# Patient Record
Sex: MALE | Race: BLACK OR AFRICAN AMERICAN | Employment: FULL TIME | URBAN - METROPOLITAN AREA
[De-identification: names, ages, dates, MRNs, and addresses within clinical notes are randomized per-mention and may not be internally consistent; named-entity substitution may affect disease eponyms.]

---

## 2018-07-25 ENCOUNTER — APPOINTMENT (OUTPATIENT)
Dept: ULTRASOUND IMAGING | Facility: CLINIC | Age: 43
End: 2018-07-25
Attending: EMERGENCY MEDICINE
Payer: COMMERCIAL

## 2018-07-25 ENCOUNTER — HOSPITAL ENCOUNTER (EMERGENCY)
Facility: CLINIC | Age: 43
Discharge: HOME | End: 2018-07-25
Attending: EMERGENCY MEDICINE | Admitting: EMERGENCY MEDICINE
Payer: COMMERCIAL

## 2018-07-25 VITALS
TEMPERATURE: 98.1 F | SYSTOLIC BLOOD PRESSURE: 107 MMHG | RESPIRATION RATE: 16 BRPM | DIASTOLIC BLOOD PRESSURE: 88 MMHG | OXYGEN SATURATION: 100 % | HEART RATE: 83 BPM

## 2018-07-25 DIAGNOSIS — E87.6 HYPOKALEMIA: ICD-10-CM

## 2018-07-25 DIAGNOSIS — K85.90 ACUTE PANCREATITIS, UNSPECIFIED COMPLICATION STATUS, UNSPECIFIED PANCREATITIS TYPE: ICD-10-CM

## 2018-07-25 DIAGNOSIS — R11.2 NON-INTRACTABLE VOMITING WITH NAUSEA, UNSPECIFIED VOMITING TYPE: ICD-10-CM

## 2018-07-25 LAB
ALBUMIN SERPL-MCNC: 4.6 G/DL (ref 3.4–5)
ALP SERPL-CCNC: 89 U/L (ref 40–150)
ALT SERPL W P-5'-P-CCNC: 69 U/L (ref 0–70)
ANION GAP SERPL CALCULATED.3IONS-SCNC: 14 MMOL/L (ref 3–14)
ANION GAP SERPL CALCULATED.3IONS-SCNC: 20 MMOL/L (ref 3–14)
AST SERPL W P-5'-P-CCNC: 102 U/L (ref 0–45)
BASOPHILS # BLD AUTO: 0 10E9/L (ref 0–0.2)
BASOPHILS NFR BLD AUTO: 0.1 %
BILIRUB SERPL-MCNC: 2.5 MG/DL (ref 0.2–1.3)
BUN SERPL-MCNC: 11 MG/DL (ref 7–30)
BUN SERPL-MCNC: 13 MG/DL (ref 7–30)
CALCIUM SERPL-MCNC: 8.3 MG/DL (ref 8.5–10.1)
CALCIUM SERPL-MCNC: 9.3 MG/DL (ref 8.5–10.1)
CHLORIDE SERPL-SCNC: 92 MMOL/L (ref 94–109)
CHLORIDE SERPL-SCNC: 98 MMOL/L (ref 94–109)
CO2 SERPL-SCNC: 19 MMOL/L (ref 20–32)
CO2 SERPL-SCNC: 22 MMOL/L (ref 20–32)
CREAT SERPL-MCNC: 0.52 MG/DL (ref 0.66–1.25)
CREAT SERPL-MCNC: 0.7 MG/DL (ref 0.66–1.25)
DIFFERENTIAL METHOD BLD: ABNORMAL
EOSINOPHIL # BLD AUTO: 0 10E9/L (ref 0–0.7)
EOSINOPHIL NFR BLD AUTO: 0 %
ERYTHROCYTE [DISTWIDTH] IN BLOOD BY AUTOMATED COUNT: 13 % (ref 10–15)
GFR SERPL CREATININE-BSD FRML MDRD: >90 ML/MIN/1.7M2
GFR SERPL CREATININE-BSD FRML MDRD: >90 ML/MIN/1.7M2
GLUCOSE SERPL-MCNC: 118 MG/DL (ref 70–99)
GLUCOSE SERPL-MCNC: 143 MG/DL (ref 70–99)
HCT VFR BLD AUTO: 44.8 % (ref 40–53)
HEMOCCULT STL QL: NEGATIVE
HGB BLD-MCNC: 15.9 G/DL (ref 13.3–17.7)
IMM GRANULOCYTES # BLD: 0 10E9/L (ref 0–0.4)
IMM GRANULOCYTES NFR BLD: 0.6 %
INTERPRETATION ECG - MUSE: NORMAL
LACTATE BLD-SCNC: 1.5 MMOL/L (ref 0.7–2)
LIPASE SERPL-CCNC: 977 U/L (ref 73–393)
LYMPHOCYTES # BLD AUTO: 0.8 10E9/L (ref 0.8–5.3)
LYMPHOCYTES NFR BLD AUTO: 10.8 %
MAGNESIUM SERPL-MCNC: 1.9 MG/DL (ref 1.6–2.3)
MCH RBC QN AUTO: 36.3 PG (ref 26.5–33)
MCHC RBC AUTO-ENTMCNC: 35.5 G/DL (ref 31.5–36.5)
MCV RBC AUTO: 102 FL (ref 78–100)
MONOCYTES # BLD AUTO: 0.7 10E9/L (ref 0–1.3)
MONOCYTES NFR BLD AUTO: 9.6 %
NEUTROPHILS # BLD AUTO: 5.7 10E9/L (ref 1.6–8.3)
NEUTROPHILS NFR BLD AUTO: 78.9 %
NRBC # BLD AUTO: 0 10*3/UL
NRBC BLD AUTO-RTO: 0 /100
PLATELET # BLD AUTO: 218 10E9/L (ref 150–450)
POTASSIUM SERPL-SCNC: 2.9 MMOL/L (ref 3.4–5.3)
POTASSIUM SERPL-SCNC: 3.2 MMOL/L (ref 3.4–5.3)
PROT SERPL-MCNC: 9.6 G/DL (ref 6.8–8.8)
RBC # BLD AUTO: 4.38 10E12/L (ref 4.4–5.9)
SODIUM SERPL-SCNC: 131 MMOL/L (ref 133–144)
SODIUM SERPL-SCNC: 134 MMOL/L (ref 133–144)
WBC # BLD AUTO: 7.2 10E9/L (ref 4–11)

## 2018-07-25 PROCEDURE — 25000132 ZZH RX MED GY IP 250 OP 250 PS 637: Performed by: EMERGENCY MEDICINE

## 2018-07-25 PROCEDURE — 83605 ASSAY OF LACTIC ACID: CPT | Performed by: EMERGENCY MEDICINE

## 2018-07-25 PROCEDURE — 96365 THER/PROPH/DIAG IV INF INIT: CPT

## 2018-07-25 PROCEDURE — 82272 OCCULT BLD FECES 1-3 TESTS: CPT | Performed by: EMERGENCY MEDICINE

## 2018-07-25 PROCEDURE — 76705 ECHO EXAM OF ABDOMEN: CPT

## 2018-07-25 PROCEDURE — 25000125 ZZHC RX 250: Performed by: EMERGENCY MEDICINE

## 2018-07-25 PROCEDURE — 99285 EMERGENCY DEPT VISIT HI MDM: CPT | Mod: 25

## 2018-07-25 PROCEDURE — 80048 BASIC METABOLIC PNL TOTAL CA: CPT | Performed by: EMERGENCY MEDICINE

## 2018-07-25 PROCEDURE — 80053 COMPREHEN METABOLIC PANEL: CPT | Performed by: EMERGENCY MEDICINE

## 2018-07-25 PROCEDURE — 25000128 H RX IP 250 OP 636: Performed by: EMERGENCY MEDICINE

## 2018-07-25 PROCEDURE — 83735 ASSAY OF MAGNESIUM: CPT | Performed by: EMERGENCY MEDICINE

## 2018-07-25 PROCEDURE — 83690 ASSAY OF LIPASE: CPT | Performed by: EMERGENCY MEDICINE

## 2018-07-25 PROCEDURE — 96361 HYDRATE IV INFUSION ADD-ON: CPT

## 2018-07-25 PROCEDURE — 85025 COMPLETE CBC W/AUTO DIFF WBC: CPT | Performed by: EMERGENCY MEDICINE

## 2018-07-25 PROCEDURE — 36415 COLL VENOUS BLD VENIPUNCTURE: CPT | Performed by: EMERGENCY MEDICINE

## 2018-07-25 PROCEDURE — 93005 ELECTROCARDIOGRAM TRACING: CPT

## 2018-07-25 PROCEDURE — 96366 THER/PROPH/DIAG IV INF ADDON: CPT

## 2018-07-25 PROCEDURE — 96375 TX/PRO/DX INJ NEW DRUG ADDON: CPT

## 2018-07-25 RX ORDER — ONDANSETRON 4 MG/1
4 TABLET, ORALLY DISINTEGRATING ORAL EVERY 8 HOURS PRN
Qty: 10 TABLET | Refills: 0 | Status: SHIPPED | OUTPATIENT
Start: 2018-07-25 | End: 2019-07-21

## 2018-07-25 RX ORDER — MORPHINE SULFATE 4 MG/ML
4 INJECTION, SOLUTION INTRAMUSCULAR; INTRAVENOUS ONCE
Status: COMPLETED | OUTPATIENT
Start: 2018-07-25 | End: 2018-07-25

## 2018-07-25 RX ORDER — POTASSIUM CL/LIDO/0.9 % NACL 10MEQ/0.1L
10 INTRAVENOUS SOLUTION, PIGGYBACK (ML) INTRAVENOUS ONCE
Status: COMPLETED | OUTPATIENT
Start: 2018-07-25 | End: 2018-07-25

## 2018-07-25 RX ORDER — POTASSIUM CHLORIDE 1500 MG/1
40 TABLET, EXTENDED RELEASE ORAL ONCE
Status: COMPLETED | OUTPATIENT
Start: 2018-07-25 | End: 2018-07-25

## 2018-07-25 RX ORDER — ONDANSETRON 2 MG/ML
4 INJECTION INTRAMUSCULAR; INTRAVENOUS ONCE
Status: COMPLETED | OUTPATIENT
Start: 2018-07-25 | End: 2018-07-25

## 2018-07-25 RX ADMIN — POTASSIUM CHLORIDE 40 MEQ: 1500 TABLET, EXTENDED RELEASE ORAL at 15:36

## 2018-07-25 RX ADMIN — MORPHINE SULFATE 4 MG: 4 INJECTION INTRAVENOUS at 15:36

## 2018-07-25 RX ADMIN — LIDOCAINE HYDROCHLORIDE 10 MEQ: 10 INJECTION, SOLUTION EPIDURAL; INFILTRATION; INTRACAUDAL; PERINEURAL at 16:38

## 2018-07-25 RX ADMIN — ONDANSETRON 4 MG: 2 INJECTION INTRAMUSCULAR; INTRAVENOUS at 15:36

## 2018-07-25 RX ADMIN — SODIUM CHLORIDE 1000 ML: 9 INJECTION, SOLUTION INTRAVENOUS at 15:36

## 2018-07-25 ASSESSMENT — ENCOUNTER SYMPTOMS
DIZZINESS: 0
DYSURIA: 0
BLOOD IN STOOL: 0
FEVER: 0
ABDOMINAL PAIN: 1
NAUSEA: 1
DIARRHEA: 0
VOMITING: 1

## 2018-07-25 NOTE — ED AVS SNAPSHOT
[facility]    [address]    [facility] [address]    Phone:  [phone]    Fax:  [phone]                                       Chalo Freire   MRN: [id number]    Department:  [facility]   Date of Visit:  7/25/2018           After Visit Summary Signature Page     I have received my discharge instructions, and my questions have been answered. I have discussed any challenges I see with this plan with the nurse or doctor.    ..........................................................................................................................................  Patient/Patient Representative Signature      ..........................................................................................................................................  Patient Representative Print Name and Relationship to Patient    ..................................................               ................................................  Date                                            Time    ..........................................................................................................................................  Reviewed by Signature/Title    ...................................................              ..............................................  Date                                                            Time

## 2018-07-25 NOTE — LETTER
July 25, 2018      To Whom It May Concern:      Chalo Freire was seen in our Emergency Department today, 07/25/18.  I expect his condition to improve over the next 2 days.  He may return to work when improved.    Sincerely,        Ciarra Dawkins RN

## 2018-07-25 NOTE — ED PROVIDER NOTES
History     Chief Complaint:  Abdominal Pain, Nausea, and Vomiting    The history is provided by the patient.      Chalo Freire is a 43 year old male who presents with abdominal pain, nausea, and vomiting. Patient reports that he has been experiencing nausea and vomiting over the past 2 days, vomiting multiple times every day (he cannot quantify). Patient has had decreased oral intake secondary to these symptoms, and had a small bowel movement yesterday consisting of black stool. Today patient developed non-radiating epigastric and RUQ abdominal pain which he believes is secondary to the amount of vomiting he has had. Denies africa hemoptysis. He has attempted to take Tylenol for his symptoms but has not been able to keep this down and notes no alleviating factors. He reports history of alcohol abuse (last drink 8 months ago) and gastric ulcers for which he takes Carafate. Denies fever, cough, urinary symptoms, or any other symptoms.    Allergies:  Hydrocodone     Medications:    Valium  Lisinopril  Oxycodone  Carafate     Past Medical History:    Hypertension  Alcoholic fatty liver    Past Surgical History:    No pertinent past surgical history.    Family History:    No pertinent family history.    Social History:  Smoking status: Never smoker  Alcohol use: No (quit in December of 2017)   Marital Status:        Review of Systems   Constitutional: Negative for fever.   Gastrointestinal: Positive for abdominal pain, nausea and vomiting. Negative for blood in stool and diarrhea.   Genitourinary: Negative for dysuria.   Neurological: Negative for dizziness.   All other systems reviewed and are negative.    Physical Exam     Patient Vitals for the past 24 hrs:   BP Temp Temp src Pulse Heart Rate Resp SpO2   07/25/18 1820 (!) 132/102 - - - 82 - 98 %   07/25/18 1815 - - - - 84 - 97 %   07/25/18 1800 - - - - 73 - 97 %   07/25/18 1745 - - - - 74 - 99 %   07/25/18 1630 - - - - 70 - 99 %   07/25/18 1615 - - - - 69 -  99 %   07/25/18 1600 (!) 128/102 - - - 90 - -   07/25/18 1545 - - - - 96 - -   07/25/18 1530 - - - - - - 99 %   07/25/18 1515 - - - - - - 100 %   07/25/18 1510 (!) 140/102 98.1  F (36.7  C) Oral 83 83 16 99 %   07/25/18 1509 - - - - - - 99 %   07/25/18 1150 (!) 142/111 97.7  F (36.5  C) Temporal 112 - 20 99 %       Physical Exam  General: Well-developed and well-nourished. Well appearing middle aged  man. Cooperative.  Head:  Atraumatic.  Eyes:  Conjunctivae, lids, and sclerae are normal.  ENT:    Normal nose. Moist mucous membranes.  Neck:  Supple. Normal range of motion.  CV:  Regular rate and rhythm. Normal heart sounds with no murmurs, rubs, or gallops detected.  Resp:  No respiratory distress. Clear to auscultation bilaterally without decreased breath sounds, wheezing, rales, or rhonchi.  GI:  Soft. Non-distended. Mild-moderate tenderness in the epigastrium with moderate tenderness in the RUQ.     Rectal: No trung-anal masses or fluctuance. Return of scant light brown stool on BOYD.  MS:  Normal ROM.   Skin:  Warm. Non-diaphoretic. No pallor.  Neuro:  Awake. A&Ox3. Normal strength.  Psych: Normal mood and affect. Normal speech.  Vitals reviewed.    Emergency Department Course   EKG  Indication: abdominal pain  Time: 1538  Rate 82 bpm. CO interval 146. QRS duration 82. QT/QTc 380/443.   Normal sinus rhythm minimal voltage criteria for LVH, may be normal variant.  No acute ST changes.  T wave less prominent as compared to prior, dated 11/28/16.    Imaging:  US Abdomen Limited  IMPRESSION:  Gallbladder sludge but no evidence of gallstones or bile duct dilatation. Fatty infiltration of the liver.  Report per radiology.     Laboratory:  CBC:  WBC 7.2, HGB 15.9, ,  CMP:  (L), Potassium 2.9 (L), Chloride 92 (L), Carbon Dioxide 19 (L), ANION Gap 20 (H), Glucose 143 (H), Bilirubin 2.5 (H), Protein 9.6 (H),  (H), otherwise WNL (Creatinine 0.70)  Lipase: 977 (H)  (1254) Magnesium: 1.9  (1552)  Lactic Acid: 1.5    Occult blood stool: negative     Interventions:  (1536) Zofran, 4 mg, IV injection  (1536) Morphine, 4 mg, IV injection  (1536) Normal Saline, 1 liter, IV bolus  (1536) Potassium Chloride SA CR tablet, 20 mEq, PO  (1902) potassium chloride, 10mEq, IV infusion    Emergency Department Course:  Nursing notes and vitals reviewed.  (1510) I performed an exam of the patient as documented above.    Blood was drawn from the patient. This was sent for laboratory testing, findings above.   The patient was sent for a ultrasound while in the emergency department, findings above.   EKG was done, interpretation as above.    (1746) I revisited with the patient in his room to discuss results. Patient is overall feeling improved. Offered admission for observation, but patient stated he would prefer to be discharged. Will repeat BMP and PO challenge.    (1915) I revisited with the patient in his room to discuss results. Patient tolerated crackers at this time and feels comfortable with discharge.    Findings and plan explained to the patient. Patient discharged home with instructions regarding supportive care, medications, and reasons to return. The importance of close follow-up was reviewed. The patient was prescribed Zofran.      Impression & Plan    Medical Decision Making:  Chalo Freire is a 43 year old male who presents with 2 days of epigastric and right upper quadrant abdominal pain as well as nausea and vomiting.  He has a history of ulcers and has used Carafate and also has a history of alcohol abuse with fatty liver though he states he has not been drinking for the last 8 months.  Denies fever or hematochezia though he states his most recent bowel movement was black.  On exam, patient has mild to moderate tenderness in the epigastrium and right upper quadrant without other findings.  Digital rectal exam reveals light brown stool that is guaiac negative.  Patient was given Zofran, morphine, and IV  "fluids during his workup.  Lipase is elevated at 977 revealing a mild degree of pancreatitis.  However, right upper quadrant ultrasound reveals gallbladder sludge without gallstones or biliary duct dilation or other evidence of acute pathology with no evidence of cholecystitis without gallbladder wall thickening or pericholecystic fluid.  Fatty infiltration noted as expected given history of alcoholic fatty liver. Initial BMP reveals mild hyponatremia as well as hypokalemia to 2.9, hypochloremia to 92, and a bicarb of 19 resulting in anion gap of 20.  He has a very minimal elevation in AST to 102 though the remainder of his LFTs are overall reassuring.  It is noted that be total bilirubin is 2.5 though Care Everywhere indicates patient has chronically elevated total bilirubin (2.2 in December 2017) for which he has had MRCP and is followed by GI. Due to his hypokalemia to 2.9, I did obtain an EKG.  There is some T-wave flattening throughout without QT prolongation or other acute findings.  His magnesium is normal.  His potassium was repleted with 40 mEq by mouth as well as 10 mEq IV.  Fortunately his CBC reveals no leukocytosis or thrombocytopenia.  There is no anemia.  After the completion of his aforementioned workup, patient was reevaluated and states he is overall feeling better.  He states his abdominal pain is improved and he has not had vomiting since he has been in the emergency department.  I discussed the results of laboratory and imaging studies with him as well as a diagnosis of mild pancreatitis and offered observation admission.  Patient considered this though he states he preferred to go home if there is \"nothing serious.\"  I then it repeated patient's BMP which reveals normalization of his sodium, chloride, bicarb, and anion gap with a very minimal hypokalemia to 3.2.  This will likely continue to improve given his potassium was in part oral and he was able to tolerate PO in the emergency department " so he can replete further by dietary supplementation.  After he tolerated PO, I completed a final evaluation and he continues to state he is feeling well.  Exact etiology of his epigastric pain, nausea, and vomiting is unclear at this time though I favor it is related to his history of gastritis and alcohol abuse (though he reports he has been abstaining).  I recommended he follow-up with his gastroenterologist to discuss his visit today as well as if he requires endoscopy.  There is a mild degree of pancreatitis so I recommended he eat liquid diet for 1-2 days and then advance to soft, bland diet and eventually his usual diet depending on his symptoms.  He should also eat foods that are rich in potassium.  I provided prescription for Zofran as needed for nausea and vomiting and recommended he use Tylenol - and avoid NSAIDs - as needed for pain.  I provided strict return precautions and answered all the patient's questions.  He verbalizes understanding is amenable to discharge.    Diagnosis:    ICD-10-CM   1. Non-intractable vomiting with nausea, unspecified vomiting type R11.2   2. Acute pancreatitis, unspecified complication status, unspecified pancreatitis type K85.90   3. Hypokalemia E87.6       Disposition:  Patient was discharged home.      Discharge Medications:  New Prescriptions    ONDANSETRON (ZOFRAN ODT) 4 MG ODT TAB    Take 1 tablet (4 mg) by mouth every 8 hours as needed for nausea       I, Jayce Ghotra, am serving as a scribe on 7/25/2018 at 3:10 PM to personally document services performed by Dr. Lemon based on my observations and the provider's statements to me.         Jayce Ghotra  7/25/2018   [facility] EMERGENCY DEPARTMENT       Margie Lemon MD  07/26/18 5582

## 2018-07-25 NOTE — ED AVS SNAPSHOT
[facility]    [address] [facility] [address]    Phone:  [phone]    Fax:  [phone]                                       Chalo Freire   MRN: [id number]    Department:  [facility]   Date of Visit:  7/25/2018           Patient Information     Date Of Birth          1975        Your diagnoses for this visit were:     Non-intractable vomiting with nausea, unspecified vomiting type     Acute pancreatitis, unspecified complication status, unspecified pancreatitis type     Hypokalemia        You were seen by Margie Lemon MD.      Follow-up Information     Follow up with Marciano Salazar In 2 days.        Follow up with [facility].    Specialty:  EMERGENCY MEDICINE    Why:  If symptoms worsen    Contact information:    [address] [facility] [id number] [phone]        Follow up with Rochelle Ayoub NP In 3 days.    Why:  Gastroenterologist     Contact information:    [facility] [address] [place] [id number] [phone]          Discharge Instructions       Use Zofran as needed for nausea and vomiting.  Tylenol as needed for pain. Avoid NSAIDs like ibuprofen or naproxen.  No alcohol.  Eat liquid diet for 1-2 days and then advance to soft, bland diet and eventually your usual diet depending on symptoms. Eat foods rich in potassium.  Follow up with your primary care provider and your GI.  Return with uncontrolled vomiting, uncontrolled pain, fever >101F, or any other concerns.    Discharge References/Attachments     VOMITING (6Y-ADULT) (ENGLISH)    PANCREATITIS (ENGLISH)    HYPOKALEMIA, DISCHARGE INSTRUCTIONS (ENGLISH)      24 Hour Appointment Hotline       To make an appointment at any [facility], call [phone] ([phone]). If you don't have a family doctor or clinic, we will help you find one. [facility] are  conveniently located to serve the needs of you and your family.             Review of your medicines      CONTINUE these medicines which may have CHANGED, or have new prescriptions. If we are uncertain of the size of tablets/capsules you have at home, strength may be listed as something that might have changed.        Dose / Directions Last dose taken    ondansetron 4 MG ODT tab   Commonly known as:  ZOFRAN ODT   Dose:  4 mg   What changed:    - when to take this  - reasons to take this   Quantity:  10 tablet        Take 1 tablet (4 mg) by mouth every 8 hours as needed for nausea   Refills:  0          Our records show that you are taking the medicines listed below. If these are incorrect, please call your family doctor or clinic.        Dose / Directions Last dose taken    diazepam 5 MG tablet   Commonly known as:  VALIUM   Dose:  5-10 mg   Quantity:  20 tablet        Take 1-2 tablets (5-10 mg) by mouth every 8 hours as needed for anxiety or agitation (TREMOR)   Refills:  0        lisinopril 20 MG tablet   Commonly known as:  PRINIVIL/ZESTRIL   Dose:  20 mg   Quantity:  30 tablet        Take 1 tablet (20 mg) by mouth daily   Refills:  0        oxyCODONE IR 5 MG tablet   Commonly known as:  ROXICODONE   Dose:  5 mg   Quantity:  20 tablet        Take 1 tablet (5 mg) by mouth every 6 hours as needed for pain   Refills:  0                Prescriptions were sent or printed at these locations (1 Prescription)                   Other Prescriptions                Printed at Department/Unit printer (1 of 1)         ondansetron (ZOFRAN ODT) 4 MG ODT tab                Procedures and tests performed during your visit     Basic metabolic panel    CBC with platelets differential    Comprehensive metabolic panel    EKG 12-lead, tracing only    Lactic acid whole blood    Lipase    Magnesium    Occult blood stool    US Abdomen Limited      Orders Needing Specimen Collection     None      Pending Results     No orders found from  7/23/2018 to 7/26/2018.            Pending Culture Results     No orders found from 7/23/2018 to 7/26/2018.            Pending Results Instructions     If you had any lab results that were not finalized at the time of your Discharge, you can call the ED Lab Result RN at [phone]. You will be contacted by this team for any positive Lab results or changes in treatment. The nurses are available 7 days a week from 10A to 6:30P.  You can leave a message 24 hours per day and they will return your call.        Test Results From Your Hospital Stay        7/25/2018  1:07 PM      Component Results     Component Value Ref Range & Units Status    WBC 7.2 4.0 - 11.0 10e9/L Final    RBC Count 4.38 (L) 4.4 - 5.9 10e12/L Final    Hemoglobin 15.9 13.3 - 17.7 g/dL Final    Hematocrit 44.8 40.0 - 53.0 % Final     (H) 78 - 100 fl Final    MCH 36.3 (H) 26.5 - 33.0 pg Final    MCHC 35.5 31.5 - 36.5 g/dL Final    RDW 13.0 10.0 - 15.0 % Final    Platelet Count 218 150 - 450 10e9/L Final    Diff Method Automated Method  Final    % Neutrophils 78.9 % Final    % Lymphocytes 10.8 % Final    % Monocytes 9.6 % Final    % Eosinophils 0.0 % Final    % Basophils 0.1 % Final    % Immature Granulocytes 0.6 % Final    Nucleated RBCs 0 0 /100 Final    Absolute Neutrophil 5.7 1.6 - 8.3 10e9/L Final    Absolute Lymphocytes 0.8 0.8 - 5.3 10e9/L Final    Absolute Monocytes 0.7 0.0 - 1.3 10e9/L Final    Absolute Eosinophils 0.0 0.0 - 0.7 10e9/L Final    Absolute Basophils 0.0 0.0 - 0.2 10e9/L Final    Abs Immature Granulocytes 0.0 0 - 0.4 10e9/L Final    Absolute Nucleated RBC 0.0  Final         7/25/2018  1:25 PM      Component Results     Component Value Ref Range & Units Status    Sodium 131 (L) 133 - 144 mmol/L Final    Potassium 2.9 (L) 3.4 - 5.3 mmol/L Final    Chloride 92 (L) 94 - 109 mmol/L Final    Carbon Dioxide 19 (L) 20 - 32 mmol/L Final    Anion Gap 20 (H) 3 - 14 mmol/L Final    Glucose 143 (H) 70 - 99 mg/dL Final    Urea Nitrogen 13  7 - 30 mg/dL Final    Creatinine 0.70 0.66 - 1.25 mg/dL Final    GFR Estimate >90 >60 mL/min/1.7m2 Final    Non  GFR Calc    GFR Estimate If Black >90 >60 mL/min/1.7m2 Final    African American GFR Calc    Calcium 9.3 8.5 - 10.1 mg/dL Final    Bilirubin Total 2.5 (H) 0.2 - 1.3 mg/dL Final    Albumin 4.6 3.4 - 5.0 g/dL Final    Protein Total 9.6 (H) 6.8 - 8.8 g/dL Final    Alkaline Phosphatase 89 40 - 150 U/L Final    ALT 69 0 - 70 U/L Final     (H) 0 - 45 U/L Final         7/25/2018  1:25 PM      Component Results     Component Value Ref Range & Units Status    Lipase 977 (H) 73 - 393 U/L Final         7/25/2018  5:37 PM      Narrative     ULTRASOUND ABDOMEN LIMITED 7/25/2018 5:16 PM     HISTORY:  Right upper quadrant pain.      COMPARISON:  CT abdomen and pelvis 5/23/2016.    FINDINGS:  Liver is increased in echogenicity without focal lesions.  The gallbladder contains sludge but no shadowing gallstones. There is  no gallbladder wall thickening or pericholecystic fluid. Common bile  duct is normal in diameter. Pancreas is normal where visualized.  Examination of the right kidney is unremarkable.        Impression     IMPRESSION:  Gallbladder sludge but no evidence of gallstones or bile  duct dilatation. Fatty infiltration of the liver.    GUERLINE ARMSTRONG MD         7/25/2018  5:04 PM      Component Results     Component Value Ref Range & Units Status    Occult Blood Negative NEG^Negative Final         7/25/2018  4:06 PM      Component Results     Component Value Ref Range & Units Status    Lactic Acid 1.5 0.7 - 2.0 mmol/L Final         7/25/2018  3:36 PM      Component Results     Component Value Ref Range & Units Status    Magnesium 1.9 1.6 - 2.3 mg/dL Final         7/25/2018  6:43 PM      Component Results     Component Value Ref Range & Units Status    Sodium 134 133 - 144 mmol/L Final    Potassium 3.2 (L) 3.4 - 5.3 mmol/L Final    Chloride 98 94 - 109 mmol/L Final    Carbon Dioxide 22 20 - 32  mmol/L Final    Anion Gap 14 3 - 14 mmol/L Final    Glucose 118 (H) 70 - 99 mg/dL Final    Urea Nitrogen 11 7 - 30 mg/dL Final    Creatinine 0.52 (L) 0.66 - 1.25 mg/dL Final    GFR Estimate >90 >60 mL/min/1.7m2 Final    Non  GFR Calc    GFR Estimate If Black >90 >60 mL/min/1.7m2 Final    African American GFR Calc    Calcium 8.3 (L) 8.5 - 10.1 mg/dL Final                Clinical Quality Measure: Blood Pressure Screening     Your blood pressure was checked while you were in the emergency department today. The last reading we obtained was  BP: (!) 132/102 (Simultaneous filing. User may not have seen previous data.) . Please read the guidelines below about what these numbers mean and what you should do about them.  If your systolic blood pressure (the top number) is less than 120 and your diastolic blood pressure (the bottom number) is less than 80, then your blood pressure is normal. There is nothing more that you need to do about it.  If your systolic blood pressure (the top number) is 120-139 or your diastolic blood pressure (the bottom number) is 80-89, your blood pressure may be higher than it should be. You should have your blood pressure rechecked within a year by a primary care provider.  If your systolic blood pressure (the top number) is 140 or greater or your diastolic blood pressure (the bottom number) is 90 or greater, you may have high blood pressure. High blood pressure is treatable, but if left untreated over time it can put you at risk for heart attack, stroke, or kidney failure. You should have your blood pressure rechecked by a primary care provider within the next 4 weeks.  If your provider in the emergency department today gave you specific instructions to follow-up with your doctor or provider even sooner than that, you should follow that instruction and not wait for up to 4 weeks for your follow-up visit.        Thank you for choosing [facility]       Thank you for choosing [facility]  "for your care. Our goal is always to provide you with excellent care. Hearing back from our patients is one way we can continue to improve our services. Please take a few minutes to complete the written survey that you may receive in the mail after you visit with us. Thank you!        [facility] Information     [facility] lets you send messages to your doctor, view your test results, renew your prescriptions, schedule appointments and more. To sign up, go to [email or web address] . Click on \"Log in\" on the left side of the screen, which will take you to the Welcome page. Then click on \"Sign up Now\" on the right side of the page.     You will be asked to enter the access code listed below, as well as some personal information. Please follow the directions to create your username and password.     Your access code is: [email or web address]  Expires: 10/23/2018  7:23 PM     Your access code will  in 90 days. If you need help or a new code, please call your [facility] clinic or [phone].        Care EveryWhere ID     This is your Care EveryWhere ID. This could be used by other organizations to access your [facility] medical records  [id number]        Equal Access to Services     MULU DONOVAN AH: Sada Cooper, sergio mancilla, kemi cade, sakina hitchcock. So [facility] [phone].    ATENCIÓN: Si habla español, tiene a taylor disposición servicios gratuitos de asistencia lingüística. Llame al [phone].    We comply with applicable federal civil rights laws and [place] laws. We do not discriminate on the basis of race, color, national origin, age, disability, sex, sexual orientation, or gender identity.            After Visit Summary       This is your record. Keep this with you and show to your community pharmacist(s) and doctor(s) at your next visit.                  "

## 2018-07-26 NOTE — DISCHARGE INSTRUCTIONS
Use Zofran as needed for nausea and vomiting.  Tylenol as needed for pain. Avoid NSAIDs like ibuprofen or naproxen.  No alcohol.  Eat liquid diet for 1-2 days and then advance to soft, bland diet and eventually your usual diet depending on symptoms. Eat foods rich in potassium.  Follow up with your primary care provider and your GI.  Return with uncontrolled vomiting, uncontrolled pain, fever >101F, or any other concerns.

## 2019-07-21 ENCOUNTER — APPOINTMENT (OUTPATIENT)
Dept: CT IMAGING | Facility: CLINIC | Age: 44
DRG: 433 | End: 2019-07-21
Attending: EMERGENCY MEDICINE
Payer: COMMERCIAL

## 2019-07-21 ENCOUNTER — HOSPITAL ENCOUNTER (INPATIENT)
Facility: CLINIC | Age: 44
LOS: 5 days | Discharge: HOME | DRG: 433 | End: 2019-07-26
Attending: EMERGENCY MEDICINE | Admitting: INTERNAL MEDICINE
Payer: COMMERCIAL

## 2019-07-21 DIAGNOSIS — K21.00 GASTROESOPHAGEAL REFLUX DISEASE WITH ESOPHAGITIS: ICD-10-CM

## 2019-07-21 DIAGNOSIS — F10.939 ALCOHOL WITHDRAWAL SYNDROME WITH COMPLICATION (H): ICD-10-CM

## 2019-07-21 DIAGNOSIS — E83.42 HYPOMAGNESEMIA: ICD-10-CM

## 2019-07-21 DIAGNOSIS — R11.11 VOMITING WITHOUT NAUSEA, INTRACTABILITY OF VOMITING NOT SPECIFIED, UNSPECIFIED VOMITING TYPE: ICD-10-CM

## 2019-07-21 DIAGNOSIS — F10.930 ALCOHOL WITHDRAWAL SYNDROME WITHOUT COMPLICATION (H): ICD-10-CM

## 2019-07-21 DIAGNOSIS — I10 BENIGN ESSENTIAL HYPERTENSION: Primary | ICD-10-CM

## 2019-07-21 DIAGNOSIS — F32.0 CURRENT MILD EPISODE OF MAJOR DEPRESSIVE DISORDER, UNSPECIFIED WHETHER RECURRENT (H): ICD-10-CM

## 2019-07-21 LAB
ALBUMIN SERPL-MCNC: 4 G/DL (ref 3.4–5)
ALBUMIN UR-MCNC: 30 MG/DL
ALP SERPL-CCNC: 129 U/L (ref 40–150)
ALT SERPL W P-5'-P-CCNC: 63 U/L (ref 0–70)
ANION GAP SERPL CALCULATED.3IONS-SCNC: 20 MMOL/L (ref 3–14)
APPEARANCE UR: CLEAR
AST SERPL W P-5'-P-CCNC: 183 U/L (ref 0–45)
BASOPHILS # BLD AUTO: 0 10E9/L (ref 0–0.2)
BASOPHILS NFR BLD AUTO: 0.3 %
BILIRUB DIRECT SERPL-MCNC: 0.7 MG/DL (ref 0–0.2)
BILIRUB SERPL-MCNC: 2.5 MG/DL (ref 0.2–1.3)
BILIRUB UR QL STRIP: NEGATIVE
BUN SERPL-MCNC: 8 MG/DL (ref 7–30)
CALCIUM SERPL-MCNC: 8.8 MG/DL (ref 8.5–10.1)
CHLORIDE SERPL-SCNC: 97 MMOL/L (ref 94–109)
CO2 SERPL-SCNC: 21 MMOL/L (ref 20–32)
COLOR UR AUTO: ABNORMAL
CREAT SERPL-MCNC: 0.75 MG/DL (ref 0.66–1.25)
D DIMER PPP FEU-MCNC: 2.7 UG/ML FEU (ref 0–0.5)
DIFFERENTIAL METHOD BLD: ABNORMAL
EOSINOPHIL # BLD AUTO: 0 10E9/L (ref 0–0.7)
EOSINOPHIL NFR BLD AUTO: 0 %
ERYTHROCYTE [DISTWIDTH] IN BLOOD BY AUTOMATED COUNT: 13 % (ref 10–15)
ETHANOL SERPL-MCNC: <0.01 G/DL
GFR SERPL CREATININE-BSD FRML MDRD: >90 ML/MIN/{1.73_M2}
GLUCOSE BLDC GLUCOMTR-MCNC: 124 MG/DL (ref 70–99)
GLUCOSE SERPL-MCNC: 218 MG/DL (ref 70–99)
GLUCOSE UR STRIP-MCNC: 200 MG/DL
HCT VFR BLD AUTO: 36.7 % (ref 40–53)
HGB BLD-MCNC: 13 G/DL (ref 13.3–17.7)
HGB UR QL STRIP: NEGATIVE
HYALINE CASTS #/AREA URNS LPF: 1 /LPF (ref 0–2)
IMM GRANULOCYTES # BLD: 0 10E9/L (ref 0–0.4)
IMM GRANULOCYTES NFR BLD: 0.3 %
KETONES UR STRIP-MCNC: 60 MG/DL
LACTATE BLD-SCNC: 3.1 MMOL/L (ref 0.7–2)
LACTATE SERPL-SCNC: 3.7 MMOL/L (ref 0.4–2)
LEUKOCYTE ESTERASE UR QL STRIP: NEGATIVE
LIPASE SERPL-CCNC: 255 U/L (ref 73–393)
LYMPHOCYTES # BLD AUTO: 0.6 10E9/L (ref 0.8–5.3)
LYMPHOCYTES NFR BLD AUTO: 5.7 %
MAGNESIUM SERPL-MCNC: 1 MG/DL (ref 1.6–2.3)
MAGNESIUM SERPL-MCNC: 1.3 MG/DL (ref 1.6–2.3)
MCH RBC QN AUTO: 35.7 PG (ref 26.5–33)
MCHC RBC AUTO-ENTMCNC: 35.4 G/DL (ref 31.5–36.5)
MCV RBC AUTO: 101 FL (ref 78–100)
MONOCYTES # BLD AUTO: 0.6 10E9/L (ref 0–1.3)
MONOCYTES NFR BLD AUTO: 6.4 %
MUCOUS THREADS #/AREA URNS LPF: PRESENT /LPF
NEUTROPHILS # BLD AUTO: 8.6 10E9/L (ref 1.6–8.3)
NEUTROPHILS NFR BLD AUTO: 87.3 %
NITRATE UR QL: NEGATIVE
NRBC # BLD AUTO: 0 10*3/UL
NRBC BLD AUTO-RTO: 0 /100
PH UR STRIP: 7 PH (ref 5–7)
PHOSPHATE SERPL-MCNC: 2.1 MG/DL (ref 2.5–4.5)
PLATELET # BLD AUTO: 260 10E9/L (ref 150–450)
POTASSIUM SERPL-SCNC: 3.3 MMOL/L (ref 3.4–5.3)
PROT SERPL-MCNC: 9 G/DL (ref 6.8–8.8)
RBC # BLD AUTO: 3.64 10E12/L (ref 4.4–5.9)
RBC #/AREA URNS AUTO: 1 /HPF (ref 0–2)
SODIUM SERPL-SCNC: 138 MMOL/L (ref 133–144)
SOURCE: ABNORMAL
SP GR UR STRIP: 1 (ref 1–1.03)
TROPONIN I SERPL-MCNC: 0.03 UG/L (ref 0–0.04)
TROPONIN I SERPL-MCNC: <0.015 UG/L (ref 0–0.04)
UROBILINOGEN UR STRIP-MCNC: NORMAL MG/DL (ref 0–2)
WBC # BLD AUTO: 9.9 10E9/L (ref 4–11)
WBC #/AREA URNS AUTO: <1 /HPF (ref 0–5)

## 2019-07-21 PROCEDURE — 81001 URINALYSIS AUTO W/SCOPE: CPT | Performed by: EMERGENCY MEDICINE

## 2019-07-21 PROCEDURE — 84100 ASSAY OF PHOSPHORUS: CPT | Performed by: EMERGENCY MEDICINE

## 2019-07-21 PROCEDURE — 99223 1ST HOSP IP/OBS HIGH 75: CPT | Mod: AI | Performed by: INTERNAL MEDICINE

## 2019-07-21 PROCEDURE — 20000003 ZZH R&B ICU

## 2019-07-21 PROCEDURE — 25000128 H RX IP 250 OP 636: Performed by: EMERGENCY MEDICINE

## 2019-07-21 PROCEDURE — HZ2ZZZZ DETOXIFICATION SERVICES FOR SUBSTANCE ABUSE TREATMENT: ICD-10-PCS | Performed by: INTERNAL MEDICINE

## 2019-07-21 PROCEDURE — 25000128 H RX IP 250 OP 636

## 2019-07-21 PROCEDURE — 25000128 H RX IP 250 OP 636: Performed by: INTERNAL MEDICINE

## 2019-07-21 PROCEDURE — 87641 MR-STAPH DNA AMP PROBE: CPT | Performed by: INTERNAL MEDICINE

## 2019-07-21 PROCEDURE — 83735 ASSAY OF MAGNESIUM: CPT | Performed by: INTERNAL MEDICINE

## 2019-07-21 PROCEDURE — 83690 ASSAY OF LIPASE: CPT | Performed by: EMERGENCY MEDICINE

## 2019-07-21 PROCEDURE — 25000125 ZZHC RX 250: Performed by: INTERNAL MEDICINE

## 2019-07-21 PROCEDURE — 85025 COMPLETE CBC W/AUTO DIFF WBC: CPT | Performed by: EMERGENCY MEDICINE

## 2019-07-21 PROCEDURE — 84484 ASSAY OF TROPONIN QUANT: CPT | Performed by: EMERGENCY MEDICINE

## 2019-07-21 PROCEDURE — 96361 HYDRATE IV INFUSION ADD-ON: CPT

## 2019-07-21 PROCEDURE — 80076 HEPATIC FUNCTION PANEL: CPT | Performed by: EMERGENCY MEDICINE

## 2019-07-21 PROCEDURE — 83735 ASSAY OF MAGNESIUM: CPT | Performed by: EMERGENCY MEDICINE

## 2019-07-21 PROCEDURE — 96365 THER/PROPH/DIAG IV INF INIT: CPT | Mod: 59

## 2019-07-21 PROCEDURE — 93005 ELECTROCARDIOGRAM TRACING: CPT

## 2019-07-21 PROCEDURE — 71260 CT THORAX DX C+: CPT

## 2019-07-21 PROCEDURE — 83605 ASSAY OF LACTIC ACID: CPT | Performed by: INTERNAL MEDICINE

## 2019-07-21 PROCEDURE — 36415 COLL VENOUS BLD VENIPUNCTURE: CPT | Performed by: EMERGENCY MEDICINE

## 2019-07-21 PROCEDURE — 83605 ASSAY OF LACTIC ACID: CPT | Performed by: EMERGENCY MEDICINE

## 2019-07-21 PROCEDURE — 99285 EMERGENCY DEPT VISIT HI MDM: CPT | Mod: 25

## 2019-07-21 PROCEDURE — 84484 ASSAY OF TROPONIN QUANT: CPT | Performed by: INTERNAL MEDICINE

## 2019-07-21 PROCEDURE — 96375 TX/PRO/DX INJ NEW DRUG ADDON: CPT

## 2019-07-21 PROCEDURE — 36415 COLL VENOUS BLD VENIPUNCTURE: CPT | Performed by: INTERNAL MEDICINE

## 2019-07-21 PROCEDURE — 80320 DRUG SCREEN QUANTALCOHOLS: CPT | Performed by: EMERGENCY MEDICINE

## 2019-07-21 PROCEDURE — 87640 STAPH A DNA AMP PROBE: CPT | Performed by: INTERNAL MEDICINE

## 2019-07-21 PROCEDURE — 25800030 ZZH RX IP 258 OP 636: Performed by: EMERGENCY MEDICINE

## 2019-07-21 PROCEDURE — 80048 BASIC METABOLIC PNL TOTAL CA: CPT | Performed by: EMERGENCY MEDICINE

## 2019-07-21 PROCEDURE — 87040 BLOOD CULTURE FOR BACTERIA: CPT | Performed by: EMERGENCY MEDICINE

## 2019-07-21 PROCEDURE — 85379 FIBRIN DEGRADATION QUANT: CPT | Performed by: EMERGENCY MEDICINE

## 2019-07-21 PROCEDURE — 25000125 ZZHC RX 250: Performed by: EMERGENCY MEDICINE

## 2019-07-21 PROCEDURE — 00000146 ZZHCL STATISTIC GLUCOSE BY METER IP

## 2019-07-21 RX ORDER — ACETAMINOPHEN 650 MG/1
650 SUPPOSITORY RECTAL EVERY 4 HOURS PRN
Status: DISCONTINUED | OUTPATIENT
Start: 2019-07-21 | End: 2019-07-26 | Stop reason: HOSPADM

## 2019-07-21 RX ORDER — DIAZEPAM 10 MG/2ML
2.5 INJECTION, SOLUTION INTRAMUSCULAR; INTRAVENOUS ONCE
Status: COMPLETED | OUTPATIENT
Start: 2019-07-21 | End: 2019-07-21

## 2019-07-21 RX ORDER — PROCHLORPERAZINE 25 MG
25 SUPPOSITORY, RECTAL RECTAL EVERY 12 HOURS PRN
Status: DISCONTINUED | OUTPATIENT
Start: 2019-07-21 | End: 2019-07-26 | Stop reason: HOSPADM

## 2019-07-21 RX ORDER — POTASSIUM CHLORIDE 29.8 MG/ML
20 INJECTION INTRAVENOUS
Status: DISCONTINUED | OUTPATIENT
Start: 2019-07-21 | End: 2019-07-21

## 2019-07-21 RX ORDER — NALOXONE HYDROCHLORIDE 0.4 MG/ML
.1-.4 INJECTION, SOLUTION INTRAMUSCULAR; INTRAVENOUS; SUBCUTANEOUS
Status: DISCONTINUED | OUTPATIENT
Start: 2019-07-21 | End: 2019-07-26 | Stop reason: HOSPADM

## 2019-07-21 RX ORDER — HYDROMORPHONE HYDROCHLORIDE 1 MG/ML
0.2 INJECTION, SOLUTION INTRAMUSCULAR; INTRAVENOUS; SUBCUTANEOUS
Status: DISCONTINUED | OUTPATIENT
Start: 2019-07-21 | End: 2019-07-26 | Stop reason: HOSPADM

## 2019-07-21 RX ORDER — PROCHLORPERAZINE MALEATE 5 MG
10 TABLET ORAL EVERY 6 HOURS PRN
Status: DISCONTINUED | OUTPATIENT
Start: 2019-07-21 | End: 2019-07-26 | Stop reason: HOSPADM

## 2019-07-21 RX ORDER — LORAZEPAM 2 MG/ML
1 INJECTION INTRAMUSCULAR EVERY 6 HOURS
Status: DISCONTINUED | OUTPATIENT
Start: 2019-07-21 | End: 2019-07-25

## 2019-07-21 RX ORDER — POTASSIUM CHLORIDE 1.5 G/1.58G
20-40 POWDER, FOR SOLUTION ORAL
Status: DISCONTINUED | OUTPATIENT
Start: 2019-07-21 | End: 2019-07-26 | Stop reason: HOSPADM

## 2019-07-21 RX ORDER — ASPIRIN 81 MG/1
324 TABLET, CHEWABLE ORAL ONCE
Status: DISCONTINUED | OUTPATIENT
Start: 2019-07-21 | End: 2019-07-21

## 2019-07-21 RX ORDER — NITROGLYCERIN 0.4 MG/1
0.4 TABLET SUBLINGUAL EVERY 5 MIN PRN
Status: DISCONTINUED | OUTPATIENT
Start: 2019-07-21 | End: 2019-07-21

## 2019-07-21 RX ORDER — DIPHENHYDRAMINE HYDROCHLORIDE 50 MG/ML
25 INJECTION INTRAMUSCULAR; INTRAVENOUS ONCE
Status: COMPLETED | OUTPATIENT
Start: 2019-07-21 | End: 2019-07-21

## 2019-07-21 RX ORDER — MAGNESIUM SULFATE HEPTAHYDRATE 40 MG/ML
4 INJECTION, SOLUTION INTRAVENOUS EVERY 4 HOURS PRN
Status: DISCONTINUED | OUTPATIENT
Start: 2019-07-21 | End: 2019-07-26 | Stop reason: HOSPADM

## 2019-07-21 RX ORDER — LORAZEPAM 2 MG/ML
1-4 INJECTION INTRAMUSCULAR
Status: DISCONTINUED | OUTPATIENT
Start: 2019-07-21 | End: 2019-07-26 | Stop reason: HOSPADM

## 2019-07-21 RX ORDER — POTASSIUM CL/LIDO/0.9 % NACL 10MEQ/0.1L
10 INTRAVENOUS SOLUTION, PIGGYBACK (ML) INTRAVENOUS
Status: DISCONTINUED | OUTPATIENT
Start: 2019-07-21 | End: 2019-07-26 | Stop reason: HOSPADM

## 2019-07-21 RX ORDER — POTASSIUM CHLORIDE 1500 MG/1
20-40 TABLET, EXTENDED RELEASE ORAL
Status: DISCONTINUED | OUTPATIENT
Start: 2019-07-21 | End: 2019-07-26 | Stop reason: HOSPADM

## 2019-07-21 RX ORDER — POTASSIUM CHLORIDE 1.5 G/1.58G
20 POWDER, FOR SOLUTION ORAL 2 TIMES DAILY
COMMUNITY
End: 2020-03-23

## 2019-07-21 RX ORDER — IOPAMIDOL 755 MG/ML
500 INJECTION, SOLUTION INTRAVASCULAR ONCE
Status: COMPLETED | OUTPATIENT
Start: 2019-07-21 | End: 2019-07-21

## 2019-07-21 RX ORDER — SODIUM CHLORIDE 9 MG/ML
1000 INJECTION, SOLUTION INTRAVENOUS CONTINUOUS
Status: DISCONTINUED | OUTPATIENT
Start: 2019-07-21 | End: 2019-07-21

## 2019-07-21 RX ORDER — HYDRALAZINE HYDROCHLORIDE 20 MG/ML
10 INJECTION INTRAMUSCULAR; INTRAVENOUS EVERY 4 HOURS PRN
Status: DISCONTINUED | OUTPATIENT
Start: 2019-07-21 | End: 2019-07-26 | Stop reason: HOSPADM

## 2019-07-21 RX ORDER — SODIUM CHLORIDE 9 MG/ML
INJECTION, SOLUTION INTRAVENOUS CONTINUOUS
Status: DISCONTINUED | OUTPATIENT
Start: 2019-07-21 | End: 2019-07-24

## 2019-07-21 RX ORDER — POTASSIUM CHLORIDE 7.45 MG/ML
10 INJECTION INTRAVENOUS
Status: DISCONTINUED | OUTPATIENT
Start: 2019-07-21 | End: 2019-07-26 | Stop reason: HOSPADM

## 2019-07-21 RX ORDER — ONDANSETRON 2 MG/ML
INJECTION INTRAMUSCULAR; INTRAVENOUS
Status: COMPLETED
Start: 2019-07-21 | End: 2019-07-21

## 2019-07-21 RX ORDER — METOCLOPRAMIDE HYDROCHLORIDE 5 MG/ML
10 INJECTION INTRAMUSCULAR; INTRAVENOUS ONCE
Status: COMPLETED | OUTPATIENT
Start: 2019-07-21 | End: 2019-07-21

## 2019-07-21 RX ORDER — METOPROLOL SUCCINATE 25 MG/1
25 TABLET, EXTENDED RELEASE ORAL DAILY
Status: ON HOLD | COMMUNITY
End: 2019-07-26

## 2019-07-21 RX ORDER — METOPROLOL TARTRATE 1 MG/ML
5 INJECTION, SOLUTION INTRAVENOUS EVERY 6 HOURS
Status: DISCONTINUED | OUTPATIENT
Start: 2019-07-21 | End: 2019-07-22

## 2019-07-21 RX ADMIN — SODIUM CHLORIDE 1000 ML: 9 INJECTION, SOLUTION INTRAVENOUS at 16:58

## 2019-07-21 RX ADMIN — LORAZEPAM 1 MG: 2 INJECTION INTRAMUSCULAR; INTRAVENOUS at 23:15

## 2019-07-21 RX ADMIN — IOPAMIDOL 66 ML: 755 INJECTION, SOLUTION INTRAVENOUS at 17:59

## 2019-07-21 RX ADMIN — Medication 10 MEQ: at 22:59

## 2019-07-21 RX ADMIN — LORAZEPAM 2 MG: 2 INJECTION INTRAMUSCULAR; INTRAVENOUS at 20:32

## 2019-07-21 RX ADMIN — ONDANSETRON HYDROCHLORIDE 4 MG: 2 INJECTION, SOLUTION INTRAMUSCULAR; INTRAVENOUS at 16:03

## 2019-07-21 RX ADMIN — DIPHENHYDRAMINE HYDROCHLORIDE 25 MG: 50 INJECTION, SOLUTION INTRAMUSCULAR; INTRAVENOUS at 18:40

## 2019-07-21 RX ADMIN — METOCLOPRAMIDE 10 MG: 5 INJECTION, SOLUTION INTRAMUSCULAR; INTRAVENOUS at 18:41

## 2019-07-21 RX ADMIN — FOLIC ACID: 5 INJECTION, SOLUTION INTRAMUSCULAR; INTRAVENOUS; SUBCUTANEOUS at 18:42

## 2019-07-21 RX ADMIN — METOPROLOL TARTRATE 5 MG: 1 INJECTION, SOLUTION INTRAVENOUS at 20:32

## 2019-07-21 RX ADMIN — DEXMEDETOMIDINE 0.2 MCG/KG/HR: 100 INJECTION, SOLUTION, CONCENTRATE INTRAVENOUS at 19:14

## 2019-07-21 RX ADMIN — SODIUM CHLORIDE 87 ML: 9 INJECTION, SOLUTION INTRAVENOUS at 17:59

## 2019-07-21 RX ADMIN — Medication 2.5 MG: at 17:23

## 2019-07-21 RX ADMIN — MAGNESIUM SULFATE HEPTAHYDRATE 4 G: 40 INJECTION, SOLUTION INTRAVENOUS at 20:48

## 2019-07-21 RX ADMIN — SODIUM CHLORIDE 1000 ML: 9 INJECTION, SOLUTION INTRAVENOUS at 16:00

## 2019-07-21 ASSESSMENT — ENCOUNTER SYMPTOMS
PALPITATIONS: 1
FREQUENCY: 1
FEVER: 0
VOMITING: 1
ABDOMINAL PAIN: 1
COUGH: 1
NAUSEA: 1

## 2019-07-21 ASSESSMENT — MIFFLIN-ST. JEOR: SCORE: 1433.75

## 2019-07-21 NOTE — ED TRIAGE NOTES
Pt comes in with nausea, vomiting, dizziness, decreased appetite, head feels off, pounding heart and chills since Friday. No one is ill around pt. Pt dizzy with any activity and blurry vision and red eyes.

## 2019-07-21 NOTE — PHARMACY-ADMISSION MEDICATION HISTORY
Admission medication history interview status for this patient is complete. See [facility] admission navigator for allergy information, prior to admission medications and immunization status.     Medication history interview source(s):Patient & Wife  Medication history resources (including written lists, pill bottles, clinic record): Prescription Bottles  Primary pharmacy:[facility]    Changes made to PTA medication list:  Added: None  Deleted: Diazepam, Zofran, Oxycodone  Changed: None    Actions taken by pharmacist (provider contacted, etc):None     Additional medication history information:None    Medication reconciliation/reorder completed by provider prior to medication history? No    Prior to Admission medications    Medication Sig Last Dose Taking? Auth Provider   lisinopril (PRINIVIL,ZESTRIL) 20 MG tablet Take 1 tablet (20 mg) by mouth daily 7/20/2019 at PM Yes Mckinley Golden MD   metoprolol succinate ER (TOPROL-XL) 25 MG 24 hr tablet Take 25 mg by mouth daily 7/20/2019 at PM Yes Reported, Patient   potassium chloride (KLOR-CON) 20 MEQ packet Take 20 mEq by mouth 2 times daily 7/20/2019 at PM Yes Reported, Patient

## 2019-07-21 NOTE — LETTER
[place] [address] [facility]  [address]  [facility] [address]  Phone: [phone]  Fax: [phone]    July 26, 2019        Chalo Lynchjosiehoward  [address]  [place] [address]          To whom it may concern:    RE: Chalo Franciscomary jobrandon    Patient was seen and treated  at [facility] from 7/21/19 to 7/26/19.    Please contact me for questions or concerns.      Sincerely,        Annalise Mancia MD

## 2019-07-21 NOTE — ED PROVIDER NOTES
"  History     Chief Complaint:  Chest Pain and Nausea & Vomiting    HPI   Chalo Freire is a 44 year old male who presents to the emergency department with chest pain, shakiness, and vomiting. Patient presents with his wife and states the chest discomfort began two days ago and has persisted constantly.  Location is the middle of hist chest, and he describes it as a \"burning.\"  The patient reports palpitations, nausea, vomiting, abdominal pain, a cough with thick phlegm, and decreased urinary frequency. He states he took Mucinex to mitigate his symptoms. He denies a fever, a history of kidney problems, blood in his vomit, or being in close proximity of someone sick recently. Additionally, the patient has been on Metoprolol ER succinate, 25 mg, and Potassium CL, 20 mEq, since 7/8/2019.  Most recent discharge summary as per below.  Wife adds that the patient is shaky and had his last drink yesterday.  Several episodes of non bloody vomiting.  Patient drinks daily and has a history of alcohol abuse.  No seizures.    Physician Discharge Summary      Name: Chalo Freire    MRN: [id number]     YOB: 1975    Age: 41 year old                                                   Primary care provider: Marciano Salazar        Admit date:  5/23/2016        Discharge date and time: 5/27/2016              Discharge Physician:  Mckinley Golden        Discharge Diagnosis         #1.  Alcohol withdrawal syndrome  #2.  Alcohol abuse and dependence  #3.  Hypertension  #4.  Indeterminate liver lesion and fatty liver         Allergies:  Hydrocodone     Medications:    Lisinopril  Metoprolol succinate ER  Valium  Zofran  Oxycodone    Past Medical History:    Low potassium  hypertension   Alcohol withdrawal  Fatty liver    Past Surgical History:    The patient does not have any pertinent past surgical history.    Family History:   No past pertinent family history.    Social History:  Negative for tobacco use.  Positive for " alcohol use.  Marital Status:   [2]     Review of Systems   Constitutional: Negative for fever.   Respiratory: Positive for cough.    Cardiovascular: Positive for chest pain and palpitations.   Gastrointestinal: Positive for abdominal pain, nausea and vomiting.   Genitourinary: Positive for frequency (decreased).   All other systems reviewed and are negative.  Pt comes in with nausea, vomiting, dizziness, decreased appetite, head feels off, pounding heart and chills since Friday. No one is ill around pt. Pt dizzy with any activity and blurry vision and red eyes.    Physical Exam     Patient Vitals for the past 24 hrs:   BP Temp Temp src Pulse Heart Rate Resp SpO2 Weight   07/21/19 1845 (!) 130/103 -- -- 99 88 22 -- --   07/21/19 1824 -- -- -- -- -- -- -- 61.4 kg (135 lb 5.8 oz)   07/21/19 1815 (!) 135/98 -- -- 102 -- -- 99 % --   07/21/19 1745 106/90 -- -- 93 89 21 99 % --   07/21/19 1730 102/85 -- -- 101 97 18 97 % --   07/21/19 1715 (!) 120/94 -- -- 98 87 19 99 % --   07/21/19 1700 (!) 140/103 -- -- 102 105 19 98 % --   07/21/19 1645 (!) 136/104 -- -- 103 98 28 100 % --   07/21/19 1630 (!) 144/102 -- -- 99 111 (!) 39 97 % --   07/21/19 1615 (!) 142/104 -- -- 96 94 25 100 % --   07/21/19 1600 (!) 141/102 -- -- 111 111 (!) 33 100 % --   07/21/19 1550 (!) 146/110 98.6  F (37  C) Oral 119 119 30 99 % --   07/21/19 1545 (!) 146/110 -- -- -- -- -- -- --         Physical Exam  GEN: patient appears tired, wife at the bedside.  Tremulous at times.  HEAD: atraumatic, normocephalic  EYES: pupils reactive (3plus to 2plus), extraocular muscles intact, conjunctivae injected bilaterally  ENT: TMs flat and white bilaterally, oropharynx normal with no erythema or exudate, mucus membranes dry  NECK: no cervical LAD, no meningeal signs  RESPIRATORY: no tachypnea, breath sounds clear to auscultation (no rales, wheezes, rhonchi), chest wall nontender, normal phonation  CVS: normal S1/S2, II/VI systolic ej murmurs/rubs/gallops,  tachycardia  ABDOMEN: soft, nontender, no masses or organomegaly, no rebound, decreased bowel sounds  BACK: no costovertebral angle tenderness  EXTREMITIES: intact pulses x 4, full range of motion at joints, no edema  MUSCULOSKELETAL: no deformities  SKIN: warm and dry, no acute rashes   NEURO: GCS 15, cranial nerves intact.  Motor- moves all 4 extremities with 4/5 strength  Sensation- intact. Coordination- too weak to ambulate.  Overall symmetrical exam.  Patient is shaky.  HEME: no bruising or petechiae/contusions  LYMPH: no lymphadenopathy      Emergency Department Course   ECG:  Indication: Chest pain, nausea, and vomiting  Time: 1547  Vent. Rate 130 bpm. DE interval 142. QRS duration 78. QT/QTc 290/426. P-R-T axis 46 49 18.    Sinus tachycardia  Otherwise normal ECG  No significant change compared to EKG dated 7/25/2018.   Read time: 1548    Imaging:  Radiographic findings were communicated with the patient who voiced understanding of the findings.    CT chest pulmonary embolism w/ IV contrast:   Hepatomegaly with diffuse fatty infiltration of liver. No  evidence of pulmonary embolism. Thoracic aorta is unremarkable, as per radiology.    Laboratory:  Blood culture: In process x2  Lactic acid: 3.7 (H)  Hepatic panel: Bilirubin direct 0.7 (H), Bilirubin total 2.5 (H), protein total 9.0 (H),  (H)    D Dimer quantitative: 2.7 (H)    Magnesium: 1.0 (L)  Alcohol Ethyl: <0.01  Phosphorus: 2.1 (L)  CBC: WBC: 9.9, HGB: 13.0 (H), PLT: 260  (1558) Troponin: <0.015  BMP: Glucose 218 (H), Potassium 3.3. (L), Anion gap 20 (H), o/w WNL (Creatinine: 0.75)  UA with Microscopic: Urine  (A), Urineketon 60 (A) , Protein albumin urine 30 (A), Mucous urine, o/w WNL    Interventions:  1550 Aspirin 324 mg PO  1600 NS 1L IV   1603 Zofran 4 mg injection  1658 NS 1L IV   1723 Valium 2.5 mg IV  1840 Benadryl 25 mg IV  1841 Reglan 10 mg IV  1914 precedex 4 mcg/mL in  mL IV gtt (0.2mg/kg/hr)  1918 NS 1L with Ifuvite  "adult 10 mL, thiamine 100 mg, folic acid 1 mg IV (banana bag)  Heplock  Cardiac/Sp02 monitoring  Oxygen by nasal cannula at 2L/min    Emergency Department Course:  Nursing notes and vitals reviewed. (1611) I performed an exam of the patient as documented above.      Medicine administered as documented above.    IV inserted. Blood drawn. This was sent to the lab for further testing, results above.     The patient was sent for a chest while in the emergency department, findings above.     EKG obtained in the ED, see results above.      (1716) I rechecked the patient and discussed the results of his workup thus far.     (1810) Spoke with wife, recheck patient     (1808) I consulted with Dr. Betancourt of the hospitalist services. They are in agreement to accept the patient for admission.    Findings and plan explained to the Patient who consents to admission. Discussed the patient with Dr. Betancourt, who will admit the patient to an ICU bed for further monitoring, evaluation, and treatment.       I personally reviewed the laboratory and imaging results with the Patient and answered all related questions prior toadmission.        /84   Pulse 79   Temp 99.9  F (37.7  C) (Oral)   Resp 20   Ht 1.676 m (5' 6\")   Wt 60.1 kg (132 lb 7.9 oz)   SpO2 99%   BMI 21.39 kg/m    Discussed results with wiife    Impression & Plan    Medical Decision Making:  Chalo Freire is a 44 year old male who admits that his last drink was yesterday, and he has been feeling increasingly shaky. The patient had to be admitted for alcohol withdrawal seizures (in reviewing his history). When he first presented he was hypertensive and shaking and the ER nurse suspected he was going into withdrawal. An IV was established, and he was given IV fluids in addition to a banana bag with magnesium, thiamine, and foate. The patient was complaining of nausea. He was given Zofran in addition to valium and later another dose of Reglan and benadryl, he " was started on precedex drip for agitation. He was able to urinate here which does show ketones.     His lactic acid is elevated, but I do not suspect that he is septic.  Likely alcoholic ketoacidosis with dehydration. His white blood cell count was normal. No other source of infection. Glucose is a little high at 218. He does have a slight anion gap, but his CO2 is not decreased. Urine did not show much glucose urea. His potassium was a little bit low and  that is in order. Troponin did not show any abnormalities. He was complaining of chest pain and palpitations. His EKG did show sinus tachycardia with inverted T-waves laterally. However troponin did not show any ischemia.  D dimer was elevated, and we could not rule out pulmonary embolism (tachycardia with history of chest pain).      Phosphorus was low, and he was given magnesium repletion lipase. His lipase showed no pancreatitis. CT scan of the chest shows hepatomegaly with diffuse fatty infiltration but no pulmonary embolism. Case was discussed with the hospitalist, and the plan is to be admitted for alcohol withdrawal.    Patient improved with precedex gtt and was less agitated.      Diagnosis:    ICD-10-CM    1. Alcohol withdrawal syndrome without complication (H) F10.230 UA with Microscopic     Alcohol ethyl     Phosphorus     Phosphorus     Lactic acid whole blood     CANCELED: Phosphorus   2. Vomiting without nausea, intractability of vomiting not specified, unspecified vomiting type R11.11    3. Dehydration  4. Lactic acidosis secondary to #3    Disposition:  Admitted to hospital/ICU    Scribe Disclosure:  I, Mahdi Liriano, am serving as a scribe on 7/21/2019 at 4:11 PM to personally document services performed by Lou Pickett MD based on my observations and the provider's statements to me.     7/21/2019   [facility] EMERGENCY DEPARTMENT       Lou Pickett MD  07/21/19 1856

## 2019-07-22 LAB
ALBUMIN SERPL-MCNC: 2.7 G/DL (ref 3.4–5)
ALP SERPL-CCNC: 93 U/L (ref 40–150)
ALT SERPL W P-5'-P-CCNC: 47 U/L (ref 0–70)
ANION GAP SERPL CALCULATED.3IONS-SCNC: 5 MMOL/L (ref 3–14)
AST SERPL W P-5'-P-CCNC: 144 U/L (ref 0–45)
BILIRUB SERPL-MCNC: 1.7 MG/DL (ref 0.2–1.3)
BUN SERPL-MCNC: 5 MG/DL (ref 7–30)
CALCIUM SERPL-MCNC: 7.3 MG/DL (ref 8.5–10.1)
CHLORIDE SERPL-SCNC: 112 MMOL/L (ref 94–109)
CO2 SERPL-SCNC: 26 MMOL/L (ref 20–32)
CREAT SERPL-MCNC: 0.52 MG/DL (ref 0.66–1.25)
ERYTHROCYTE [DISTWIDTH] IN BLOOD BY AUTOMATED COUNT: 13.2 % (ref 10–15)
GFR SERPL CREATININE-BSD FRML MDRD: >90 ML/MIN/{1.73_M2}
GLUCOSE BLDC GLUCOMTR-MCNC: 106 MG/DL (ref 70–99)
GLUCOSE BLDC GLUCOMTR-MCNC: 118 MG/DL (ref 70–99)
GLUCOSE BLDC GLUCOMTR-MCNC: 132 MG/DL (ref 70–99)
GLUCOSE BLDC GLUCOMTR-MCNC: 82 MG/DL (ref 70–99)
GLUCOSE BLDC GLUCOMTR-MCNC: 91 MG/DL (ref 70–99)
GLUCOSE BLDC GLUCOMTR-MCNC: 91 MG/DL (ref 70–99)
GLUCOSE SERPL-MCNC: 93 MG/DL (ref 70–99)
HCT VFR BLD AUTO: 30.7 % (ref 40–53)
HGB BLD-MCNC: 10.3 G/DL (ref 13.3–17.7)
INTERPRETATION ECG - MUSE: NORMAL
LACTATE BLD-SCNC: 0.7 MMOL/L (ref 0.7–2)
MAGNESIUM SERPL-MCNC: 2.6 MG/DL (ref 1.6–2.3)
MCH RBC QN AUTO: 35.6 PG (ref 26.5–33)
MCHC RBC AUTO-ENTMCNC: 33.6 G/DL (ref 31.5–36.5)
MCV RBC AUTO: 106 FL (ref 78–100)
MRSA DNA SPEC QL NAA+PROBE: NEGATIVE
PLATELET # BLD AUTO: 157 10E9/L (ref 150–450)
POTASSIUM SERPL-SCNC: 3.8 MMOL/L (ref 3.4–5.3)
PROT SERPL-MCNC: 6.4 G/DL (ref 6.8–8.8)
RBC # BLD AUTO: 2.89 10E12/L (ref 4.4–5.9)
SODIUM SERPL-SCNC: 143 MMOL/L (ref 133–144)
SPECIMEN SOURCE: NORMAL
TROPONIN I SERPL-MCNC: 0.04 UG/L (ref 0–0.04)
WBC # BLD AUTO: 4.5 10E9/L (ref 4–11)

## 2019-07-22 PROCEDURE — 25800030 ZZH RX IP 258 OP 636: Performed by: INTERNAL MEDICINE

## 2019-07-22 PROCEDURE — 25000132 ZZH RX MED GY IP 250 OP 250 PS 637: Performed by: INTERNAL MEDICINE

## 2019-07-22 PROCEDURE — C9113 INJ PANTOPRAZOLE SODIUM, VIA: HCPCS | Performed by: INTERNAL MEDICINE

## 2019-07-22 PROCEDURE — 12000000 ZZH R&B MED SURG/OB

## 2019-07-22 PROCEDURE — 80053 COMPREHEN METABOLIC PANEL: CPT | Performed by: INTERNAL MEDICINE

## 2019-07-22 PROCEDURE — 99232 SBSQ HOSP IP/OBS MODERATE 35: CPT | Performed by: INTERNAL MEDICINE

## 2019-07-22 PROCEDURE — 83735 ASSAY OF MAGNESIUM: CPT | Performed by: INTERNAL MEDICINE

## 2019-07-22 PROCEDURE — 25000125 ZZHC RX 250: Performed by: INTERNAL MEDICINE

## 2019-07-22 PROCEDURE — 85027 COMPLETE CBC AUTOMATED: CPT | Performed by: INTERNAL MEDICINE

## 2019-07-22 PROCEDURE — 36415 COLL VENOUS BLD VENIPUNCTURE: CPT | Performed by: INTERNAL MEDICINE

## 2019-07-22 PROCEDURE — 25000128 H RX IP 250 OP 636: Performed by: INTERNAL MEDICINE

## 2019-07-22 PROCEDURE — 00000146 ZZHCL STATISTIC GLUCOSE BY METER IP

## 2019-07-22 RX ORDER — METOPROLOL SUCCINATE 25 MG/1
25 TABLET, EXTENDED RELEASE ORAL EVERY EVENING
Status: DISCONTINUED | OUTPATIENT
Start: 2019-07-22 | End: 2019-07-26

## 2019-07-22 RX ORDER — PANTOPRAZOLE SODIUM 40 MG/1
40 TABLET, DELAYED RELEASE ORAL
Status: DISCONTINUED | OUTPATIENT
Start: 2019-07-23 | End: 2019-07-26 | Stop reason: HOSPADM

## 2019-07-22 RX ORDER — LISINOPRIL 20 MG/1
20 TABLET ORAL DAILY
Status: DISCONTINUED | OUTPATIENT
Start: 2019-07-22 | End: 2019-07-26 | Stop reason: HOSPADM

## 2019-07-22 RX ORDER — LISINOPRIL 20 MG/1
20 TABLET ORAL EVERY EVENING
Status: DISCONTINUED | OUTPATIENT
Start: 2019-07-22 | End: 2019-07-22

## 2019-07-22 RX ORDER — POTASSIUM CHLORIDE 1.5 G/1.58G
20 POWDER, FOR SOLUTION ORAL 2 TIMES DAILY
Status: DISCONTINUED | OUTPATIENT
Start: 2019-07-22 | End: 2019-07-26 | Stop reason: HOSPADM

## 2019-07-22 RX ADMIN — FOLIC ACID: 5 INJECTION, SOLUTION INTRAMUSCULAR; INTRAVENOUS; SUBCUTANEOUS at 20:53

## 2019-07-22 RX ADMIN — Medication 10 MEQ: at 00:06

## 2019-07-22 RX ADMIN — LORAZEPAM 1 MG: 2 INJECTION INTRAMUSCULAR; INTRAVENOUS at 02:14

## 2019-07-22 RX ADMIN — PANTOPRAZOLE SODIUM 40 MG: 40 INJECTION, POWDER, FOR SOLUTION INTRAVENOUS at 08:22

## 2019-07-22 RX ADMIN — DEXMEDETOMIDINE 0.2 MCG/KG/HR: 100 INJECTION, SOLUTION, CONCENTRATE INTRAVENOUS at 12:09

## 2019-07-22 RX ADMIN — LORAZEPAM 1 MG: 2 INJECTION INTRAMUSCULAR; INTRAVENOUS at 15:07

## 2019-07-22 RX ADMIN — SODIUM CHLORIDE: 9 INJECTION, SOLUTION INTRAVENOUS at 03:00

## 2019-07-22 RX ADMIN — LORAZEPAM 1 MG: 2 INJECTION INTRAMUSCULAR; INTRAVENOUS at 20:38

## 2019-07-22 RX ADMIN — LORAZEPAM 1 MG: 2 INJECTION INTRAMUSCULAR; INTRAVENOUS at 08:23

## 2019-07-22 RX ADMIN — METOPROLOL TARTRATE 5 MG: 1 INJECTION, SOLUTION INTRAVENOUS at 15:13

## 2019-07-22 RX ADMIN — METOPROLOL TARTRATE 5 MG: 1 INJECTION, SOLUTION INTRAVENOUS at 08:22

## 2019-07-22 RX ADMIN — Medication 10 MEQ: at 01:09

## 2019-07-22 RX ADMIN — METOPROLOL TARTRATE 5 MG: 1 INJECTION, SOLUTION INTRAVENOUS at 02:14

## 2019-07-22 RX ADMIN — POTASSIUM CHLORIDE 20 MEQ: 1.5 POWDER, FOR SOLUTION ORAL at 20:36

## 2019-07-22 RX ADMIN — Medication 10 MEQ: at 02:14

## 2019-07-22 RX ADMIN — METOPROLOL SUCCINATE 25 MG: 25 TABLET, EXTENDED RELEASE ORAL at 20:37

## 2019-07-22 RX ADMIN — LISINOPRIL 20 MG: 20 TABLET ORAL at 17:27

## 2019-07-22 ASSESSMENT — ACTIVITIES OF DAILY LIVING (ADL)
ADLS_ACUITY_SCORE: 13

## 2019-07-22 NOTE — PLAN OF CARE
ICU End of Shift Summary.  For vital signs and complete assessments, please see documentation flowsheets.     Pertinent assessments: Pt alert, oriented, pleasant and cooperative.  VSS.  Low grade temp initially.  C/o abd pain-improved as night progressed.  CIWA scores 11-1, precedex infusing, PRN ativan administered.  Nausea improved during shift. Urine low output but dark leticia in color. Lactic acid improved.  Potassium and magnesium levels are low so were replaced.  Major Shift Events: admission  Plan (Upcoming Events): continue to manage withdrawal symptoms  Discharge/Transfer Needs: TBD    Bedside Shift Report Completed : y  Bedside Safety Check Completed:y

## 2019-07-22 NOTE — H&P
[facility]    History and Physical - Hospitalist Service       Date of Admission:  7/21/2019    Assessment & Plan   Chalo Freire is a 44 year old male admitted on 7/21/2019. He has a past medical history significant for hypertension, hypokalemia, and alcohol dependence with abuse.  He presented to emergency room with nausea and vomiting.  He is in acute alcohol withdrawals.    1.  Acute alcohol withdrawals.  Start Ativan withdrawal protocol.  He was started on Precedex infusion in the emergency room.  Continue Precedex at this time.  Also start scheduled Ativan 1 mg every 6 hours.  Start IV vitamins.    2.  Nausea and vomiting.  Suspect that this is related to his acute alcohol withdrawals.  Also check a lipase level.  Start continuous IV fluids.  N.p.o. for now.  Antiemetics as needed.  Check abdominal x-ray.    3.  Hypertension.  Restart metoprolol and IV form while n.p.o.  5 mg every 6 hours.  Have IV hydralazine available if needed.    4.  Hypokalemia.  Start potassium replacement protocol.    5.  Lactic acidosis.  Suspect that this is due to dehydration and frequent episodes of vomiting.  Start continuous IV fluids.  Recheck lactic acid later tonight.    6.  Epigastric pain.  Check lipase level.  Check serial troponins.  Monitor on telemetry.  Suspect that this is likely related to GI issues.  Hold off on aspirin unless abnormalities noted on serial troponins.    7.  Hyperglycemia.  Denies history of diabetes.  Start NovoLog sliding scale.     Diet: NPO for Medical/Clinical Reasons Except for: Meds    DVT Prophylaxis: Pneumatic Compression Devices  Palacios Catheter: not present  Code Status: Full code        Nico Betancourt, DO  [facility]    ______________________________________________________________________    Chief Complaint   Nausea and vomiting.    History is obtained from the patient    History of Present Illness   Chalo Freire is a 44 year old male who has a past  medical history significant for hypertension, hypokalemia, and alcohol dependence with abuse.  He states he has been having nausea for the past few days.  Has had multiple episodes of vomiting each day for the past 3 days.  Is also been having pain located in his epigastric area that started 3 days ago.  His pain is been constant since that time.  Is located just below his sternum.  Does state that this feels like a pressure sensation.  Pain medications have not helped with this pain.  Nothing seems to make the pain any worse.  He has not noted any fevers or chills.  Has had an occasional cough.  Does feel somewhat short of breath at times because of pain.  Has felt like his heart has been racing at times.  He does note that he stopped drinking alcohol 2 days ago.  He is very evasive and will not tell me exactly how much alcohol he normally drinks.  He does feel weak.  He also feels tremulous.  Has not been able to keep anything down from food or fluid standpoint for the past few days because of frequent vomiting.  No other complaints.    Review of Systems    The 10 point Review of Systems is negative other than noted in the HPI     Past Medical History    I have reviewed this patient's medical history and updated it with pertinent information if needed.   Past Medical History:   Diagnosis Date     Falls      History of low potassium      Hypertension        Past Surgical History   I have reviewed this patient's surgical history and updated it with pertinent information if needed.  No past surgical history on file.    Social History   I have reviewed this patient's social history and updated it with pertinent information if needed.  Social History     Tobacco Use     Smoking status: Never Smoker     Smokeless tobacco: Never Used   Substance Use Topics     Alcohol use: Yes     Drug use: No       Family History   I have reviewed this patient's family history and updated it with pertinent information if needed.        Prior to Admission Medications   Prior to Admission Medications   Prescriptions Last Dose Informant Patient Reported? Taking?   lisinopril (PRINIVIL,ZESTRIL) 20 MG tablet 7/20/2019 at PM  No Yes   Sig: Take 1 tablet (20 mg) by mouth daily   metoprolol succinate ER (TOPROL-XL) 25 MG 24 hr tablet 7/20/2019 at PM  Yes Yes   Sig: Take 25 mg by mouth daily   potassium chloride (KLOR-CON) 20 MEQ packet 7/20/2019 at PM  Yes Yes   Sig: Take 20 mEq by mouth 2 times daily      Facility-Administered Medications: None     Allergies   Allergies   Allergen Reactions     Hydrocodone        Physical Exam   Vital Signs: Temp: 98.6  F (37  C) Temp src: Oral BP: (!) 130/103 Pulse: 99 Heart Rate: 88 Resp: 22 SpO2: 99 %      Weight: 135 lbs 5.8 oz    Gen:  NAD, A&Ox3.  Eyes:  PERRL, sclera anicteric.  OP:  MMM, no lesions.  Neck:  Supple.  CV:  Regular, mild tachycardia, no murmurs.  Lung:  CTA b/l, normal effort.  Ab:  +BS, soft.  Skin:  Warm, dry to touch.  No rash.  Ext:  No pitting edema LE b/l.      Data   Data reviewed today: I reviewed all medications, new labs and imaging results over the last 24 hours. I personally reviewed the EKG tracing showing Sinus tachycardia, no obvious acute ischemia.    Recent Labs   Lab 07/21/19  1558   WBC 9.9   HGB 13.0*   *         POTASSIUM 3.3*   CHLORIDE 97   CO2 21   BUN 8   CR 0.75   ANIONGAP 20*   BELIA 8.8   *   ALBUMIN 4.0   PROTTOTAL 9.0*   BILITOTAL 2.5*   ALKPHOS 129   ALT 63   *   TROPI <0.015

## 2019-07-22 NOTE — PLAN OF CARE
ICU End of Shift Summary.  For vital signs and complete assessments, please see documentation flowsheets.     Pertinent assessments: AOX4, CIWA minimal scores on scheduled Ativan, hypertensive, PTA meds re-started, LS clear on RA, CV SB/SR, GI/ no n/v/d, 1 BM, tolerating reg. Diet, voids ok.  Major Shift Events: Precedex titrated off  Plan (Upcoming Events): Continue CIWA, chem dep outpt, IVF   Discharge/Transfer Needs: transfer to floor, discharge home tomorrow    Bedside Shift Report Completed : Y  Bedside Safety Check Completed: Y

## 2019-07-22 NOTE — PROGRESS NOTES
"[facility]  Hospitalist Progress Note  Juni Griffith MD  07/22/2019    Assessment & Plan   Chalo Freire is a 44 year old male admitted on 7/21/2019. He has a past medical history significant for hypertension, hypokalemia, and alcohol dependence with abuse.  He presented to emergency room with nausea and vomiting.  He is in acute alcohol withdrawals.     1.  Acute alcohol withdrawals.   -- off precedex  -- scoring very mildly on protocol, continue with Ativan withdrawal protocol.   -- continue Ativan 1 mg every 6 hours.   -- continue IV vitamins.  -- resume diet  -- family already has him going to inpatient CD treatment after discharge at \"[facility]\"     2.  Alcohol related hepatitis with nausea and vomiting.     --  lipase level is 255.   -- continue IV fluids.    -- tolerating diet  -- Antiemetics as needed.    -- CT shows  fatty liver, no PE or pneumonia     3.  Hypertension.    -- resume metoprolol, lisinopril.     4.  Hypokalemia.   -- replaced     5.  Lactic acidosis.  Suspect that this is due to dehydration and frequent episodes of vomiting.  -- Recheck of lactic acid back to normal     6.  Epigastric pain.   -- normal lipase level.    --Check serial troponins all below reference range.  --no rhythm problems  7.  Hyperglycemia.  Denies history of diabetes.  Start NovoLog sliding scale.     Diet: tolerating diet  DVT Prophylaxis: Pneumatic Compression Devices  Palacios Catheter: not present  Code Status: Full code    Disposition  -- anticipate home tomorrow  -- plan for inpatient treatment at \"[facility]\"          Interval History   -- chart reviewed  -- off precedex tt  -- tolerating diet  -- family at bedside    -Data reviewed today: I reviewed all new labs and imaging over the last 24 hours. I personally reviewed no images or EKG's today.    Physical Exam   Heart Rate: 51, Blood pressure (!) 122/92, pulse 52, temperature 98.4  F (36.9  C), temperature source Oral, resp. rate 16, " "height 1.676 m (5' 6\"), weight 60.1 kg (132 lb 7.9 oz), SpO2 100 %.  Vitals:    07/21/19 1824 07/21/19 2007   Weight: 61.4 kg (135 lb 5.8 oz) 60.1 kg (132 lb 7.9 oz)     Vital Signs with Ranges  Temp:  [98  F (36.7  C)-99.9  F (37.7  C)] 98.4  F (36.9  C)  Pulse:  [] 52  Heart Rate:  [] 51  Resp:  [9-39] 16  BP: (102-146)/() 122/92  SpO2:  [97 %-100 %] 100 %  I/O's Last 24 hours  I/O last 3 completed shifts:  In: 2715.57 [I.V.:2715.57]  Out: 150 [Urine:150]    Constitutional: Awake, alert, cooperative, no apparent distress  Respiratory: Clear to auscultation bilaterally, no crackles or wheezing  Cardiovascular: Regular rate and rhythm, normal S1 and S2, and no murmur noted  GI: Normal bowel sounds, soft, non-distended, non-tender  Skin/Integumen: No rashes, no cyanosis, no edema  Other:      Medications   All medications were reviewed.    dexmedetomidine Stopped (07/22/19 1434)     sodium chloride 125 mL/hr at 07/22/19 1400       LORazepam  1 mg Intravenous Q6H     metoprolol  5 mg Intravenous Q6H     pantoprazole (PROTONIX) IV  40 mg Intravenous Daily with breakfast     IV infusion builder WITH LARGE additive list   Intravenous Q24H        Data   Recent Labs   Lab 07/22/19  0535 07/21/19  2357 07/21/19  2043 07/21/19  1558   WBC 4.5  --   --  9.9   HGB 10.3*  --   --  13.0*   *  --   --  101*     --   --  260     --   --  138   POTASSIUM 3.8  --   --  3.3*   CHLORIDE 112*  --   --  97   CO2 26  --   --  21   BUN 5*  --   --  8   CR 0.52*  --   --  0.75   ANIONGAP 5  --   --  20*   BELIA 7.3*  --   --  8.8   GLC 93  --   --  218*   ALBUMIN 2.7*  --   --  4.0   PROTTOTAL 6.4*  --   --  9.0*   BILITOTAL 1.7*  --   --  2.5*   ALKPHOS 93  --   --  129   ALT 47  --   --  63   *  --   --  183*   LIPASE  --   --   --  255   TROPI  --  0.037 0.033 <0.015       Recent Results (from the past 24 hour(s))   CT Chest Pulmonary Embolism w Contrast    Narrative    CT CHEST PULMONARY " EMBOLISM WITH CONTRAST 7/21/2019 6:10 PM     HISTORY: Chest pain.     TECHNIQUE: Thin section axial images are performed from the thoracic  inlet to the lung bases utilizing 66 mL of Isovue 370 IV contrast  without adverse event. Coronal reformatted images are also generated.  Radiation dose for this scan was reduced using automated exposure  control, adjustment of the mA and/or kV according to patient size, or  iterative reconstruction technique.    FINDINGS:   Chest: The lungs are clear. No infiltrate or consolidation. No pleural  or pericardial fluid. Heart is normal in size. Esophagus is  unremarkable. Thyroid gland appears normal in size where imaged. No  enlarged lymph nodes in the chest or axillas. No evidence of pulmonary  embolism. Thoracic aorta is unremarkable. Limited images upper abdomen  demonstrate hepatomegaly with diffuse fatty infiltration of liver. The  spleen is normal in size. Bone window examination is within normal  limits.      Impression    IMPRESSION: Hepatomegaly with diffuse fatty infiltration of liver. No  evidence of pulmonary embolism. Thoracic aorta is unremarkable.    MD Juni MORAN MD  Text Page  (7am to 6pm)

## 2019-07-23 LAB
ALBUMIN SERPL-MCNC: 2.8 G/DL (ref 3.4–5)
ALBUMIN UR-MCNC: NEGATIVE MG/DL
ALP SERPL-CCNC: 103 U/L (ref 40–150)
ALT SERPL W P-5'-P-CCNC: 63 U/L (ref 0–70)
ANION GAP SERPL CALCULATED.3IONS-SCNC: 6 MMOL/L (ref 3–14)
APPEARANCE UR: CLEAR
AST SERPL W P-5'-P-CCNC: 174 U/L (ref 0–45)
BILIRUB SERPL-MCNC: 2.4 MG/DL (ref 0.2–1.3)
BILIRUB UR QL STRIP: NEGATIVE
BUN SERPL-MCNC: 2 MG/DL (ref 7–30)
CALCIUM SERPL-MCNC: 7.7 MG/DL (ref 8.5–10.1)
CHLORIDE SERPL-SCNC: 107 MMOL/L (ref 94–109)
CO2 SERPL-SCNC: 25 MMOL/L (ref 20–32)
COLOR UR AUTO: NORMAL
CREAT SERPL-MCNC: 0.5 MG/DL (ref 0.66–1.25)
ERYTHROCYTE [DISTWIDTH] IN BLOOD BY AUTOMATED COUNT: 12.9 % (ref 10–15)
FOLATE SERPL-MCNC: 8.9 NG/ML
GFR SERPL CREATININE-BSD FRML MDRD: >90 ML/MIN/{1.73_M2}
GLUCOSE SERPL-MCNC: 87 MG/DL (ref 70–99)
GLUCOSE UR STRIP-MCNC: NEGATIVE MG/DL
HCT VFR BLD AUTO: 31.7 % (ref 40–53)
HGB BLD-MCNC: 10.8 G/DL (ref 13.3–17.7)
HGB UR QL STRIP: NEGATIVE
KETONES UR STRIP-MCNC: NEGATIVE MG/DL
LEUKOCYTE ESTERASE UR QL STRIP: NEGATIVE
MCH RBC QN AUTO: 35.3 PG (ref 26.5–33)
MCHC RBC AUTO-ENTMCNC: 34.1 G/DL (ref 31.5–36.5)
MCV RBC AUTO: 104 FL (ref 78–100)
NITRATE UR QL: NEGATIVE
PH UR STRIP: 7 PH (ref 5–7)
PLATELET # BLD AUTO: 156 10E9/L (ref 150–450)
POTASSIUM SERPL-SCNC: 3.6 MMOL/L (ref 3.4–5.3)
PROT SERPL-MCNC: 6.7 G/DL (ref 6.8–8.8)
RBC # BLD AUTO: 3.06 10E12/L (ref 4.4–5.9)
RBC #/AREA URNS AUTO: <1 /HPF (ref 0–2)
SODIUM SERPL-SCNC: 138 MMOL/L (ref 133–144)
SOURCE: NORMAL
SP GR UR STRIP: 1 (ref 1–1.03)
UROBILINOGEN UR STRIP-MCNC: NORMAL MG/DL (ref 0–2)
VIT B12 SERPL-MCNC: 796 PG/ML (ref 193–986)
WBC # BLD AUTO: 7.3 10E9/L (ref 4–11)
WBC #/AREA URNS AUTO: <1 /HPF (ref 0–5)

## 2019-07-23 PROCEDURE — 25000132 ZZH RX MED GY IP 250 OP 250 PS 637: Performed by: INTERNAL MEDICINE

## 2019-07-23 PROCEDURE — 81001 URINALYSIS AUTO W/SCOPE: CPT | Performed by: INTERNAL MEDICINE

## 2019-07-23 PROCEDURE — 82746 ASSAY OF FOLIC ACID SERUM: CPT | Performed by: INTERNAL MEDICINE

## 2019-07-23 PROCEDURE — 25000128 H RX IP 250 OP 636: Performed by: INTERNAL MEDICINE

## 2019-07-23 PROCEDURE — 36415 COLL VENOUS BLD VENIPUNCTURE: CPT | Performed by: INTERNAL MEDICINE

## 2019-07-23 PROCEDURE — 99207 ZZC CDG-MDM COMPONENT: MEETS MODERATE - UP CODED: CPT | Performed by: INTERNAL MEDICINE

## 2019-07-23 PROCEDURE — 82607 VITAMIN B-12: CPT | Performed by: INTERNAL MEDICINE

## 2019-07-23 PROCEDURE — 80053 COMPREHEN METABOLIC PANEL: CPT | Performed by: INTERNAL MEDICINE

## 2019-07-23 PROCEDURE — 99232 SBSQ HOSP IP/OBS MODERATE 35: CPT | Performed by: INTERNAL MEDICINE

## 2019-07-23 PROCEDURE — 12000000 ZZH R&B MED SURG/OB

## 2019-07-23 PROCEDURE — 25800030 ZZH RX IP 258 OP 636: Performed by: INTERNAL MEDICINE

## 2019-07-23 PROCEDURE — 25000125 ZZHC RX 250: Performed by: INTERNAL MEDICINE

## 2019-07-23 PROCEDURE — 85027 COMPLETE CBC AUTOMATED: CPT | Performed by: INTERNAL MEDICINE

## 2019-07-23 RX ORDER — TEMAZEPAM 15 MG/1
15 CAPSULE ORAL
Status: DISCONTINUED | OUTPATIENT
Start: 2019-07-23 | End: 2019-07-26 | Stop reason: HOSPADM

## 2019-07-23 RX ADMIN — METOPROLOL SUCCINATE 25 MG: 25 TABLET, EXTENDED RELEASE ORAL at 21:34

## 2019-07-23 RX ADMIN — LORAZEPAM 2 MG: 2 INJECTION INTRAMUSCULAR; INTRAVENOUS at 20:55

## 2019-07-23 RX ADMIN — POTASSIUM CHLORIDE 20 MEQ: 1.5 POWDER, FOR SOLUTION ORAL at 21:34

## 2019-07-23 RX ADMIN — LORAZEPAM 1 MG: 2 INJECTION INTRAMUSCULAR; INTRAVENOUS at 02:32

## 2019-07-23 RX ADMIN — FOLIC ACID: 5 INJECTION, SOLUTION INTRAMUSCULAR; INTRAVENOUS; SUBCUTANEOUS at 21:34

## 2019-07-23 RX ADMIN — POTASSIUM CHLORIDE 20 MEQ: 1.5 POWDER, FOR SOLUTION ORAL at 08:42

## 2019-07-23 RX ADMIN — LISINOPRIL 20 MG: 20 TABLET ORAL at 17:24

## 2019-07-23 RX ADMIN — SODIUM CHLORIDE: 9 INJECTION, SOLUTION INTRAVENOUS at 13:20

## 2019-07-23 RX ADMIN — PANTOPRAZOLE SODIUM 40 MG: 40 TABLET, DELAYED RELEASE ORAL at 07:04

## 2019-07-23 RX ADMIN — SODIUM CHLORIDE: 9 INJECTION, SOLUTION INTRAVENOUS at 02:26

## 2019-07-23 ASSESSMENT — ACTIVITIES OF DAILY LIVING (ADL)
ADLS_ACUITY_SCORE: 13

## 2019-07-23 ASSESSMENT — MIFFLIN-ST. JEOR: SCORE: 1437.85

## 2019-07-23 NOTE — PLAN OF CARE
ICU End of Shift Summary.  For vital signs and complete assessments, please see documentation flowsheets.     Pertinent assessments: Cared for patient from 9795-2711. Patient pleasant, alert and oriented x 4. Afebrile. Spouse by bedside during change of shift. Left and went home for the night since. Scoring 3 on Ciwa scoring d/t hand tremors. No other signs or symptoms observed or reported. Scheduled ativan given as ordered. Denies pain. O2 sats mid to upper 90's on RA. Tele SA 60's- 70's.   Major Shift Events: Transferred to 5th floor medical bed. Banana vitamin bag infusing, IVF placed on hold during this time.   Plan (Upcoming Events): Resume IVF after vitamin bag completed. CIWA scoring. Patient already has set up outpatient Chem dep prior to admission.   Discharge/Transfer Needs:     Bedside Shift Report Completed : Phone report given  Bedside Safety Check Completed:

## 2019-07-23 NOTE — PROGRESS NOTES
"[facility]  Hospitalist Progress Note for 7/23/2019:          Assessment and Plan:   Chalo Freire is a 44 year old male admitted on 7/21/2019. He has a past medical history significant for hypertension, hypokalemia, and alcohol dependence with abuse.  He presented to emergency room with nausea and vomiting.  He is in acute alcohol withdrawals.  Pt initially required precedex on admission & admited to ICU. He was transferred to a medical floor on 7/22/2019.      Acute alcohol withdrawals:   -- off precedex  -- scoring very mildly on protocol, continue with Ativan withdrawal protocol.   -- continue Ativan 1 mg every 6 hours.   -- continue IV vitamins.  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   -- continue IV fluids- decrease rate to 75 ml/hr   -- tolerating diet  -- Antiemetics as needed.    -- CT shows  fatty liver, no PE or pneumonia      Hypertension:    -- resume metoprolol, lisinopril.      Hypokalemia:  -- replaced      Lactic acidosis:  Suspect that this is due to dehydration and frequent episodes of vomiting.  -- Recheck of lactic acid back to normal      Epigastric pain:  -- normal lipase level.    --Check serial troponins all below reference range.  --no rhythm problems , discharge tele     Hyperglycemia:  Denies history of diabetes.  Start NovoLog sliding scale.     Diet: tolerating diet  DVT Prophylaxis: Pneumatic Compression Devices  Palacios Catheter: not present  Code Status: Full code  Disposition: Discussed with pt's wife - pt continues to consume ETOH (even last week). Pt states he has not had ETOH x > 1 month which is disputed by his wife. His wife is v concerned & would like to have him sent for inpt treatment but we are still waiting CD consult /eval here. Plan to keep him here tonight & if CD has not yet seen him by tomorrow, pt " "is stable will plan for discharge & have his family take him for the walkin CD assessment.     Ly Campo MD.  Hospitalist [phone] (7am -6 pm)                 Interval History:   No further N/V. Per RN has tremors. Per pt's wife pt continues to consume ETOH (even last week). Pt states he has not had ETOH x > 1 month which is disputed by his wife. His wife is v concerned & would like to have him sent for inpt treatment, we are still waiting CD consult /eval here.              Medications:       lisinopril  20 mg Oral Daily     LORazepam  1 mg Intravenous Q6H     metoprolol succinate ER  25 mg Oral QPM     pantoprazole  40 mg Oral QAM AC     potassium chloride  20 mEq Oral BID     IV infusion builder WITH LARGE additive list   Intravenous Q24H     acetaminophen, hydrALAZINE, HYDROmorphone, LORazepam, magnesium sulfate, naloxone, potassium chloride, potassium chloride with lidocaine, potassium chloride, potassium chloride, prochlorperazine **OR** prochlorperazine **OR** prochlorperazine               Physical Exam:   Blood pressure (!) 131/97, pulse 63, temperature 99.6  F (37.6  C), temperature source Oral, resp. rate 16, height 1.676 m (5' 6\"), weight 60.5 kg (133 lb 6.4 oz), SpO2 98 %.  Wt Readings from Last 4 Encounters:   19 60.5 kg (133 lb 6.4 oz)   16 70.8 kg (156 lb)   16 63.5 kg (140 lb)         Vital Sign Ranges  Temperature Temp  Av.8  F (37.1  C)  Min: 98  F (36.7  C)  Max: 99.6  F (37.6  C)   Blood pressure Systolic (24hrs), Av , Min:122 , Max:153        Diastolic (24hrs), Av, Min:88, Max:114      Pulse Pulse  Av.4  Min: 52  Max: 96   Respirations Resp  Av.6  Min: 16  Max: 75   Pulse oximetry SpO2  Av.4 %  Min: 98 %  Max: 100 %         Intake/Output Summary (Last 24 hours) at 2019 0802  Last data filed at 2019 0705  Gross per 24 hour   Intake 2213.36 ml   Output 475 ml   Net 1738.36 ml       Constitutional: Anxious, awake, alert, " cooperative, no apparent distress   Lungs: Clear to auscultation bilaterally, no crackles or wheezing   Cardiovascular: Regular rate and rhythm, normal S1 and S2, and no murmur noted   Abdomen: Normal bowel sounds, soft, non-distended, non-tender   Skin: No rashes, no cyanosis, no edema               Data:   All laboratory data reviewed

## 2019-07-23 NOTE — PLAN OF CARE
Patient hospitalized for alcohol withdraw, transfer from ICU, A&Ox4, SBA, Tele SR, VS: elevated diastolic BP, denies pain, abdominal discomfort,  Sat 100% RA, CIWA score 3. Skin intact. Reg diet, +BS, LBM 7/22/19, voids adequately without difficulty. Banana bag infusing, NS on hold. Chem dep consulted. Disposition TBD

## 2019-07-23 NOTE — PLAN OF CARE
Ambulatory Status:  Pt up standby.  Orientation: a/o  VS:  Tmax 99.4  CIWA: 3 and 3 (for baseline tremor), Scheduled IV ativan held, not actively withdrawing.  Pain:  denies  Resp: LS clear.   GI:  denies nausea.  Dagoberto. Regular diet. +BS.  Passing flatus.  Last BM 7/23.  :  voiding without difficulty   Labs:  HGB 10.8, Bili total 2.4,   Consults:  Chem dep and SW   Disposition:  tbd

## 2019-07-23 NOTE — PLAN OF CARE
Ambulatory Status:  Pt up SBA  VS: Tmax 99.5, HR 60's at rest. When patient is up to bathroom HR goes in the 120's per tele tech.  CIWA's 3,3  scheduled IV Ativan 1 mg given  Pain: denies  Resp: LS clear  GI: denies nausea.  Reg diet.  BS active.  Passing flatus.  Last BM 7/23,2 loose BM's this shift  : voiding adequately  Skin: WDL  Tx: NS@125 ml/hr  Labs: K+ 3.8  Consults: Chem Dep  Disposition: TBD

## 2019-07-23 NOTE — CONSULTS
Care Transition Initial Assessment -      Met with: Patient and Family  Called wife on the phone with pt   Active Problems:    Alcohol withdrawal (H)       DATA  Lives With: spouse   Identified issues/concerns regarding health management: PT stated he was told through his [facility]  that he could come to [facility] for CD eval and placement.    @      Other Resources: Chemical Dependency Services     ASSESSMENT  Cognitive Status:  awake, alert and oriented  Concerns to be addressed: Met with pt and discussed options for CD treatment as an out pt program.  Explained that he could go to [facility] as an out pt Mon-Fri   Also provided [facility] CD treatment Resources as well       PLAN  No other needs at this time.

## 2019-07-24 LAB
ALBUMIN SERPL-MCNC: 2.6 G/DL (ref 3.4–5)
ALP SERPL-CCNC: 91 U/L (ref 40–150)
ALT SERPL W P-5'-P-CCNC: 56 U/L (ref 0–70)
ANION GAP SERPL CALCULATED.3IONS-SCNC: 6 MMOL/L (ref 3–14)
AST SERPL W P-5'-P-CCNC: 127 U/L (ref 0–45)
BILIRUB SERPL-MCNC: 1.5 MG/DL (ref 0.2–1.3)
BUN SERPL-MCNC: 3 MG/DL (ref 7–30)
CALCIUM SERPL-MCNC: 7.7 MG/DL (ref 8.5–10.1)
CHLORIDE SERPL-SCNC: 108 MMOL/L (ref 94–109)
CO2 SERPL-SCNC: 25 MMOL/L (ref 20–32)
CREAT SERPL-MCNC: 0.47 MG/DL (ref 0.66–1.25)
ERYTHROCYTE [DISTWIDTH] IN BLOOD BY AUTOMATED COUNT: 12.8 % (ref 10–15)
GFR SERPL CREATININE-BSD FRML MDRD: >90 ML/MIN/{1.73_M2}
GLUCOSE SERPL-MCNC: 84 MG/DL (ref 70–99)
HCT VFR BLD AUTO: 30.3 % (ref 40–53)
HGB BLD-MCNC: 10.6 G/DL (ref 13.3–17.7)
MCH RBC QN AUTO: 36.2 PG (ref 26.5–33)
MCHC RBC AUTO-ENTMCNC: 35 G/DL (ref 31.5–36.5)
MCV RBC AUTO: 103 FL (ref 78–100)
PLATELET # BLD AUTO: 142 10E9/L (ref 150–450)
POTASSIUM SERPL-SCNC: 3.4 MMOL/L (ref 3.4–5.3)
PROT SERPL-MCNC: 6.2 G/DL (ref 6.8–8.8)
RBC # BLD AUTO: 2.93 10E12/L (ref 4.4–5.9)
SODIUM SERPL-SCNC: 139 MMOL/L (ref 133–144)
WBC # BLD AUTO: 5.9 10E9/L (ref 4–11)

## 2019-07-24 PROCEDURE — 25000125 ZZHC RX 250: Performed by: INTERNAL MEDICINE

## 2019-07-24 PROCEDURE — 25000128 H RX IP 250 OP 636: Performed by: INTERNAL MEDICINE

## 2019-07-24 PROCEDURE — 25800030 ZZH RX IP 258 OP 636: Performed by: INTERNAL MEDICINE

## 2019-07-24 PROCEDURE — 12000000 ZZH R&B MED SURG/OB

## 2019-07-24 PROCEDURE — 36415 COLL VENOUS BLD VENIPUNCTURE: CPT | Performed by: INTERNAL MEDICINE

## 2019-07-24 PROCEDURE — 80053 COMPREHEN METABOLIC PANEL: CPT | Performed by: INTERNAL MEDICINE

## 2019-07-24 PROCEDURE — 25000132 ZZH RX MED GY IP 250 OP 250 PS 637: Performed by: INTERNAL MEDICINE

## 2019-07-24 PROCEDURE — 85027 COMPLETE CBC AUTOMATED: CPT | Performed by: INTERNAL MEDICINE

## 2019-07-24 PROCEDURE — 90791 PSYCH DIAGNOSTIC EVALUATION: CPT | Performed by: PSYCHOLOGIST

## 2019-07-24 PROCEDURE — 99232 SBSQ HOSP IP/OBS MODERATE 35: CPT | Performed by: INTERNAL MEDICINE

## 2019-07-24 RX ORDER — TRAZODONE HYDROCHLORIDE 50 MG/1
50 TABLET, FILM COATED ORAL AT BEDTIME
Status: DISCONTINUED | OUTPATIENT
Start: 2019-07-24 | End: 2019-07-26 | Stop reason: HOSPADM

## 2019-07-24 RX ORDER — SODIUM CHLORIDE AND POTASSIUM CHLORIDE 150; 900 MG/100ML; MG/100ML
INJECTION, SOLUTION INTRAVENOUS CONTINUOUS
Status: DISCONTINUED | OUTPATIENT
Start: 2019-07-24 | End: 2019-07-25

## 2019-07-24 RX ADMIN — LORAZEPAM 1 MG: 2 INJECTION INTRAMUSCULAR; INTRAVENOUS at 20:35

## 2019-07-24 RX ADMIN — POTASSIUM CHLORIDE 20 MEQ: 1.5 POWDER, FOR SOLUTION ORAL at 08:48

## 2019-07-24 RX ADMIN — LORAZEPAM 2 MG: 2 INJECTION INTRAMUSCULAR; INTRAVENOUS at 00:57

## 2019-07-24 RX ADMIN — POTASSIUM CHLORIDE AND SODIUM CHLORIDE: 900; 150 INJECTION, SOLUTION INTRAVENOUS at 09:01

## 2019-07-24 RX ADMIN — LORAZEPAM 1 MG: 2 INJECTION INTRAMUSCULAR; INTRAVENOUS at 14:32

## 2019-07-24 RX ADMIN — POTASSIUM CHLORIDE 20 MEQ: 1.5 POWDER, FOR SOLUTION ORAL at 20:40

## 2019-07-24 RX ADMIN — LORAZEPAM 1 MG: 2 INJECTION INTRAMUSCULAR; INTRAVENOUS at 08:48

## 2019-07-24 RX ADMIN — LORAZEPAM 1 MG: 2 INJECTION INTRAMUSCULAR; INTRAVENOUS at 02:49

## 2019-07-24 RX ADMIN — PANTOPRAZOLE SODIUM 40 MG: 40 TABLET, DELAYED RELEASE ORAL at 08:49

## 2019-07-24 RX ADMIN — TRAZODONE HYDROCHLORIDE 50 MG: 50 TABLET ORAL at 22:07

## 2019-07-24 RX ADMIN — FOLIC ACID: 5 INJECTION, SOLUTION INTRAMUSCULAR; INTRAVENOUS; SUBCUTANEOUS at 21:13

## 2019-07-24 RX ADMIN — METOPROLOL SUCCINATE 25 MG: 25 TABLET, EXTENDED RELEASE ORAL at 20:39

## 2019-07-24 RX ADMIN — LISINOPRIL 20 MG: 20 TABLET ORAL at 17:41

## 2019-07-24 RX ADMIN — LORAZEPAM 2 MG: 2 INJECTION INTRAMUSCULAR; INTRAVENOUS at 06:46

## 2019-07-24 RX ADMIN — SODIUM CHLORIDE: 9 INJECTION, SOLUTION INTRAVENOUS at 02:45

## 2019-07-24 ASSESSMENT — ACTIVITIES OF DAILY LIVING (ADL)
ADLS_ACUITY_SCORE: 13
ADLS_ACUITY_SCORE: 10
ADLS_ACUITY_SCORE: 14
ADLS_ACUITY_SCORE: 12
ADLS_ACUITY_SCORE: 13
ADLS_ACUITY_SCORE: 10

## 2019-07-24 ASSESSMENT — MIFFLIN-ST. JEOR: SCORE: 1462.8

## 2019-07-24 NOTE — CONSULTS
This document was created with voice recognition software.  As result, there may be errors in grammar and syntax.  Please consider this when reading this document.    Psychiatric consult, under supervision of Dr. Nieves, ordered by Dr. Campo.  This was an initial consult, which took a total of 55 minutes, with more than half the time coordinating care.    Summary of consult  Please see the H&P by Dr. Betancourt for detailed information about why El Freire was admitted.  For the purposes of this report, it should be noted that he is a 44-year-old male, of  origin, with PMH significant for hypertension, hypokalemia, and alcohol dependence, with multiple withdrawal episodes..  He stopped drinking about 2 days prior to admission.  He presented to the [facility] ED due to nausea and vomiting, and was assessed as being an acute alcohol withdrawal.    He was admitted as a voluntary care patient for treatment for alcohol withdrawal.  No cognitive or psychiatric/behavioral concerns were documented.    Last night he became agitated, was documented to have pulled out an IV line, and required monitoring by an in-room .  The attendant has been discontinued, and he has been documented to be calm and cooperative earlier today.    I was asked to see this patient to help with the hospitalist team with psychiatric/behavioral differential diagnosis, assess encephalopathy and risk factors, facilitate a medication consultation with Dr. Nieves, and to provide input into discharge planning.     Consultation with other team members  I conferred remotely with Dr. Nieves.  She assessed his recent behavior as likely related to withdrawal.  She recommended:   1.  Offering trazodone, 50 mg at bedtime, for help with sleep.  2.  If he develops cravings or anxiety, he could be offered gabapentin, 100 mg 3 times daily, as needed  3.  She supported the plan for a prompt CD assessment and prompt admission to CD  "treatment.    -----------------------------------------------------------------------------------------    Records reviewed   H&P done by Dr. Betancourt, including review of systems,  as well as chart review including  previous admissions, Care Everywhere records and  the pharmacy admission note.     Progress notes/consults  1.  Dr. Campo documented on 7-23-19 that the patient denied using alcohol in the last month, but his wife reports that he has continued to abuse alcohol.  His wife is highly concerned about him and would like to have him discharged to inpatient CD treatment.  If a timely CD assessment is not possible here, the plan is to discharge him home and to provide guidance to his wife about accessing treatment in the community.  2.  Social work consult, 7-23-19, provided information about accessing CD eval and placement.    Previous treatment records  1.  ED visit, 6-3-19, [facility], patient presented to the ED with multiple complaints.  He was assessed as \"a fairly poor historian.\"  He has history of alcohol withdrawal delirium and alcoholic pancreatitis.  He had stopped drinking 2 days prior.  He was referred to detox and declined.  He was assessed as not being in active withdrawal.  2.  Discharge summary, from treatment here at [facility], 5-27-16.  He was admitted for alcohol withdrawal syndrome.  No cognitive or psychiatric/behavioral concerns were documented.    Patient Report of Admission Events/Circumstances  El bluntly acknowledged that he understands that he needs to stop drinking, that \"something bad will happen\" if he continues to drink. I briefly discussed the likelihood that, if he continues to drink, his quality of life will continue to deteriorate and he is at risk for premature death.  He tolerated this discussion, and indicated that he understands.    In regard to recent behavioral problems, he explained that he has had \"a lot of fluids\" and experience urinary " urgency.  This is left him uncomfortable, restless, and at one point he needed to go to the bathroom urgently and said that he forgot that he had an IV line and as a result inadvertently pulled it out.  He denied intentionally pulling it out.    Mental and Chemical Health Treatment History    El has no history of psychiatric/behavioral/MERRY services.  He is in agreement with the plan for prompt CD treatment.  He reports that he drinks to cope with stress, and I briefly discussed the necessity of both establishing sobriety and developing new coping resources, which is typically part of CD treatment.    Social Background  El originally is from [place].  He has been in the [place] for 20 years.  He speaks English fluently.  He is , has no children, and resides in an apartment with his wife.  He works for the [employer], as a .    Behavioral Assessment  El was resting in bed when I introduced myself, and greeted me in a quiet but friendly manner. His appearance was notable for dark complexion and hair, typical for his ethnic background, short and thin stature.  He was oriented to person, circumstances, place and time. Eye contact was normal.  El's speech was soft, not entirely fluent as he had some transient problems with word finding, typical for somebody in withdrawal, but overall was logical and goal-directed.  It was not pressured, and he made no unusual statements. Attention and concentration were intact.      In addition to relatively minor problems with word finding, it was evident that he is currently having problems with short-term memory.  At the start of the appointment, I briefly discussed the plan for him to have a CD evaluation here, if possible, and otherwise to have it at the treatment facility.  At the end of the interview, he asked me a question that the clearly indicated that he did not recall this information, I restated it, and he showed  "no awareness that I previously provided this information.    Associations are intact. IQ and fund of knowledge are cautiously estimated to be in the Average range.     No unusual movements were observed. Muscle strength/tone, gait and station are deferred to the hospitalist team    El described his current mood as \" okay.\"  Affect was calm.  He does not endorse current symptoms of depression or anxiety, but does report that he experiences what sounds like situational stresses.  He has no history of bipolar behavioral patterns or psychotic symptoms no obvious behavioral indications of psychotic symptoms.      Insight/judgement are likely at baseline, currently reasonable, have improved since admission.     Risk Assessment  El denies history of suicidal behavior and denies current suicidal ideation.    Impressions  Strengths: El seemed collaborative and seemed open in response to my questions.  It appears that encephalopathy symptoms have mostly, but not entirely, resolved.  The remaining symptoms are transient and are typical of a patient in withdrawal.  Today, he verbalizes understanding of the to establish sobriety and the likelihood of deteriorating quality of life and premature death if he continues to drink per  Liabilities: El chronically abuses alcoho in spite of significant medical consequences.      Diagnoses   Alcohol dependency, severe, not in remission; alcohol withdrawal, per hospitalist team, with associated cognitive impairments including problems currently with word finding and memory; insomnia, rule out secondary to chronic alcohol abuse; likely situational stress/adjustment disorder, unspecified.    Recommendations   1.  Dr. Campo was notified of Dr. Nieves's recommendations.  2.  When medically cleared, discharge to home and prompt admission to CD treatment.  If he is CD assessment is not possibly for his discharge, he and his wife have been provided information about how to " access treatment.    Rufino Roy Psy.D., L.P.  [phone] (cell)  [phone] (office)

## 2019-07-24 NOTE — PROGRESS NOTES
"[facility]  Hospitalist Progress Note for 7/24/2019:          Assessment and Plan:   Chalo Freire is a 44 year old male admitted on 7/21/2019. He has a past medical history significant for hypertension, hypokalemia, and alcohol dependence with abuse.  He presented to emergency room with nausea and vomiting.  He is in acute alcohol withdrawals.  Pt initially required precedex on admission & admited to ICU. He was transferred to a medical floor on 7/22/2019.      Acute alcohol withdrawals:   -initially required precedex  - initially scored very minimal on protocol, so hardly required Ativan per withdrawal protocol.   - last night had increased agitation requiring a sitter   -- continue Ativan 1 mg every 6 hours , sitter   -- continue IVF, IV vitamins.  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   -- continue IV fluids- decrease rate to 75 ml/hr   -- tolerating diet  -- Antiemetics as needed.    -- CT shows  fatty liver, no PE or pneumonia      Hypertension:    -- resume metoprolol, lisinopril.      Hypokalemia:  -- replaced      Lactic acidosis:  Suspect that this is due to dehydration and frequent episodes of vomiting.  -- Recheck of lactic acid back to normal      Epigastric pain:  -- normal lipase level.    --Check serial troponins all below reference range.  --no rhythm problems , discharge tele     Hyperglycemia:  Denies history of diabetes.  Start NovoLog sliding scale.     Diet: tolerating diet  DVT Prophylaxis: Pneumatic Compression Devices  Palacios Catheter: not present  Code Status: Full code  Disposition: waiting for CD consult. family already has him going to inpatient CD treatment after discharge at \"[facility] once his initial evaluation by CD completed here " "preferably.    Ly Campo MD.  Hospitalist [phone] (7am -6 pm)                 Interval History:   No further N/V. Per RN has tremors. Per pt's wife pt continues to consume ETOH (even last week). Pt states he has not had ETOH x > 1 month which is disputed by his wife. His wife is v concerned & would like to have him sent for inpt treatment, we are still waiting CD consult /eval here.              Medications:       lisinopril  20 mg Oral Daily     LORazepam  1 mg Intravenous Q6H     metoprolol succinate ER  25 mg Oral QPM     pantoprazole  40 mg Oral QAM AC     potassium chloride  20 mEq Oral BID     IV infusion builder WITH LARGE additive list   Intravenous Q24H     acetaminophen, hydrALAZINE, HYDROmorphone, LORazepam, magnesium sulfate, naloxone, potassium chloride, potassium chloride with lidocaine, potassium chloride, potassium chloride, prochlorperazine **OR** prochlorperazine **OR** prochlorperazine, temazepam               Physical Exam:   Blood pressure 114/84, pulse 63, temperature 99.2  F (37.3  C), temperature source Oral, resp. rate 16, height 1.676 m (5' 6\"), weight 63 kg (138 lb 14.4 oz), SpO2 99 %.  Wt Readings from Last 4 Encounters:   19 63 kg (138 lb 14.4 oz)   16 70.8 kg (156 lb)   16 63.5 kg (140 lb)         Vital Sign Ranges  Temperature Temp  Av.8  F (37.1  C)  Min: 98  F (36.7  C)  Max: 99.6  F (37.6  C)   Blood pressure Systolic (24hrs), Av , Min:122 , Max:153        Diastolic (24hrs), Av, Min:88, Max:114      Pulse Pulse  Av.4  Min: 52  Max: 96   Respirations Resp  Av.6  Min: 16  Max: 75   Pulse oximetry SpO2  Av.4 %  Min: 98 %  Max: 100 %         Intake/Output Summary (Last 24 hours) at 2019 0802  Last data filed at 2019 0705  Gross per 24 hour   Intake 2213.36 ml   Output 475 ml   Net 1738.36 ml       Constitutional: Sleeping, no apparent distress. Sitter in his room.   Lungs: Clear to auscultation bilaterally, no " crackles or wheezing   Cardiovascular: Regular rate and rhythm, normal S1 and S2, and no murmur noted   Abdomen: Normal bowel sounds, soft, non-distended, non-tender   Skin: No rashes, no cyanosis, no edema               Data:   All laboratory data reviewed

## 2019-07-24 NOTE — PROGRESS NOTES
Discharge Planner   Discharge Plans in progress: Spoke with pt and wife again today about CD assessment. [facility] CD aware of open consult, due to staffing and clinic appts not likely they will be able to see pt prior to d.c   Barriers to discharge plan: PT more confused today.   Follow up plan: [facility] has provided CD Resources to pt.. [facility] explained again today to pt and wife that Pt will most likely d.c home and not transfer directly to CD program from [facility]        Entered by: Corinne C. White 07/24/2019 4:14 PM

## 2019-07-24 NOTE — PLAN OF CARE
Ambulatory Status:  Pt up 1 assist with belt and walker. Alarms and VPM on for safety.   Orientation: dis to situation.   VS:  Tmax 99.2  CIWA: 6 and 5, scheduled ativan given.   Pain:  denies  Resp: LS clear.   GI:  denies nausea.  Dagoberto. regular diet. +BS.  Passing flatus.  Last BM 7/24.  :  voiding without difficulty   Labs:  HGB 10.6  Consults:  chem dep and psych  Disposition:  tbd

## 2019-07-24 NOTE — PLAN OF CARE
Patient alert and oriented. VSS. Denies pain. A1 with walker for mobility. CIWA 3, 2. Wife present. Alarms and VPM. Upright in chair, pleasant.

## 2019-07-24 NOTE — PLAN OF CARE
A/disorientated to situation  Ambulatory Status:  Pt up SBA, impulsive, getting out of bed, setting off VPM several times this shift, pulled out PIV, new IV placed, sitter now at bedside this morning  VSS  CIWA's: 5,11,5,8,15,10  Total of IV Ativan 7 mg this shift  Pain: denies  Resp: LS clear  GI: denies nausea.  Reg diet.  BS active.  Passing flatus.  Last BM 7/24,1 loose BM this shift  :voiding frequently, PVR: 115 ml  Skin: WDL  Tx: NS@75ml/hr  Labs: K+ 3.6  Consults: Chem Dep  Disposition: TBD

## 2019-07-24 NOTE — PLAN OF CARE
"Pt up SBA. Alert & oriented x4. BP max 134/97. CIWA 2,8,4. Ativan given x1. Pt reported feeling \"bugs crawling on arms\". Pt also exhibited tremors & some agitation. Chem dep consulted.   "

## 2019-07-25 LAB
ALBUMIN SERPL-MCNC: 2.6 G/DL (ref 3.4–5)
ALP SERPL-CCNC: 105 U/L (ref 40–150)
ALT SERPL W P-5'-P-CCNC: 54 U/L (ref 0–70)
ANION GAP SERPL CALCULATED.3IONS-SCNC: 4 MMOL/L (ref 3–14)
AST SERPL W P-5'-P-CCNC: 98 U/L (ref 0–45)
BILIRUB SERPL-MCNC: 1 MG/DL (ref 0.2–1.3)
BUN SERPL-MCNC: 7 MG/DL (ref 7–30)
CALCIUM SERPL-MCNC: 8.4 MG/DL (ref 8.5–10.1)
CHLORIDE SERPL-SCNC: 112 MMOL/L (ref 94–109)
CO2 SERPL-SCNC: 26 MMOL/L (ref 20–32)
CREAT SERPL-MCNC: 0.56 MG/DL (ref 0.66–1.25)
ERYTHROCYTE [DISTWIDTH] IN BLOOD BY AUTOMATED COUNT: 13 % (ref 10–15)
GFR SERPL CREATININE-BSD FRML MDRD: >90 ML/MIN/{1.73_M2}
GLUCOSE SERPL-MCNC: 86 MG/DL (ref 70–99)
HCT VFR BLD AUTO: 31 % (ref 40–53)
HGB BLD-MCNC: 10.6 G/DL (ref 13.3–17.7)
MAGNESIUM SERPL-MCNC: 1.6 MG/DL (ref 1.6–2.3)
MCH RBC QN AUTO: 35.8 PG (ref 26.5–33)
MCHC RBC AUTO-ENTMCNC: 34.2 G/DL (ref 31.5–36.5)
MCV RBC AUTO: 105 FL (ref 78–100)
PLATELET # BLD AUTO: 156 10E9/L (ref 150–450)
POTASSIUM SERPL-SCNC: 4 MMOL/L (ref 3.4–5.3)
PROT SERPL-MCNC: 6.4 G/DL (ref 6.8–8.8)
RBC # BLD AUTO: 2.96 10E12/L (ref 4.4–5.9)
SODIUM SERPL-SCNC: 142 MMOL/L (ref 133–144)
WBC # BLD AUTO: 5.2 10E9/L (ref 4–11)

## 2019-07-25 PROCEDURE — 83735 ASSAY OF MAGNESIUM: CPT | Performed by: INTERNAL MEDICINE

## 2019-07-25 PROCEDURE — 12000000 ZZH R&B MED SURG/OB

## 2019-07-25 PROCEDURE — 99232 SBSQ HOSP IP/OBS MODERATE 35: CPT | Performed by: INTERNAL MEDICINE

## 2019-07-25 PROCEDURE — 25000128 H RX IP 250 OP 636: Performed by: INTERNAL MEDICINE

## 2019-07-25 PROCEDURE — 36415 COLL VENOUS BLD VENIPUNCTURE: CPT | Performed by: INTERNAL MEDICINE

## 2019-07-25 PROCEDURE — 25000132 ZZH RX MED GY IP 250 OP 250 PS 637: Performed by: INTERNAL MEDICINE

## 2019-07-25 PROCEDURE — 85027 COMPLETE CBC AUTOMATED: CPT | Performed by: INTERNAL MEDICINE

## 2019-07-25 PROCEDURE — 80053 COMPREHEN METABOLIC PANEL: CPT | Performed by: INTERNAL MEDICINE

## 2019-07-25 RX ORDER — LANOLIN ALCOHOL/MO/W.PET/CERES
100 CREAM (GRAM) TOPICAL DAILY
Status: DISCONTINUED | OUTPATIENT
Start: 2019-07-26 | End: 2019-07-26 | Stop reason: HOSPADM

## 2019-07-25 RX ORDER — MULTIVITAMIN,THERAPEUTIC
1 TABLET ORAL DAILY
Status: DISCONTINUED | OUTPATIENT
Start: 2019-07-26 | End: 2019-07-26 | Stop reason: HOSPADM

## 2019-07-25 RX ORDER — FOLIC ACID 1 MG/1
1 TABLET ORAL ONCE
Status: DISCONTINUED | OUTPATIENT
Start: 2019-07-25 | End: 2019-07-25

## 2019-07-25 RX ORDER — MULTIVITAMIN,THERAPEUTIC
1 TABLET ORAL ONCE
Status: DISCONTINUED | OUTPATIENT
Start: 2019-07-25 | End: 2019-07-25

## 2019-07-25 RX ORDER — FOLIC ACID 1 MG/1
1 TABLET ORAL DAILY
Status: DISCONTINUED | OUTPATIENT
Start: 2019-07-26 | End: 2019-07-26 | Stop reason: HOSPADM

## 2019-07-25 RX ORDER — MAGNESIUM OXIDE 400 MG/1
800 TABLET ORAL ONCE
Status: DISCONTINUED | OUTPATIENT
Start: 2019-07-25 | End: 2019-07-25

## 2019-07-25 RX ORDER — ACETAMINOPHEN 325 MG/1
650 TABLET ORAL EVERY 8 HOURS PRN
Status: DISCONTINUED | OUTPATIENT
Start: 2019-07-25 | End: 2019-07-26 | Stop reason: HOSPADM

## 2019-07-25 RX ORDER — LORAZEPAM 2 MG/ML
1 INJECTION INTRAMUSCULAR EVERY 8 HOURS PRN
Status: DISCONTINUED | OUTPATIENT
Start: 2019-07-25 | End: 2019-07-26 | Stop reason: HOSPADM

## 2019-07-25 RX ORDER — LANOLIN ALCOHOL/MO/W.PET/CERES
100 CREAM (GRAM) TOPICAL ONCE
Status: DISCONTINUED | OUTPATIENT
Start: 2019-07-25 | End: 2019-07-25

## 2019-07-25 RX ADMIN — POTASSIUM CHLORIDE 20 MEQ: 1.5 POWDER, FOR SOLUTION ORAL at 21:36

## 2019-07-25 RX ADMIN — PANTOPRAZOLE SODIUM 40 MG: 40 TABLET, DELAYED RELEASE ORAL at 06:36

## 2019-07-25 RX ADMIN — TRAZODONE HYDROCHLORIDE 50 MG: 50 TABLET ORAL at 21:36

## 2019-07-25 RX ADMIN — POTASSIUM CHLORIDE AND SODIUM CHLORIDE: 900; 150 INJECTION, SOLUTION INTRAVENOUS at 02:47

## 2019-07-25 RX ADMIN — ACETAMINOPHEN 650 MG: 325 TABLET, FILM COATED ORAL at 09:10

## 2019-07-25 RX ADMIN — POTASSIUM CHLORIDE 20 MEQ: 1.5 POWDER, FOR SOLUTION ORAL at 09:10

## 2019-07-25 RX ADMIN — LORAZEPAM 1 MG: 2 INJECTION INTRAMUSCULAR; INTRAVENOUS at 02:23

## 2019-07-25 RX ADMIN — LISINOPRIL 20 MG: 20 TABLET ORAL at 17:09

## 2019-07-25 RX ADMIN — METOPROLOL SUCCINATE 25 MG: 25 TABLET, EXTENDED RELEASE ORAL at 21:36

## 2019-07-25 ASSESSMENT — ACTIVITIES OF DAILY LIVING (ADL)
ADLS_ACUITY_SCORE: 13
ADLS_ACUITY_SCORE: 12
ADLS_ACUITY_SCORE: 13

## 2019-07-25 ASSESSMENT — MIFFLIN-ST. JEOR: SCORE: 1470.05

## 2019-07-25 NOTE — PROGRESS NOTES
"[facility]  Hospitalist Progress Note for 07/25/2019          Assessment and Plan:   Chalo Freire is a 44 year old male admitted on 7/21/2019. He has a past medical history significant for hypertension, hypokalemia, and alcohol dependence with abuse.  He presented to emergency room with nausea and vomiting.  He is in acute alcohol withdrawals.  Pt initially required precedex on admission & admited to ICU. He was transferred to a medical floor on 7/22/2019.      Acute alcohol withdrawals:  Resolved no evidence of acute withdrawal at this time  -initially required precedex  - initially scored very minimal on protocol, so hardly required Ativan per withdrawal protocol.   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   -- continue IV fluids- decrease rate to 75 ml/hr   -- tolerating diet  -- Antiemetics as needed.    -- CT shows  fatty liver, no PE or pneumonia      Hypertension:    -- resumed metoprolol, lisinopril.      Hypokalemia:  -- replaced      Lactic acidosis:  Suspect that this is due to dehydration and frequent episodes of vomiting.  -- Recheck of lactic acid back to normal      Epigastric pain:  -- normal lipase level.    --Check serial troponins all below reference range.  --no rhythm problems , discharge tele     Hyperglycemia:  Denies history of diabetes.  Start NovoLog sliding scale.     Diet: tolerating diet  DVT Prophylaxis: Pneumatic Compression Devices  Palacios Catheter: not present  Code Status: Full code  Disposition: waiting for CD consult. family already has him going to inpatient CD treatment after " "discharge at \"[facility] once his initial evaluation by CD completed here preferably.                 Interval History:   Assumed care reviewed chart.  Patient doing well.  Had some headache treated with Tylenol.  No tremors has not scored on CIWA protocol for last 8 hours review of all the other symptoms are negative.              Medications:       lisinopril  20 mg Oral Daily     LORazepam  1 mg Intravenous Q6H     metoprolol succinate ER  25 mg Oral QPM     pantoprazole  40 mg Oral QAM AC     potassium chloride  20 mEq Oral BID     IV infusion builder WITH LARGE additive list   Intravenous Q24H     traZODone  50 mg Oral At Bedtime     acetaminophen, hydrALAZINE, HYDROmorphone, LORazepam, magnesium sulfate, naloxone, potassium chloride, potassium chloride with lidocaine, potassium chloride, potassium chloride, prochlorperazine **OR** prochlorperazine **OR** prochlorperazine, temazepam               Physical Exam:   Blood pressure 122/87, pulse 63, temperature 98.9  F (37.2  C), temperature source Oral, resp. rate 16, height 1.676 m (5' 6\"), weight 63.7 kg (140 lb 8 oz), SpO2 97 %.  Wt Readings from Last 4 Encounters:   19 63.7 kg (140 lb 8 oz)   16 70.8 kg (156 lb)   16 63.5 kg (140 lb)         Vital Sign Ranges  Temperature Temp  Av.8  F (37.1  C)  Min: 98  F (36.7  C)  Max: 99.6  F (37.6  C)   Blood pressure Systolic (24hrs), Av , Min:122 , Max:153        Diastolic (24hrs), Av, Min:88, Max:114      Pulse Pulse  Av.4  Min: 52  Max: 96   Respirations Resp  Av.6  Min: 16  Max: 75   Pulse oximetry SpO2  Av.4 %  Min: 98 %  Max: 100 %         Intake/Output Summary (Last 24 hours) at 2019 0802  Last data filed at 2019 0705  Gross per 24 hour   Intake 2213.36 ml   Output 475 ml   Net 1738.36 ml       Constitutional: Sleeping, no apparent distress. Sitter in his room.   Lungs: Clear to auscultation bilaterally, no crackles or wheezing   Cardiovascular: " Regular rate and rhythm, normal S1 and S2, and no murmur noted   Abdomen: Normal bowel sounds, soft, non-distended, non-tender   Skin: No rashes, no cyanosis, no edema               Data:   All laboratory data reviewed

## 2019-07-25 NOTE — PLAN OF CARE
[id number]:  VS: Stable. C/o headache this AM that was resolved with PO tylenol.   GI: No acute issues. Tolerating regular diet, good appetite.   LS: Clear, diminished. Denies shortness of breath. No cough noted.   : Voiding without issue.   Neuro: Alert and oriented x4. CMS intact. CIWA score of 3 for slight tremor and headache. No ativan given this shift. VPM in place for impulsivity with mobility. Alarms also in use for safety.   Mobility: Up with SBA.   Disposition: TBD.  Awaiting consult with chemical dependency.

## 2019-07-25 NOTE — PROGRESS NOTES
DENNY  D/I: DENNY contacted Lory, chemical dependency counselor to check on when patient is scheduled to be assessed as patient is anticipating discharging soon.  DENNY requested a call back.  P: DENNY will continue to monitor and follow for a safe, discharge plan.    D/I: DENNY received vm from Lory stating that her schedule is full today and that her colleague is out.  Patient would not be seen by chem dep until tomorrow morning.  Lory stated that if patient is ready to discharge, patient can be discharged with an outpatient day/time.  DENNY called outpatient clinic @ [phone] and was informed that the first available assessment can be completed on 8/1.  DENNY contacted physician to determine which option would be most beneficial for the patient.  P: DENNY will continue to monitor and follow for a safe, discharge plan.    D/I: DENNY spoke to physician and requested that patient have a chem dep evaluation tomorrow prior to discharge.  DENNY notified nursing and notified Lory that the medical team to keep patient on the schedule to be seen at the hospital tomorrow.  P: DENNY will continue to monitor and follow for a safe, discharge plan.

## 2019-07-25 NOTE — PLAN OF CARE
Pt remains hospitalized for ETOH WD  CIWA score 1 slight tremor  Elevated BP, recheck 123/87, other vital signs stable, on RA, denies any pain/nausea  Up with SBA, voiding frequently, using urinal at times   IVF 75/hr, continues scheduled IV Ativan  VPM in place, alarms on for safety

## 2019-07-26 VITALS
BODY MASS INDEX: 21.9 KG/M2 | TEMPERATURE: 97.9 F | RESPIRATION RATE: 20 BRPM | HEART RATE: 84 BPM | HEIGHT: 66 IN | OXYGEN SATURATION: 99 % | WEIGHT: 136.3 LBS | SYSTOLIC BLOOD PRESSURE: 133 MMHG | DIASTOLIC BLOOD PRESSURE: 93 MMHG

## 2019-07-26 LAB
ALBUMIN SERPL-MCNC: 2.8 G/DL (ref 3.4–5)
ALP SERPL-CCNC: 94 U/L (ref 40–150)
ALT SERPL W P-5'-P-CCNC: 55 U/L (ref 0–70)
ANION GAP SERPL CALCULATED.3IONS-SCNC: 4 MMOL/L (ref 3–14)
AST SERPL W P-5'-P-CCNC: 99 U/L (ref 0–45)
BILIRUB SERPL-MCNC: 0.8 MG/DL (ref 0.2–1.3)
BUN SERPL-MCNC: 7 MG/DL (ref 7–30)
CALCIUM SERPL-MCNC: 8.7 MG/DL (ref 8.5–10.1)
CHLORIDE SERPL-SCNC: 107 MMOL/L (ref 94–109)
CO2 SERPL-SCNC: 25 MMOL/L (ref 20–32)
CREAT SERPL-MCNC: 0.56 MG/DL (ref 0.66–1.25)
ERYTHROCYTE [DISTWIDTH] IN BLOOD BY AUTOMATED COUNT: 13.3 % (ref 10–15)
GFR SERPL CREATININE-BSD FRML MDRD: >90 ML/MIN/{1.73_M2}
GLUCOSE SERPL-MCNC: 87 MG/DL (ref 70–99)
HCT VFR BLD AUTO: 32.6 % (ref 40–53)
HGB BLD-MCNC: 11.1 G/DL (ref 13.3–17.7)
MCH RBC QN AUTO: 36.4 PG (ref 26.5–33)
MCHC RBC AUTO-ENTMCNC: 34 G/DL (ref 31.5–36.5)
MCV RBC AUTO: 107 FL (ref 78–100)
PLATELET # BLD AUTO: 197 10E9/L (ref 150–450)
POTASSIUM SERPL-SCNC: 3.9 MMOL/L (ref 3.4–5.3)
PROT SERPL-MCNC: 6.6 G/DL (ref 6.8–8.8)
RBC # BLD AUTO: 3.05 10E12/L (ref 4.4–5.9)
SODIUM SERPL-SCNC: 136 MMOL/L (ref 133–144)
WBC # BLD AUTO: 6.7 10E9/L (ref 4–11)

## 2019-07-26 PROCEDURE — H0001 ALCOHOL AND/OR DRUG ASSESS: HCPCS

## 2019-07-26 PROCEDURE — 85027 COMPLETE CBC AUTOMATED: CPT | Performed by: INTERNAL MEDICINE

## 2019-07-26 PROCEDURE — 80053 COMPREHEN METABOLIC PANEL: CPT | Performed by: INTERNAL MEDICINE

## 2019-07-26 PROCEDURE — 25000132 ZZH RX MED GY IP 250 OP 250 PS 637: Performed by: INTERNAL MEDICINE

## 2019-07-26 PROCEDURE — 99239 HOSP IP/OBS DSCHRG MGMT >30: CPT | Performed by: INTERNAL MEDICINE

## 2019-07-26 PROCEDURE — 36415 COLL VENOUS BLD VENIPUNCTURE: CPT | Performed by: INTERNAL MEDICINE

## 2019-07-26 RX ORDER — TRAZODONE HYDROCHLORIDE 50 MG/1
50 TABLET, FILM COATED ORAL AT BEDTIME
Qty: 30 TABLET | Refills: 0 | Status: SHIPPED | OUTPATIENT
Start: 2019-07-26 | End: 2020-03-23

## 2019-07-26 RX ORDER — LANOLIN ALCOHOL/MO/W.PET/CERES
100 CREAM (GRAM) TOPICAL DAILY
Qty: 30 TABLET | Refills: 0 | Status: SHIPPED | OUTPATIENT
Start: 2019-07-27 | End: 2020-01-02

## 2019-07-26 RX ORDER — FOLIC ACID 1 MG/1
1 TABLET ORAL DAILY
Qty: 30 TABLET | Refills: 0 | Status: ON HOLD | OUTPATIENT
Start: 2019-07-27 | End: 2020-01-05

## 2019-07-26 RX ORDER — PANTOPRAZOLE SODIUM 40 MG/1
40 TABLET, DELAYED RELEASE ORAL
Qty: 30 TABLET | Refills: 0 | Status: SHIPPED | OUTPATIENT
Start: 2019-07-27 | End: 2020-01-02

## 2019-07-26 RX ORDER — MULTIVITAMIN,THERAPEUTIC
1 TABLET ORAL DAILY
Qty: 30 TABLET | Refills: 0 | Status: SHIPPED | OUTPATIENT
Start: 2019-07-27 | End: 2020-01-02

## 2019-07-26 RX ORDER — METOPROLOL SUCCINATE 50 MG/1
50 TABLET, EXTENDED RELEASE ORAL EVERY EVENING
Status: DISCONTINUED | OUTPATIENT
Start: 2019-07-26 | End: 2019-07-26 | Stop reason: HOSPADM

## 2019-07-26 RX ORDER — METOPROLOL SUCCINATE 50 MG/1
50 TABLET, EXTENDED RELEASE ORAL EVERY EVENING
Qty: 30 TABLET | Refills: 0 | Status: SHIPPED | OUTPATIENT
Start: 2019-07-26 | End: 2020-03-23

## 2019-07-26 RX ADMIN — Medication 100 MG: at 09:39

## 2019-07-26 RX ADMIN — PANTOPRAZOLE SODIUM 40 MG: 40 TABLET, DELAYED RELEASE ORAL at 06:42

## 2019-07-26 RX ADMIN — ACETAMINOPHEN 650 MG: 325 TABLET, FILM COATED ORAL at 02:38

## 2019-07-26 RX ADMIN — POTASSIUM CHLORIDE 20 MEQ: 1.5 POWDER, FOR SOLUTION ORAL at 09:39

## 2019-07-26 RX ADMIN — THERA TABS 1 TABLET: TAB at 09:39

## 2019-07-26 RX ADMIN — FOLIC ACID 1 MG: 1 TABLET ORAL at 09:39

## 2019-07-26 ASSESSMENT — ACTIVITIES OF DAILY LIVING (ADL)
ADLS_ACUITY_SCORE: 13

## 2019-07-26 ASSESSMENT — MIFFLIN-ST. JEOR: SCORE: 1451

## 2019-07-26 NOTE — PLAN OF CARE
Pt remains hospitalized for ETOH WD  CIWA scores 1 for tremor   Max temp 99.1, recheck 98.8, elevated BP's, on RA, denies any nausea  C/O HA Tylenol x 1 given   PIV saline locked   Up with SBA, using urinal, voiding frequently  Possible discharge today once seen by CD

## 2019-07-26 NOTE — CONSULTS
7/26/19 chem dep consult completed.    Total time spent: 55 minutes, face-to-face    Met with patient and we completed full evaluation.  Recommending residential treatment which patient is agreeable to.  We discussed [facility] programs: [facility], [facility] and [facility].  He did sign release of information for programs and I will fax completed Rule 25 to them for placement.  Patient signed an release of information for his wife, Ashleigh, and he is aware they can contact me with any questions.  I did inform patient that he more than likely will discharge to home before going to treatment.  I will contact him once referrals are sent and as I am updated from programs on status of waitlists and placement.    Lory Gracia, [facility]  [phone]

## 2019-07-26 NOTE — PROGRESS NOTES
Pt d/c'd home with wife. Discharge instructions given & verbalized understanding. Discharge meds sent with pt.

## 2019-07-26 NOTE — PROGRESS NOTES
[facility]      ADULT CD ASSESSMENT ADDENDUM      Patient Name: Chalo Freire  Cell Phone:   Home: [phone] (home) none (work)   Mobile:   Telephone Information:   Mobile [phone]       Email:  [email or web address]  Emergency Contact: Ashleigh Freire   Tel: [phone]    The patient reported being:      With which race do you identify? / Black    Initial Screening Questions     1. Are you currently having severe withdrawal symptoms that are putting yourself or others in danger?  No    2. Are you currently having severe medical problems that require immediate attention?  No    3. Are you currently having severe emotional or behavioral problems that are putting yourself or others at risk of harm?  No    4. Do you have sufficient reading skills that will enable you to understand written materials, including the program rules and client rights materials?  Yes     Family History and other additional information     Who raised you? (parents, grandparents, adoptive parents, step-parents, etc.)    Mother    Please tell me what it was like growing up in your family. (please include any history of substance abuse, mental health issues, emotional/physical/sexual abuse, forms of discipline, and support)     Grew up in [place].  Father wasn't around much, oldest in family, so I took on the role of caring for my 7 other siblings.  Come from a family where alcohol was traditional and normal.    Do you have any children or Stepchildren? No    Are you being investigated by Child Protection Services? No    Do you have a child protection worker, probation office or ?  No    How would you describe your current finances?  Doing okay    If you are having problems, (unpaid bills, bankruptcy, IRS problems) please explain:  No    If working or a student are you able to function appropriately in that setting? Yes     Describe your preferred learning style:  Not specified    What  are your some of your personal strengths?  Unknown    Do you currently participate in community kemi activities, such as attending Bahai, temple, Taoist or Sikh services?  Yes, explain: I am a Mormonism    How does your spirituality impact your recovery?  Yes    Do you currently self-administer your medications?  Yes    Have you ever had to lie to people important to you about how much you zarco?   No   Have you ever felt the need to bet more and more money?   No   Have you ever attempted treatment for a gambling problem?   No   Have you ever touched or fondled someone else inappropriately or forced them to have sex with you against their will?   No   Are you or have you ever been a registered sex offender?   No   Is there any history of sexual abuse in your family? No   Have you ever felt obsessed by your sexual behavior, such as having sex with many partners, masturbating often, using pornography often?   No     Have you ever received therapy or stayed in the hospital for mental health problems?   No     Have you ever hurt yourself, such as cutting, burning or hitting yourself?   No     Have you ever purged, binged or restricted yourself as a way to control your weight?   No     Are you on a special diet?   Yes, explain: I cannot eat shellfish     Do you have any concerns regarding your nutritional status?   No     Have you had any appetite changes in the last 3 months?   Yes, explain: I really don't eat that much because I don't always have an appetite   Have you had weight loss or weight gain of more than 10 lbs in the last 3 months?   If patient gained or lost more than 10 lbs, then refer to program RN / attending Physician for assessment.   No   Was the patient informed of BMI?       No   Have you engaged in any risk-taking behavior that would put you at risk for exposure to blood-borne or sexually transmitted diseases?   No   Do you have any dental problems?   No   Have you ever lived through any  trauma or stressful life events?   Yes, explain: the only stress has been the family background and watching the other kids   In the past month, have you had any of the following symptoms related to the trauma listed above? (dreams, intense memories, flashbacks, physical reactions, etc.)   No   Have you ever believed people were spying on you, or that someone was plotting against you or trying to hurt you?   No   Have you ever believed someone was reading your mind or could hear your thoughts or that you could actually read someone's mind or hear what another person was thinking?   No   Have you ever believed that someone of some force outside of yourself was putting thoughts into your mind or made you act in a way that was not your usual self?  Have you ever though you were possessed?   No   Have you ever believed you were being sent special messages through the TV, radio or newspaper?   No   Have you ever heard things other people couldn't hear, such as voices or other noises?   No   Have you ever had visions when you were awake?  Or have you ever seen things other people couldn't see?   No   Do you have a valid 's license?    Yes     PHQ-9, AKIRA-7 and Suicide Risk Assessment   PHQ-9 on 7/26/2019 AKIRA-7 on 7/26/2019   The patient's PHQ-9 score was not assessed   The patient's AKIRA-7 score was not assessed       [place]-Suicide Severity Rating Scale   Suicide Ideation   1.) Have you ever wished you were dead or that you could go to sleep and not wake up?     Lifetime:  No   Past Month:  No     2.) Have you actually had any thoughts of killing yourself?   Lifetime:  No   Past Month:  No     3.) Have you been thinking about how you might do this?     Lifetime:  No   Past Month:  No     4.) Have you had these thoughts and had some intention of acting on them?     Lifetime:  No   Past Month:  No     5.) Have you started to work out the details of how to kill yourself?   Lifetime:  No   Past Month:  No     6.) Do  you intend to carry out this plan?      Lifetime:  No   Past Month:  No     Intensity of Ideation   Intensity of ideation (1 being least severe, 5 being most severe):     Lifetime:  The patient denied ever having any suicidal thoughts in life.   Past Month:  The patient denied having any suicidal thoughts within the past month.     How often do you have these thoughts?  The patient denied ever having any suicidal thoughts in life.     When you have the thoughts how long do they last?  The patient denied ever having any suicidal thoughts in life.     Can you stop thinking about killing yourself or wanting to die if you want to?  The patient denied ever having any suicidal thoughts in life.     Are there things - anyone or anything (i.e. family, Faith, pain of death) that stopped you from wanting to die or acting on thoughts of suicide?  Does not apply     What sort of reasons did you have for thinking about wanting to die or killing yourself (ie end pain, stop how you were feeling, get attention or reaction, revenge)?  Does not apply     Suicidal Behavior   (Suicide Attempt) - Have you made a suicide attempt?     Lifetime:  The patient had never made a suicide attempt.   Past Month:  The patient had not made a suicide attempt within the past month.     Have you engaged in self-harm (non-suicidal self-injury)?  The patient denied having any history of engaging in self-harm (non-suicidal self-injury).     (Interrupted Attempt) - Has there been a time when you started to do something to end your life but someone or something stopped you before you actually did anything?  The patient denied having any history of an interrupted suicide attempt.     (Aborted or Self-Interrupted Attempt) - Has there been a time when you started to do something to try to end your life but you stopped yourself before you actually did anything?  The patient denied having any history of an aborted or self-interrupted suicide attempt.    "  (Preparatory Acts of Behavior) - Have you taken any steps towards making suicide attempt or preparing to kill yourself (such as collecting pills, getting a gun, giving valuables away or writing a suicide note)?  The patient denied having any history of taking any steps towards making a suicide attempt or preparing to kill self.     Actual Lethality/Medical Damage:  The patient denied ever making a suicidal attempt.       2008  [facility]  Used with permission by Keesha Lamas, PhD.       Guide to C-SSRS Risk Ratings   NO IDEATION:  with no active thoughts IDEATION: with a wish to die. IDEATION: with active thoughts. Risk Ratings   If Yes No No 0 - Very Low Risk   If NA Yes No 1 - Low Risk   If NA Yes Yes 2 - Low/moderate risk   IDEATION: associated thoughts of methods without intent or plan INTENT: Intent to follow through on suicide PLAN: Plan to follow through on suicide Risk Ratings cont...   If Yes No No 3 - Moderate Risk   If Yes Yes No 4 - High Risk   If Yes Yes Yes 5 - High Risk   The patient's ADDITIONAL RISK FACTORS and lack of PROTECTIVE FACTORS may increase their overall suicide risk ratings.     Additional Risk Factors:    Alcohol use   Protective Factors:    Having people in his/her life that would prevent the patient from considering a suicide attempt (i.e. young children, spouse, parents, etc.)     Having easy access to supportive family members     Risk Status   Past month:0. - Very Low Risk:  Evaluation Counselors:  Document in Epic / [employer] to counselor \"Very Low Risk\".      Treatment Counselors:  Reassess upon admission as applicable, assess weekly in progress notes under Dimension 3 and summarize in Discharge / Treatment summary under Dim[facility].    Past 24 hours:0. - Very Low Risk:  Evaluation Counselors:  Document in Epic / [employer] to counselor \"Very Low Risk\".      Treatment Counselors:  Reassess upon admission as applicable, assess weekly in progress notes " under Dimension 3 and summarize in Discharge / Treatment summary under [facility].   Additional information to support suicide risk rating: There was no additional information to provide at this time.     Mental Health Status   Physical Appearance/Attire: Attire appropriate to age/situation   Hygiene: well groomed   Eye Contact: at examiner   Speech Rate:  regular   Speech Volume: regular   Speech Quality: fluid   Cognitive/Perceptual:  reality based   Cognition: memory intact    Judgment: intact   Insight: intact   Orientation:  time, place, person and situation   Thought: logical    Hallucinations:  none   General Behavioral Tone: cooperative   Psychomotor Activity: no problem noted   Gait:  no problem   Mood: appropriate   Affect: congruence/appropriate   Counselor Notes: NA     Criteria for Diagnosis: DSM-5 Criteria for Substance Use Disorders      Alcohol Use Disorder Severe - 303.90 (F10.20)    Level of Care   I.) Intoxication and Withdrawal: 0   II.) Biomedical:  1   III.) Emotional and Behavioral:  1   IV.) Readiness to Change:  0   V.) Relapse Potential: 4   VI.) Recovery Environmental: 2     Initial Problem List     The patient lacks relapse prevention skills  The patient lacks a sober peer support network    Patient/Client is willing to follow treatment recommendations.  Yes    Counselor: Latrice Gracia [facility]    Vulnerable Adult Checklist for [facility]:     This [facility] patient, or other Residential/Lodging CD Treatment patient is a categorical Vulnerable Adult according to [place] Statute 626.5572 subdivision 21.    Susceptibility to abuse by others     1.  Have you ever been emotionally abused by anyone?          No    2.  Have you ever been bullied, or physically assaulted by anyone?        No    3.  Have you ever been sexually taken advantage of or sexually assaulted?        No    4.  Have you ever been financially taken advantage of?        No    5.  Have you ever hurt yourself  intentionally such as burns or cuts?       No    Risk of abusing other vulnerable adults     1.  Have you ever bullied, berated or emotionally degraded someone else?       No    2.  Have you ever financially taken advantage of someone else?       No    3.  Have you ever sexually exploited or assaulted another person?       No    4.  Have you ever gotten into fights, verbal arguments or physically assaulted someone?          No    Based on the above information:    This [facility] patient, or other Residential/Lodging CD Treatment patient is a categorical Vulnerable Adult according to [facility] Statue 626.5572 subdivision 21.          This person has a history of abuse, but is assessed as stable and not in need of an individual abuse prevention plan beyond the program abuse prevention plan.

## 2019-07-26 NOTE — DISCHARGE SUMMARY
[facility]  Discharge Summary  Hospitalist      Date of Admission:  7/21/2019  Date of Discharge:  7/26/2019  Provider:  Annalise Mancia MD  Date of Service (when I last saw the patient): 07/26/19      Primary Provider: Marciano Salazar          Discharge Diagnosis:   Discharge Diagnoses   Acute alcohol withdrawal  Alcohol-related hepatitis  Hypertension  Hyperkalemia  Lactic acidosis  GERD      Other medical issues:  Past Medical History:   Diagnosis Date     Falls      History of low potassium      Hypertension           History of Present Illness   Chalo Freire is an 44 year old male who presented with alcohol withdrawal.  Please see the admission history and physical for full details.    Hospital Course     Chalo Freire was admitted on 7/21/2019.  His past medical history significant for hypertension, hypokalemia and alcohol dependence and abuse.  He presented to the emergency room on 7/21/2019 with nausea and vomiting he was in acute alcohol withdrawal.  Patient was requiring Precedex and required admission to ICU initially.  He was transferred back to medicine floor on 7/22/2019.  He was monitored and CIWA protocol was continued while on the floors.  He has significantly improved required no Ativan last 24 hours.  Chemical dependency consult was done during hospitalization and recommended inpatient [facility] depth treatment.  Patient and family are in communication with [facility] for treatment.  Patient will be discharged to home until he gets treatment as inpatient in [facility].  Patient has a safe plan he will be with wife.  Family is committed for patient successful treatment.  Psychiatry was consulted during hospitalization and recommended aggressive chemical dependency treatment.     Hospitalization blood pressure medications were adjusted to get better control once patient  completed his withdrawal symptoms.  His metoprolol dose was increased.  Patient is advised to keep a record  "of his blood pressures.  He will need to follow-up with his primary care provider.       The following problems were addressed during his hospitalization:    Acute alcohol withdrawals:  Resolved no evidence of acute withdrawal at this time  -initially required precedex  --Oral vitamins  -- resume diet   -- family already has him going to inpatient CD treatment after discharge at \"[facility]\"  - CD consult and assessment completed as inpatient  - psych consulted      Alcohol related hepatitis with nausea and vomiting:    --  lipase level is 255.   --Nausea vomiting resolved tolerating diet  -- Antiemetics as needed.    -- CT showed  fatty liver, no PE or pneumonia      Hypertension:    -- resumed metoprolol, lisinopril.  --Increased dose of metoprolol   --Better blood pressure control prior to discharge with increased dose of metoprolol.  --Patient has been asymptomatic with hypertension      Hypokalemia:  -- replaced      Lactic acidosis:  Resolved .suspect that this was  due to dehydration and frequent episodes of vomiting.  -- Recheck of lactic acid back to normal      Epigastric pain: Resolved  -- normal lipase level.        Hyperglycemia: Resolved   --Blood glucose has been less than 100 after the initial hypoglycemia likely due to alcohol use       Significant Results and Procedures   As noted above    Pending Results   Unresulted Labs Ordered in the Past 30 Days of this Admission     Date and Time Order Name Status Description    7/21/2019 1634 Blood culture ONE site Preliminary     7/21/2019 1605 Blood culture ONE site Preliminary           Code Status   Full Code       Primary Care Physician   BOB ESPARZA    Physical Exam   Temp: 97.9  F (36.6  C) Temp src: Axillary BP: (!) 133/93 Pulse: 84 Heart Rate: 91 Resp: 20 SpO2: 99 % O2 Device: None (Room air)    Vitals:    07/24/19 0730 07/25/19 0500 07/26/19 0553   Weight: 63 kg (138 lb 14.4 oz) 63.7 kg (140 lb 8 oz) 61.8 kg (136 lb 4.8 oz)     Vital Signs " with Ranges  Temp:  [97.9  F (36.6  C)-99.1  F (37.3  C)] 97.9  F (36.6  C)  Pulse:  [72-84] 84  Heart Rate:  [64-92] 91  Resp:  [18-20] 20  BP: (121-165)/() 133/93  SpO2:  [99 %-100 %] 99 %  I/O last 3 completed shifts:  In: 1336 [P.O.:880; I.V.:456]  Out: 1475 [Urine:1475]    Constitutional: Awake, alert, cooperative, no apparent distress, and appears stated age.  Respiratory: No increased work of breathing, good air exchange, clear to auscultation bilaterally, no crackles or wheezing.  Cardiovascular: Normal apical impulse,normal S1 and S2,   GI: normal bowel sounds, soft, non-distended, non-tender.      Discharge Disposition   Discharged to home    Consultations This Hospital Stay   CHEMICAL DEPENDENCY IP CONSULT  SOCIAL WORK IP CONSULT  PSYCHIATRY IP CONSULT    Time Spent on this Encounter   I, Annalise Mancia, personally saw the patient today and spent greater than 30 minutes discharging this patient.    Discharge Orders      Reason for your hospital stay    Please refer to discharge summary.  Briefly admitted for alcohol withdrawal     Follow-up and recommended labs and tests     Follow up with primary care provider, BOB ESPARZA, within 7 days for hospital follow- up.  No follow up labs or test are needed.  Memory care provider to assess blood pressure and adjust medications as appropriate please keep a record of your blood pressure and follow with primary care provider.  Please follow-up with chemical dependency treatment program.  Follow-up with [facility] as recommended.  Follow-up with psychiatrist as outpatient.  Please avoid any use of alcohol.  Please call or come if there are any new or worsening symptoms.     Activity    Your activity upon discharge: activity as tolerated     Monitor and record    blood pressure daily     Full Code     Diet    Follow this diet upon discharge: Orders Placed This Encounter      Regular Diet Adult     Discharge Medications   Current Discharge Medication  List      START taking these medications    Details   folic acid (FOLVITE) 1 MG tablet Take 1 tablet (1 mg) by mouth daily  Qty: 30 tablet, Refills: 0    Associated Diagnoses: Alcohol withdrawal syndrome with complication (H)      multivitamin, therapeutic (THERA-VIT) TABS tablet Take 1 tablet by mouth daily  Qty: 30 tablet, Refills: 0    Associated Diagnoses: Alcohol withdrawal syndrome with complication (H)      pantoprazole (PROTONIX) 40 MG EC tablet Take 1 tablet (40 mg) by mouth every morning (before breakfast)  Qty: 30 tablet, Refills: 0    Associated Diagnoses: Gastroesophageal reflux disease with esophagitis      traZODone (DESYREL) 50 MG tablet Take 1 tablet (50 mg) by mouth At Bedtime  Qty: 30 tablet, Refills: 0    Associated Diagnoses: Current mild episode of major depressive disorder, unspecified whether recurrent (H)      vitamin B1 (THIAMINE) 100 MG tablet Take 1 tablet (100 mg) by mouth daily  Qty: 30 tablet, Refills: 0    Associated Diagnoses: Alcohol withdrawal syndrome with complication (H)         CONTINUE these medications which have CHANGED    Details   metoprolol succinate ER (TOPROL-XL) 50 MG 24 hr tablet Take 1 tablet (50 mg) by mouth every evening  Qty: 30 tablet, Refills: 0    Associated Diagnoses: Benign essential hypertension         CONTINUE these medications which have NOT CHANGED    Details   lisinopril (PRINIVIL,ZESTRIL) 20 MG tablet Take 1 tablet (20 mg) by mouth daily  Qty: 30 tablet, Refills: 0    Comments: HTN  Associated Diagnoses: Alcohol withdrawal, uncomplicated (H)      potassium chloride (KLOR-CON) 20 MEQ packet Take 20 mEq by mouth 2 times daily           Allergies   Allergies   Allergen Reactions     Hydrocodone      Data   Most Recent 3 CBC's:  Recent Labs   Lab Test 07/26/19 0644 07/25/19  0658 07/24/19  0714   WBC 6.7 5.2 5.9   HGB 11.1* 10.6* 10.6*   * 105* 103*    156 142*      Most Recent 3 BMP's:  Recent Labs   Lab Test 07/26/19 0644 07/25/19  0658  07/24/19  0714    142 139   POTASSIUM 3.9 4.0 3.4   CHLORIDE 107 112* 108   CO2 25 26 25   BUN 7 7 3*   CR 0.56* 0.56* 0.47*   ANIONGAP 4 4 6   BELIA 8.7 8.4* 7.7*   GLC 87 86 84     Most Recent 2 LFT's:  Recent Labs   Lab Test 07/26/19  0644 07/25/19  0658   AST 99* 98*   ALT 55 54   ALKPHOS 94 105   BILITOTAL 0.8 1.0     Most Recent INR's and Anticoagulation Dosing History:  Anticoagulation Dose History     Recent Dosing and Labs Latest Ref Rng & Units 5/23/2016    INR 0.86 - 1.14 0.98        Most Recent 3 Troponin's:  Recent Labs   Lab Test 07/21/19  2357 07/21/19  2043 07/21/19  1558   TROPI 0.037 0.033 <0.015     Most Recent Cholesterol Panel:No lab results found.  Most Recent 6 Bacteria Isolates From Any Culture (See EPIC Reports for Culture Details):  Recent Labs   Lab Test 07/21/19  1652 07/21/19  1557   CULT No growth after 5 days No growth after 5 days     Most Recent TSH, T4 and A1c Labs:No lab results found.  Results for orders placed or performed during the hospital encounter of 07/21/19   CT Chest Pulmonary Embolism w Contrast    Narrative    CT CHEST PULMONARY EMBOLISM WITH CONTRAST 7/21/2019 6:10 PM     HISTORY: Chest pain.     TECHNIQUE: Thin section axial images are performed from the thoracic  inlet to the lung bases utilizing 66 mL of Isovue 370 IV contrast  without adverse event. Coronal reformatted images are also generated.  Radiation dose for this scan was reduced using automated exposure  control, adjustment of the mA and/or kV according to patient size, or  iterative reconstruction technique.    FINDINGS:   Chest: The lungs are clear. No infiltrate or consolidation. No pleural  or pericardial fluid. Heart is normal in size. Esophagus is  unremarkable. Thyroid gland appears normal in size where imaged. No  enlarged lymph nodes in the chest or axillas. No evidence of pulmonary  embolism. Thoracic aorta is unremarkable. Limited images upper abdomen  demonstrate hepatomegaly with diffuse  fatty infiltration of liver. The  spleen is normal in size. Bone window examination is within normal  limits.      Impression    IMPRESSION: Hepatomegaly with diffuse fatty infiltration of liver. No  evidence of pulmonary embolism. Thoracic aorta is unremarkable.    GUERLINE ARMSTRONG MD           Disclaimer: This note consists of symbols derived from keyboarding, dictation and/or voice recognition software. As a result, there may be errors in the script that have gone undetected. Please consider this when interpreting information found in this chart.

## 2019-07-26 NOTE — PROGRESS NOTES
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 drugs, supportive or antagonistic to the person making changes.      No concerns reported.    9. Criminal justice history: Gather current/recent history and any significant history related to substance use--Arrests? Convictions? Circumstances? Alcohol or drug involvement? Sentences? Still on probation or parole? Expectations of the court? Current court order? Any sex offenses - lifetime? What level? (DSM)    Denies.    10. What obstacles exist to participating in treatment? (Time off work, childcare, funding, transportation, pending [facility] time, living situation)     The patient denied having any obstacles for participating in substance abuse treatment.    Dimension VI Ratings   Recovery environment - The placing authority must use the criteria in Dimension VI to determine a client s recovery environment.   RISK DESCRIPTIONS - Severity ratin Client is engaged in structured, meaningful activity, but peers, family, significant other, and living environment are unsupportive, or there is criminal justice involvement by the client or among the client's peers, significant others, or in the client's living environment.    REASONS SEVERITY WAS ASSIGNED - (What support does the person have for making  changes? What structure/stability does the person have in his or her daily life that will increase the likelihood that changes can be sustained? What problems exist in the person s environment that will jeopardize getting/staying clean and sober?)     Patient reportsa he is  and has no children.  His mother does live with he and wife.  He is employed full-time and denies any job concerns.  He reports he drinks everyday and lacks any other meaningful activity.  He denies any legal issues.         Client Choice/Exceptions   Would you like services specific to language, age, gender, culture, Muslim preference, race, ethnicity, sexual orientation or disability?  No    What particular treatment choices and options would you like to have? residential    Do you have a preference for a particular treatment program? open    Criteria for Diagnosis     Criteria for Diagnosis  DSM-5 Criteria for Substance Use Disorder  Instructions: Determine whether the client currently meets the criteria for Substance Use Disorder using the diagnostic criteria in the DSM-V pp.481-589. Current means during the most recent 12 months outside a facility that controls access to substances    Category of Substance Severity (ICD-10 Code / DSM 5 Code)     Alcohol Use Disorder Severe  (10.20) (303.90)   Cannabis Use Disorder The patient does not meet the criteria for a Cannabis use disorder.   Hallucinogen Use Disorder The patient does not meet the criteria for a Hallucinogen use disorder.   Inhalant Use Disorder The patient does not meet the criteria for an Inhalant use disorder.   Opioid Use Disorder The patient does not meet the criteria for an Opioid use disorder.   Sedative, Hypnotic, or Anxiolytic Use Disorder The patient does not meet the criteria for a Sedative/Hypnotic use disorder.   Stimulant Related Disorder The patient does not meet the criteria for a Stimulant use disorder.   Tobacco Use Disorder The patient does not meet the  criteria for a Tobacco use disorder.   Other (or unknown) Substance Use Disorder The patient does not meet the criteria for a Other (or unknown) Substance use disorder.       Collateral Contact Summary   Number of contacts made: 2    Contact with referring person:  Yes    If court related records were reviewed, summarize here: No court records had been reviewed at the time of this documentation.    Information from collateral contacts supported/largely agreed with information from the client and associated risk ratings.      Rule 25 Assessment Summary and Plan   's Recommendation    Patient is recommended to attend a residential treatment program.      Collateral Contacts     Name:    Ashleigh Freire   Relationship:    wife   Phone Number:    [phone] Releases:    Yes     No information was obtained at this time but did discuss treatment referrals.      Collateral Contacts     Name:    [facility]   Relationship:    Medical records   Phone Number:    none   Releases:    No     EHR was reviewed.    ollateral Contacts      A problematic pattern of alcohol/drug use leading to clinically significant impairment or distress, as manifested by at least two of the following, occurring within a 12-month period:    2.) There is a persistent desire or unsuccessful efforts to cut down or control alcohol/drug use  3.) A great deal of time is spent in activities necessary to obtain alcohol, use alcohol, or recover from its effects.  6.) Continued alcohol use despite having persistent or recurrent social or interpersonal problems caused or exacerbated by the effects of alcohol/drug.  9.) Alcohol/drug use is continued despite knowledge of having a persistent or recurrent physical or psychological problem that is likely to have been caused or exacerbated by alcohol.  10.) Tolerance, as defined by either of the following: A need for markedly increased amounts of alcohol/drug to achieve intoxication or desired  effect. and A markedly diminished effect with continued use of the same amount of alcohol/drug.  11.) Withdrawal, as manifested by either of the following: The characteristic withdrawal syndrome for alcohol/drug (refer to Criteria A and B of the criteria set for alcohol/drug withdrawal). and Alcohol/drug (or a closely related substance, such as a benzodiazepine) is taken to relieve or avoid withdrawal symptoms.      Specify if: In early remission:  After full criteria for alcohol/drug use disorder were previously met, none of the criteria for alcohol/drug use disorder have been met for at least 3 months but for less than 12 months (with the exception that Criterion A4,  Craving or a strong desire or urge to use alcohol/drug  may be met).     In sustained remission:   After full criteria for alcohol use disorder were previously met, none of the criteria for alcohol/drug use disorder have been met at any time during a period of 12 months or longer (with the exception that Criterion A4,  Craving or strong desire or urge to use alcohol/drug  may be met).   Specify if:   This additional specifier is used if the individual is in an environment where access to alcohol is restricted.    Mild: Presence of 2-3 symptoms  Moderate: Presence of 4-5 symptoms  Severe: Presence of 6 or more symptoms

## 2019-07-27 LAB
BACTERIA SPEC CULT: NO GROWTH
BACTERIA SPEC CULT: NO GROWTH
Lab: NORMAL
Lab: NORMAL
SPECIMEN SOURCE: NORMAL
SPECIMEN SOURCE: NORMAL

## 2019-11-05 ENCOUNTER — HEALTH MAINTENANCE LETTER (OUTPATIENT)
Age: 44
End: 2019-11-05

## 2020-01-02 ENCOUNTER — APPOINTMENT (OUTPATIENT)
Dept: CT IMAGING | Facility: CLINIC | Age: 45
DRG: 897 | End: 2020-01-02
Attending: EMERGENCY MEDICINE
Payer: COMMERCIAL

## 2020-01-02 ENCOUNTER — APPOINTMENT (OUTPATIENT)
Dept: GENERAL RADIOLOGY | Facility: CLINIC | Age: 45
DRG: 897 | End: 2020-01-02
Attending: EMERGENCY MEDICINE
Payer: COMMERCIAL

## 2020-01-02 ENCOUNTER — HOSPITAL ENCOUNTER (INPATIENT)
Facility: CLINIC | Age: 45
LOS: 3 days | Discharge: HOME | DRG: 897 | End: 2020-01-05
Attending: EMERGENCY MEDICINE | Admitting: INTERNAL MEDICINE
Payer: COMMERCIAL

## 2020-01-02 DIAGNOSIS — R29.6 FALLS FREQUENTLY: ICD-10-CM

## 2020-01-02 DIAGNOSIS — S22.39XD CLOSED FRACTURE OF ONE RIB WITH ROUTINE HEALING, UNSPECIFIED LATERALITY, SUBSEQUENT ENCOUNTER: ICD-10-CM

## 2020-01-02 DIAGNOSIS — F10.920 ALCOHOLIC INTOXICATION WITHOUT COMPLICATION (H): ICD-10-CM

## 2020-01-02 PROBLEM — F10.929 ALCOHOL INTOXICATION (H): Status: ACTIVE | Noted: 2020-01-02

## 2020-01-02 LAB
ALBUMIN SERPL-MCNC: 4 G/DL (ref 3.4–5)
ALBUMIN UR-MCNC: 20 MG/DL
ALP SERPL-CCNC: 146 U/L (ref 40–150)
ALT SERPL W P-5'-P-CCNC: 60 U/L (ref 0–70)
AMPHETAMINES UR QL SCN: NEGATIVE
ANION GAP SERPL CALCULATED.3IONS-SCNC: 12 MMOL/L (ref 3–14)
APPEARANCE UR: CLEAR
APTT PPP: 31 SEC (ref 22–37)
AST SERPL W P-5'-P-CCNC: 233 U/L (ref 0–45)
BARBITURATES UR QL: NEGATIVE
BASOPHILS # BLD AUTO: 0 10E9/L (ref 0–0.2)
BASOPHILS NFR BLD AUTO: 0.7 %
BENZODIAZ UR QL: NEGATIVE
BILIRUB DIRECT SERPL-MCNC: 0.4 MG/DL (ref 0–0.2)
BILIRUB SERPL-MCNC: 0.9 MG/DL (ref 0.2–1.3)
BILIRUB UR QL STRIP: NEGATIVE
BUN SERPL-MCNC: 5 MG/DL (ref 7–30)
CALCIUM SERPL-MCNC: 8.5 MG/DL (ref 8.5–10.1)
CANNABINOIDS UR QL SCN: NEGATIVE
CHLORIDE SERPL-SCNC: 107 MMOL/L (ref 94–109)
CO2 SERPL-SCNC: 22 MMOL/L (ref 20–32)
COCAINE UR QL: NEGATIVE
COLOR UR AUTO: ABNORMAL
CREAT SERPL-MCNC: 0.81 MG/DL (ref 0.66–1.25)
DIFFERENTIAL METHOD BLD: ABNORMAL
EOSINOPHIL # BLD AUTO: 0 10E9/L (ref 0–0.7)
EOSINOPHIL NFR BLD AUTO: 0.2 %
ERYTHROCYTE [DISTWIDTH] IN BLOOD BY AUTOMATED COUNT: 14 % (ref 10–15)
ETHANOL SERPL-MCNC: 0.39 G/DL
GFR SERPL CREATININE-BSD FRML MDRD: >90 ML/MIN/{1.73_M2}
GLUCOSE BLDC GLUCOMTR-MCNC: 108 MG/DL (ref 70–99)
GLUCOSE SERPL-MCNC: 110 MG/DL (ref 70–99)
GLUCOSE UR STRIP-MCNC: NEGATIVE MG/DL
HCT VFR BLD AUTO: 35.3 % (ref 40–53)
HGB BLD-MCNC: 12.1 G/DL (ref 13.3–17.7)
HGB UR QL STRIP: NEGATIVE
IMM GRANULOCYTES # BLD: 0 10E9/L (ref 0–0.4)
IMM GRANULOCYTES NFR BLD: 0.7 %
INR PPP: 1.03 (ref 0.86–1.14)
KETONES UR STRIP-MCNC: 10 MG/DL
LEUKOCYTE ESTERASE UR QL STRIP: NEGATIVE
LYMPHOCYTES # BLD AUTO: 2.3 10E9/L (ref 0.8–5.3)
LYMPHOCYTES NFR BLD AUTO: 55.2 %
MAGNESIUM SERPL-MCNC: 1.8 MG/DL (ref 1.6–2.3)
MCH RBC QN AUTO: 34.6 PG (ref 26.5–33)
MCHC RBC AUTO-ENTMCNC: 34.3 G/DL (ref 31.5–36.5)
MCV RBC AUTO: 101 FL (ref 78–100)
MONOCYTES # BLD AUTO: 0.8 10E9/L (ref 0–1.3)
MONOCYTES NFR BLD AUTO: 18.6 %
MUCOUS THREADS #/AREA URNS LPF: PRESENT /LPF
NEUTROPHILS # BLD AUTO: 1 10E9/L (ref 1.6–8.3)
NEUTROPHILS NFR BLD AUTO: 24.6 %
NITRATE UR QL: NEGATIVE
NRBC # BLD AUTO: 0 10*3/UL
NRBC BLD AUTO-RTO: 0 /100
OPIATES UR QL SCN: NEGATIVE
PCP UR QL SCN: NEGATIVE
PH UR STRIP: 6 PH (ref 5–7)
PLATELET # BLD AUTO: 199 10E9/L (ref 150–450)
POTASSIUM SERPL-SCNC: 3.2 MMOL/L (ref 3.4–5.3)
PROT SERPL-MCNC: 8.5 G/DL (ref 6.8–8.8)
RBC # BLD AUTO: 3.5 10E12/L (ref 4.4–5.9)
RBC #/AREA URNS AUTO: 1 /HPF (ref 0–2)
SODIUM SERPL-SCNC: 141 MMOL/L (ref 133–144)
SOURCE: ABNORMAL
SP GR UR STRIP: 1 (ref 1–1.03)
SQUAMOUS #/AREA URNS AUTO: <1 /HPF (ref 0–1)
TROPONIN I SERPL-MCNC: <0.015 UG/L (ref 0–0.04)
UROBILINOGEN UR STRIP-MCNC: NORMAL MG/DL (ref 0–2)
WBC # BLD AUTO: 4.2 10E9/L (ref 4–11)
WBC #/AREA URNS AUTO: <1 /HPF (ref 0–5)

## 2020-01-02 PROCEDURE — 71045 X-RAY EXAM CHEST 1 VIEW: CPT

## 2020-01-02 PROCEDURE — 80320 DRUG SCREEN QUANTALCOHOLS: CPT | Performed by: EMERGENCY MEDICINE

## 2020-01-02 PROCEDURE — 0042T CT HEAD PERFUSION WITH CONTRAST: CPT

## 2020-01-02 PROCEDURE — 85730 THROMBOPLASTIN TIME PARTIAL: CPT | Performed by: EMERGENCY MEDICINE

## 2020-01-02 PROCEDURE — 00000146 ZZHCL STATISTIC GLUCOSE BY METER IP

## 2020-01-02 PROCEDURE — 84100 ASSAY OF PHOSPHORUS: CPT | Performed by: EMERGENCY MEDICINE

## 2020-01-02 PROCEDURE — 80307 DRUG TEST PRSMV CHEM ANLYZR: CPT | Performed by: EMERGENCY MEDICINE

## 2020-01-02 PROCEDURE — 25000125 ZZHC RX 250: Performed by: EMERGENCY MEDICINE

## 2020-01-02 PROCEDURE — 12000000 ZZH R&B MED SURG/OB

## 2020-01-02 PROCEDURE — HZ2ZZZZ DETOXIFICATION SERVICES FOR SUBSTANCE ABUSE TREATMENT: ICD-10-PCS | Performed by: EMERGENCY MEDICINE

## 2020-01-02 PROCEDURE — 81001 URINALYSIS AUTO W/SCOPE: CPT | Performed by: EMERGENCY MEDICINE

## 2020-01-02 PROCEDURE — 80076 HEPATIC FUNCTION PANEL: CPT | Performed by: EMERGENCY MEDICINE

## 2020-01-02 PROCEDURE — 80048 BASIC METABOLIC PNL TOTAL CA: CPT | Performed by: EMERGENCY MEDICINE

## 2020-01-02 PROCEDURE — 93005 ELECTROCARDIOGRAM TRACING: CPT

## 2020-01-02 PROCEDURE — 25000128 H RX IP 250 OP 636: Performed by: EMERGENCY MEDICINE

## 2020-01-02 PROCEDURE — 83735 ASSAY OF MAGNESIUM: CPT | Performed by: EMERGENCY MEDICINE

## 2020-01-02 PROCEDURE — 72170 X-RAY EXAM OF PELVIS: CPT

## 2020-01-02 PROCEDURE — 99285 EMERGENCY DEPT VISIT HI MDM: CPT | Mod: 25

## 2020-01-02 PROCEDURE — 96374 THER/PROPH/DIAG INJ IV PUSH: CPT

## 2020-01-02 PROCEDURE — 70450 CT HEAD/BRAIN W/O DYE: CPT

## 2020-01-02 PROCEDURE — 85610 PROTHROMBIN TIME: CPT | Performed by: EMERGENCY MEDICINE

## 2020-01-02 PROCEDURE — 84484 ASSAY OF TROPONIN QUANT: CPT | Performed by: EMERGENCY MEDICINE

## 2020-01-02 PROCEDURE — 99223 1ST HOSP IP/OBS HIGH 75: CPT | Mod: AI | Performed by: INTERNAL MEDICINE

## 2020-01-02 PROCEDURE — 85025 COMPLETE CBC W/AUTO DIFF WBC: CPT | Performed by: EMERGENCY MEDICINE

## 2020-01-02 PROCEDURE — 70496 CT ANGIOGRAPHY HEAD: CPT

## 2020-01-02 RX ORDER — POTASSIUM CHLORIDE 29.8 MG/ML
20 INJECTION INTRAVENOUS
Status: DISCONTINUED | OUTPATIENT
Start: 2020-01-02 | End: 2020-01-03 | Stop reason: CLARIF

## 2020-01-02 RX ORDER — POTASSIUM CHLORIDE 1.5 G/1.58G
20-40 POWDER, FOR SOLUTION ORAL
Status: DISCONTINUED | OUTPATIENT
Start: 2020-01-02 | End: 2020-01-05 | Stop reason: HOSPADM

## 2020-01-02 RX ORDER — IOPAMIDOL 755 MG/ML
500 INJECTION, SOLUTION INTRAVASCULAR ONCE
Status: COMPLETED | OUTPATIENT
Start: 2020-01-02 | End: 2020-01-02

## 2020-01-02 RX ORDER — LANOLIN ALCOHOL/MO/W.PET/CERES
100 CREAM (GRAM) TOPICAL DAILY
Status: DISCONTINUED | OUTPATIENT
Start: 2020-01-03 | End: 2020-01-05 | Stop reason: HOSPADM

## 2020-01-02 RX ORDER — ACETAMINOPHEN 325 MG/1
650 TABLET ORAL EVERY 4 HOURS PRN
Status: DISCONTINUED | OUTPATIENT
Start: 2020-01-02 | End: 2020-01-05 | Stop reason: HOSPADM

## 2020-01-02 RX ORDER — NALOXONE HYDROCHLORIDE 0.4 MG/ML
.1-.4 INJECTION, SOLUTION INTRAMUSCULAR; INTRAVENOUS; SUBCUTANEOUS
Status: DISCONTINUED | OUTPATIENT
Start: 2020-01-02 | End: 2020-01-05 | Stop reason: HOSPADM

## 2020-01-02 RX ORDER — AMOXICILLIN 250 MG
1 CAPSULE ORAL 2 TIMES DAILY PRN
Status: DISCONTINUED | OUTPATIENT
Start: 2020-01-02 | End: 2020-01-05 | Stop reason: HOSPADM

## 2020-01-02 RX ORDER — LIDOCAINE 4 G/G
1 PATCH TOPICAL
Status: DISCONTINUED | OUTPATIENT
Start: 2020-01-03 | End: 2020-01-05 | Stop reason: HOSPADM

## 2020-01-02 RX ORDER — SODIUM CHLORIDE AND POTASSIUM CHLORIDE 150; 900 MG/100ML; MG/100ML
INJECTION, SOLUTION INTRAVENOUS CONTINUOUS
Status: DISCONTINUED | OUTPATIENT
Start: 2020-01-02 | End: 2020-01-04

## 2020-01-02 RX ORDER — GABAPENTIN 300 MG/1
300 CAPSULE ORAL EVERY 8 HOURS
Status: DISCONTINUED | OUTPATIENT
Start: 2020-01-08 | End: 2020-01-05 | Stop reason: HOSPADM

## 2020-01-02 RX ORDER — MAGNESIUM SULFATE HEPTAHYDRATE 40 MG/ML
4 INJECTION, SOLUTION INTRAVENOUS EVERY 4 HOURS PRN
Status: DISCONTINUED | OUTPATIENT
Start: 2020-01-02 | End: 2020-01-05 | Stop reason: HOSPADM

## 2020-01-02 RX ORDER — GABAPENTIN 100 MG/1
100 CAPSULE ORAL EVERY 8 HOURS
Status: DISCONTINUED | OUTPATIENT
Start: 2020-01-10 | End: 2020-01-05 | Stop reason: HOSPADM

## 2020-01-02 RX ORDER — ONDANSETRON 4 MG/1
4 TABLET, ORALLY DISINTEGRATING ORAL EVERY 6 HOURS PRN
Status: DISCONTINUED | OUTPATIENT
Start: 2020-01-02 | End: 2020-01-05 | Stop reason: HOSPADM

## 2020-01-02 RX ORDER — POTASSIUM CHLORIDE 7.45 MG/ML
10 INJECTION INTRAVENOUS
Status: DISCONTINUED | OUTPATIENT
Start: 2020-01-02 | End: 2020-01-05 | Stop reason: HOSPADM

## 2020-01-02 RX ORDER — PROCHLORPERAZINE 25 MG
25 SUPPOSITORY, RECTAL RECTAL EVERY 12 HOURS PRN
Status: DISCONTINUED | OUTPATIENT
Start: 2020-01-02 | End: 2020-01-05 | Stop reason: HOSPADM

## 2020-01-02 RX ORDER — POTASSIUM CL/LIDO/0.9 % NACL 10MEQ/0.1L
10 INTRAVENOUS SOLUTION, PIGGYBACK (ML) INTRAVENOUS
Status: DISCONTINUED | OUTPATIENT
Start: 2020-01-02 | End: 2020-01-05 | Stop reason: HOSPADM

## 2020-01-02 RX ORDER — QUETIAPINE FUMARATE 25 MG/1
25-50 TABLET, FILM COATED ORAL EVERY 6 HOURS PRN
Status: DISCONTINUED | OUTPATIENT
Start: 2020-01-02 | End: 2020-01-05 | Stop reason: HOSPADM

## 2020-01-02 RX ORDER — GABAPENTIN 600 MG/1
1200 TABLET ORAL ONCE
Status: COMPLETED | OUTPATIENT
Start: 2020-01-02 | End: 2020-01-03

## 2020-01-02 RX ORDER — ONDANSETRON 2 MG/ML
4 INJECTION INTRAMUSCULAR; INTRAVENOUS EVERY 6 HOURS PRN
Status: DISCONTINUED | OUTPATIENT
Start: 2020-01-02 | End: 2020-01-05 | Stop reason: HOSPADM

## 2020-01-02 RX ORDER — FOLIC ACID 1 MG/1
1 TABLET ORAL DAILY
Status: DISCONTINUED | OUTPATIENT
Start: 2020-01-03 | End: 2020-01-05 | Stop reason: HOSPADM

## 2020-01-02 RX ORDER — LORAZEPAM 2 MG/ML
1-2 INJECTION INTRAMUSCULAR EVERY 30 MIN PRN
Status: DISCONTINUED | OUTPATIENT
Start: 2020-01-02 | End: 2020-01-05 | Stop reason: HOSPADM

## 2020-01-02 RX ORDER — LORAZEPAM 1 MG/1
1-2 TABLET ORAL EVERY 30 MIN PRN
Status: DISCONTINUED | OUTPATIENT
Start: 2020-01-02 | End: 2020-01-05 | Stop reason: HOSPADM

## 2020-01-02 RX ORDER — IOPAMIDOL 755 MG/ML
70 INJECTION, SOLUTION INTRAVASCULAR ONCE
Status: COMPLETED | OUTPATIENT
Start: 2020-01-02 | End: 2020-01-02

## 2020-01-02 RX ORDER — GABAPENTIN 300 MG/1
900 CAPSULE ORAL EVERY 8 HOURS
Status: DISCONTINUED | OUTPATIENT
Start: 2020-01-03 | End: 2020-01-05 | Stop reason: HOSPADM

## 2020-01-02 RX ORDER — POTASSIUM CHLORIDE 1.5 G/1.58G
20 POWDER, FOR SOLUTION ORAL ONCE
Status: DISCONTINUED | OUTPATIENT
Start: 2020-01-02 | End: 2020-01-02

## 2020-01-02 RX ORDER — METOPROLOL SUCCINATE 50 MG/1
50 TABLET, EXTENDED RELEASE ORAL EVERY EVENING
Status: DISCONTINUED | OUTPATIENT
Start: 2020-01-03 | End: 2020-01-05 | Stop reason: HOSPADM

## 2020-01-02 RX ORDER — POLYETHYLENE GLYCOL 3350 17 G/17G
17 POWDER, FOR SOLUTION ORAL DAILY PRN
Status: DISCONTINUED | OUTPATIENT
Start: 2020-01-02 | End: 2020-01-05 | Stop reason: HOSPADM

## 2020-01-02 RX ORDER — GABAPENTIN 300 MG/1
600 CAPSULE ORAL EVERY 8 HOURS
Status: DISCONTINUED | OUTPATIENT
Start: 2020-01-06 | End: 2020-01-05 | Stop reason: HOSPADM

## 2020-01-02 RX ORDER — PROCHLORPERAZINE MALEATE 10 MG
10 TABLET ORAL EVERY 6 HOURS PRN
Status: DISCONTINUED | OUTPATIENT
Start: 2020-01-02 | End: 2020-01-05 | Stop reason: HOSPADM

## 2020-01-02 RX ORDER — AMOXICILLIN 250 MG
2 CAPSULE ORAL 2 TIMES DAILY PRN
Status: DISCONTINUED | OUTPATIENT
Start: 2020-01-02 | End: 2020-01-05 | Stop reason: HOSPADM

## 2020-01-02 RX ORDER — LIDOCAINE 40 MG/G
CREAM TOPICAL
Status: DISCONTINUED | OUTPATIENT
Start: 2020-01-02 | End: 2020-01-05 | Stop reason: HOSPADM

## 2020-01-02 RX ORDER — CLONIDINE HYDROCHLORIDE 0.1 MG/1
0.1 TABLET ORAL EVERY 8 HOURS
Status: DISCONTINUED | OUTPATIENT
Start: 2020-01-02 | End: 2020-01-05 | Stop reason: HOSPADM

## 2020-01-02 RX ORDER — MULTIPLE VITAMINS W/ MINERALS TAB 9MG-400MCG
1 TAB ORAL DAILY
Status: DISCONTINUED | OUTPATIENT
Start: 2020-01-03 | End: 2020-01-05 | Stop reason: HOSPADM

## 2020-01-02 RX ORDER — LORAZEPAM 2 MG/ML
2 INJECTION INTRAMUSCULAR ONCE
Status: COMPLETED | OUTPATIENT
Start: 2020-01-02 | End: 2020-01-02

## 2020-01-02 RX ORDER — POTASSIUM CHLORIDE 1500 MG/1
20-40 TABLET, EXTENDED RELEASE ORAL
Status: DISCONTINUED | OUTPATIENT
Start: 2020-01-02 | End: 2020-01-05 | Stop reason: HOSPADM

## 2020-01-02 RX ADMIN — LORAZEPAM 1 MG: 2 INJECTION INTRAMUSCULAR; INTRAVENOUS at 22:50

## 2020-01-02 RX ADMIN — SODIUM CHLORIDE 96 ML: 9 INJECTION, SOLUTION INTRAVENOUS at 20:03

## 2020-01-02 RX ADMIN — IOPAMIDOL 70 ML: 755 INJECTION, SOLUTION INTRAVENOUS at 19:49

## 2020-01-02 RX ADMIN — IOPAMIDOL 50 ML: 755 INJECTION, SOLUTION INTRAVENOUS at 20:01

## 2020-01-02 ASSESSMENT — MIFFLIN-ST. JEOR: SCORE: 1436.49

## 2020-01-03 LAB
ALBUMIN SERPL-MCNC: 3.1 G/DL (ref 3.4–5)
ALP SERPL-CCNC: 115 U/L (ref 40–150)
ALT SERPL W P-5'-P-CCNC: 45 U/L (ref 0–70)
ANION GAP SERPL CALCULATED.3IONS-SCNC: 9 MMOL/L (ref 3–14)
AST SERPL W P-5'-P-CCNC: 162 U/L (ref 0–45)
BILIRUB SERPL-MCNC: 0.9 MG/DL (ref 0.2–1.3)
BUN SERPL-MCNC: 5 MG/DL (ref 7–30)
CALCIUM SERPL-MCNC: 8 MG/DL (ref 8.5–10.1)
CHLORIDE SERPL-SCNC: 108 MMOL/L (ref 94–109)
CO2 SERPL-SCNC: 25 MMOL/L (ref 20–32)
CREAT SERPL-MCNC: 0.67 MG/DL (ref 0.66–1.25)
ERYTHROCYTE [DISTWIDTH] IN BLOOD BY AUTOMATED COUNT: 14.1 % (ref 10–15)
GFR SERPL CREATININE-BSD FRML MDRD: >90 ML/MIN/{1.73_M2}
GLUCOSE SERPL-MCNC: 89 MG/DL (ref 70–99)
HCT VFR BLD AUTO: 29.7 % (ref 40–53)
HGB BLD-MCNC: 10 G/DL (ref 13.3–17.7)
INTERPRETATION ECG - MUSE: NORMAL
MAGNESIUM SERPL-MCNC: 1.6 MG/DL (ref 1.6–2.3)
MCH RBC QN AUTO: 34.6 PG (ref 26.5–33)
MCHC RBC AUTO-ENTMCNC: 33.7 G/DL (ref 31.5–36.5)
MCV RBC AUTO: 103 FL (ref 78–100)
PHOSPHATE SERPL-MCNC: 3.1 MG/DL (ref 2.5–4.5)
PHOSPHATE SERPL-MCNC: 3.7 MG/DL (ref 2.5–4.5)
PLATELET # BLD AUTO: 150 10E9/L (ref 150–450)
POTASSIUM SERPL-SCNC: 2.8 MMOL/L (ref 3.4–5.3)
POTASSIUM SERPL-SCNC: 4.6 MMOL/L (ref 3.4–5.3)
PROT SERPL-MCNC: 6.9 G/DL (ref 6.8–8.8)
RBC # BLD AUTO: 2.89 10E12/L (ref 4.4–5.9)
SODIUM SERPL-SCNC: 142 MMOL/L (ref 133–144)
WBC # BLD AUTO: 2.8 10E9/L (ref 4–11)

## 2020-01-03 PROCEDURE — 25000132 ZZH RX MED GY IP 250 OP 250 PS 637: Performed by: INTERNAL MEDICINE

## 2020-01-03 PROCEDURE — 36415 COLL VENOUS BLD VENIPUNCTURE: CPT | Performed by: INTERNAL MEDICINE

## 2020-01-03 PROCEDURE — 84100 ASSAY OF PHOSPHORUS: CPT | Performed by: INTERNAL MEDICINE

## 2020-01-03 PROCEDURE — 25000128 H RX IP 250 OP 636: Performed by: INTERNAL MEDICINE

## 2020-01-03 PROCEDURE — 84132 ASSAY OF SERUM POTASSIUM: CPT | Performed by: INTERNAL MEDICINE

## 2020-01-03 PROCEDURE — 85027 COMPLETE CBC AUTOMATED: CPT | Performed by: INTERNAL MEDICINE

## 2020-01-03 PROCEDURE — 83735 ASSAY OF MAGNESIUM: CPT | Performed by: INTERNAL MEDICINE

## 2020-01-03 PROCEDURE — 12000000 ZZH R&B MED SURG/OB

## 2020-01-03 PROCEDURE — 99232 SBSQ HOSP IP/OBS MODERATE 35: CPT | Performed by: INTERNAL MEDICINE

## 2020-01-03 PROCEDURE — 80053 COMPREHEN METABOLIC PANEL: CPT | Performed by: INTERNAL MEDICINE

## 2020-01-03 RX ORDER — LISINOPRIL 20 MG/1
20 TABLET ORAL DAILY
Status: DISCONTINUED | OUTPATIENT
Start: 2020-01-03 | End: 2020-01-05 | Stop reason: HOSPADM

## 2020-01-03 RX ADMIN — Medication 1 LOZENGE: at 23:54

## 2020-01-03 RX ADMIN — FOLIC ACID 1 MG: 1 TABLET ORAL at 08:33

## 2020-01-03 RX ADMIN — GABAPENTIN 1200 MG: 600 TABLET, FILM COATED ORAL at 00:30

## 2020-01-03 RX ADMIN — CLONIDINE HYDROCHLORIDE 0.1 MG: 0.1 TABLET ORAL at 23:54

## 2020-01-03 RX ADMIN — CLONIDINE HYDROCHLORIDE 0.1 MG: 0.1 TABLET ORAL at 00:26

## 2020-01-03 RX ADMIN — ACETAMINOPHEN 650 MG: 325 TABLET, FILM COATED ORAL at 00:25

## 2020-01-03 RX ADMIN — GABAPENTIN 900 MG: 300 CAPSULE ORAL at 08:16

## 2020-01-03 RX ADMIN — Medication 1 LOZENGE: at 18:19

## 2020-01-03 RX ADMIN — GABAPENTIN 900 MG: 300 CAPSULE ORAL at 23:54

## 2020-01-03 RX ADMIN — POTASSIUM CHLORIDE 40 MEQ: 1500 TABLET, EXTENDED RELEASE ORAL at 10:24

## 2020-01-03 RX ADMIN — POTASSIUM CHLORIDE AND SODIUM CHLORIDE: 900; 150 INJECTION, SOLUTION INTRAVENOUS at 10:26

## 2020-01-03 RX ADMIN — LORAZEPAM 1 MG: 2 INJECTION INTRAMUSCULAR; INTRAVENOUS at 00:34

## 2020-01-03 RX ADMIN — POTASSIUM CHLORIDE AND SODIUM CHLORIDE: 900; 150 INJECTION, SOLUTION INTRAVENOUS at 00:43

## 2020-01-03 RX ADMIN — LORAZEPAM 1 MG: 1 TABLET ORAL at 10:24

## 2020-01-03 RX ADMIN — GABAPENTIN 900 MG: 300 CAPSULE ORAL at 16:25

## 2020-01-03 RX ADMIN — METOPROLOL SUCCINATE 50 MG: 50 TABLET, EXTENDED RELEASE ORAL at 00:25

## 2020-01-03 RX ADMIN — CLONIDINE HYDROCHLORIDE 0.1 MG: 0.1 TABLET ORAL at 16:24

## 2020-01-03 RX ADMIN — LISINOPRIL 20 MG: 20 TABLET ORAL at 16:25

## 2020-01-03 RX ADMIN — POTASSIUM CHLORIDE AND SODIUM CHLORIDE: 900; 150 INJECTION, SOLUTION INTRAVENOUS at 19:21

## 2020-01-03 RX ADMIN — LIDOCAINE 1 PATCH: 560 PATCH PERCUTANEOUS; TOPICAL; TRANSDERMAL at 19:42

## 2020-01-03 RX ADMIN — POTASSIUM CHLORIDE 40 MEQ: 1500 TABLET, EXTENDED RELEASE ORAL at 08:31

## 2020-01-03 RX ADMIN — LORAZEPAM 1 MG: 2 INJECTION INTRAMUSCULAR; INTRAVENOUS at 02:07

## 2020-01-03 RX ADMIN — CLONIDINE HYDROCHLORIDE 0.1 MG: 0.1 TABLET ORAL at 08:16

## 2020-01-03 RX ADMIN — Medication 1 MG: at 00:25

## 2020-01-03 RX ADMIN — ONDANSETRON HYDROCHLORIDE 4 MG: 2 INJECTION, SOLUTION INTRAMUSCULAR; INTRAVENOUS at 08:51

## 2020-01-03 RX ADMIN — Medication 1 MG: at 21:21

## 2020-01-03 RX ADMIN — MULTIPLE VITAMINS W/ MINERALS TAB 1 TABLET: TAB at 08:33

## 2020-01-03 RX ADMIN — LIDOCAINE 1 PATCH: 560 PATCH PERCUTANEOUS; TOPICAL; TRANSDERMAL at 00:26

## 2020-01-03 RX ADMIN — Medication 100 MG: at 08:33

## 2020-01-03 ASSESSMENT — ACTIVITIES OF DAILY LIVING (ADL)
ADLS_ACUITY_SCORE: 13
ADLS_ACUITY_SCORE: 16
TOILETING: 0-->INDEPENDENT
RETIRED_COMMUNICATION: 0-->UNDERSTANDS/COMMUNICATES WITHOUT DIFFICULTY
ADLS_ACUITY_SCORE: 14
ADLS_ACUITY_SCORE: 16
RETIRED_EATING: 0-->INDEPENDENT
ADLS_ACUITY_SCORE: 15
TRANSFERRING: 0-->INDEPENDENT
AMBULATION: 0-->INDEPENDENT
WHICH_OF_THE_ABOVE_FUNCTIONAL_RISKS_HAD_A_RECENT_ONSET_OR_CHANGE?: AMBULATION;TRANSFERRING
DRESS: 0-->INDEPENDENT
COGNITION: 0 - NO COGNITION ISSUES REPORTED
SWALLOWING: 0-->SWALLOWS FOODS/LIQUIDS WITHOUT DIFFICULTY
BATHING: 0-->INDEPENDENT
FALL_HISTORY_WITHIN_LAST_SIX_MONTHS: YES
ADLS_ACUITY_SCORE: 14

## 2020-01-03 NOTE — ED TRIAGE NOTES
Patient presents to ED due fall, altered mental status. Per wife report patient was found on ground slow to respond. Wife concerned patient has had fallen 3-4 times yesterday     Strong smell of alcohol on breath

## 2020-01-03 NOTE — PLAN OF CARE
A/Ox4, Ax1 with GB, VSS, LS clear on RA, R PIV infusing NS +20KCL at 100ml/hr, reports L lower back pain - lidocaine patch applied with some relief, CIWA scores 7-8 - 2mg IV ativan administered this shift for significant tremors and mild anxiety, chem dep consult when able, continue with plan of care.

## 2020-01-03 NOTE — ED NOTES
[facility], [place]  ED Nurse Handoff Report    Chalo Freire is a 44 year old male   ED Chief complaint: No chief complaint on file.  . ED Diagnosis:   Final diagnoses:   None     Allergies:   Allergies   Allergen Reactions     Hydrocodone        Code Status: Full Code  Activity level - Baseline/Home:  Independent. Activity Level - Current:   Assist X 1. Lift room needed: No. Bariatric: No   Needed: No   Isolation: No. Infection: Not Applicable.     Vital Signs:   Vitals:    01/02/20 2008 01/02/20 2015 01/02/20 2034 01/02/20 2145   BP:  (!) 118/96  (!) 133/99   Pulse:  93  98   Resp:  23  15   Temp:   98.3  F (36.8  C)    TempSrc:   Oral    SpO2: 100% 100%  100%       Cardiac Rhythm:  ,      Pain level:    Patient confused: No. Patient Falls Risk: Yes.   Elimination Status: Has voided   Patient Report - Initial Complaint: Fall. Focused Assessment: Rib Pain, Slurred speech, tremors, generalized weakness   Tests Performed:   Labs Ordered and Resulted from Time of ED Arrival Up to the Time of Departure from the ED   BASIC METABOLIC PANEL - Abnormal; Notable for the following components:       Result Value    Potassium 3.2 (*)     Glucose 110 (*)     Urea Nitrogen 5 (*)     All other components within normal limits   CBC WITH PLATELETS DIFFERENTIAL - Abnormal; Notable for the following components:    RBC Count 3.50 (*)     Hemoglobin 12.1 (*)     Hematocrit 35.3 (*)      (*)     MCH 34.6 (*)     Absolute Neutrophil 1.0 (*)     All other components within normal limits   GLUCOSE BY METER - Abnormal; Notable for the following components:    Glucose 108 (*)     All other components within normal limits   ALCOHOL ETHYL - Abnormal; Notable for the following components:    Ethanol g/dL 0.39 (*)     All other components within normal limits   HEPATIC PANEL - Abnormal; Notable for the following components:    Bilirubin Direct 0.4 (*)      (*)     All other components within normal limits    GLUCOSE MONITOR NURSING POCT   INR   PARTIAL THROMBOPLASTIN TIME   TROPONIN I   MAGNESIUM   DRUG ABUSE SCREEN 77 URINE (FL, RH, SH)   ROUTINE UA WITH MICROSCOPIC   PULSE OXIMETRY NURSING   ISTAT CREATININE NURSING POCT   ISTAT TROPONIN NURSING POCT   VITAL SIGNS AND NEURO CHECKS   ACTIVITY   ASSESSMENT   NOTIFY     XR Chest 1 View   Final Result   IMPRESSION: Negative chest.      XR Pelvis 1/2 Views   Final Result   IMPRESSION: Normal joint spaces and alignment. No fracture. Excreted contrast noted in the ureters and bladder.      CT Head Perfusion w Contrast   Final Result   IMPRESSION:       CT PERFUSION:   1.  Relatively symmetric cerebral perfusion throughout brain parenchyma.      CTA Head Neck with Contrast   Final Result   IMPRESSION:    HEAD CT:   1.  No discrete mass lesion, hemorrhage or focal area suggestive of acute infarct.   2.  Diffuse volume loss for age which has progressed since previous study.      HEAD CTA:    1.  No discrete aneurysm, vascular malformation or intracranial stenosis involving the arteries of the [place].      NECK CTA:   1.  Normal configuration of the great vessels off aortic arch with no significant stenosis of their origins.   2.  No significant stenosis or irregularity involving arteries of neck by NASCET criteria.   3.  No radiographic evidence of dissection.      Head CT findings were communicated to Dr. Mariscal at 7:56 PM. CTA findings were communicated to Dr. Mariscal at 8:12 PM on 01/02/2020.      CT Head w/o Contrast   Final Result   IMPRESSION:    HEAD CT:   1.  No discrete mass lesion, hemorrhage or focal area suggestive of acute infarct.   2.  Diffuse volume loss for age which has progressed since previous study.      HEAD CTA:    1.  No discrete aneurysm, vascular malformation or intracranial stenosis involving the arteries of the [place].      NECK CTA:   1.  Normal configuration of the great vessels off aortic arch with no significant stenosis of  their origins.   2.  No significant stenosis or irregularity involving arteries of neck by NASCET criteria.   3.  No radiographic evidence of dissection.      Head CT findings were communicated to Dr. Mariscal at 7:56 PM. CTA findings were communicated to Dr. Mariscal at 8:12 PM on 01/02/2020.        Abnormal Results: Ethanol g/dL   0.39 g/dL  [Ref Range: <0.01] (Critical Value called to and read back by   Treatments provided: See MAR  Family Comments: Family was at bedside, but went home  OBS brochure/video discussed/provided to patient:  N/A  ED Medications:   Medications   0.9% sodium chloride BOLUS (has no administration in time range)   potassium chloride (KLOR-CON) Packet 20 mEq (has no administration in time range)   CT SCAN FLUSH (96 mLs Intravenous Given 1/2/20 2003)   iopamidol (ISOVUE-370) solution 500 mL (50 mLs Intravenous Given 1/2/20 2001)   iopamidol (ISOVUE-370) solution 70 mL (70 mLs Intravenous Given 1/2/20 1949)   sodium chloride (PF) 0.9% PF flush 80 mL (80 mLs Intravenous Given 1/2/20 1950)     Drips infusing:  No  For the majority of the shift, the patient's behavior Green. Interventions performed were Monitor.     Severe Sepsis OR Septic Shock Diagnosis Present: No      ED Nurse Name/Phone Number: Boogie Pineda RN,   9:55 PM    RECEIVING UNIT ED HANDOFF REVIEW    Above ED Nurse Handoff Report was reviewed: Yes  Reviewed by: Susie Moscoso RN on January 2, 2020 at 11:24 PM

## 2020-01-03 NOTE — H&P
[facility]  Hospitalist Admission Note  Name: Chalo Freire    MRN: [id number]  YOB: 1975    Age: 44 year old  Date of admission: 1/2/2020  Primary care provider: Allen Flanagan    Chief Complaint:  Fall, alcohol intoxication    Assessment and Plan:     Chalo Freire is a 44 year old male with PMH including HTN, alcohol dependence and alcohol abuse who has had severe withdrawal requiring ICU stay who was admitted on 1/2/2020 after a fall and was found to have acute alcohol intoxication.    1.  Acute alcohol intoxication with history of alcohol abuse and dependence with high risk for severe alcohol withdrawal: Patient has history of alcohol dependence and abuse with severe alcohol withdrawal in 7/2019 requiring Precedex drip in ICU.  Currently he is intoxicated with a blood alcohol level of 0.39.  He stated that he had been sober but started drinking on New Year's.  He is high risk for alcohol withdrawal.  At this point no withdrawal but I suspect that it is just early to see this.  -CIWA protocol (scheduled gabapentin as well as symptom triggered Ativan)  -Once appropriate CD consult    2.  Fall with left mid/lower back and chest wall pain: Patient cannot give specific details of his fall but he has pain over the left mid/lower back and chest wall.  Chest x-ray was negative.  Will have Lidoderm patch for pain.    3.  Hypokalemia: Likely due to significant alcohol use.  Replace per protocol.  Also check magnesium and phosphorus and replace if needed.    4.  Transaminitis: Elevation of AST and bilirubin.  This is likely due to alcohol use.  Follow.    5.  Hypertension: Prior to admission on metoprolol and lisinopril.  Blood pressure currently well controlled.  Continue metoprolol with hold parameters but will hold lisinopril for now.    Diet: NPO for Medical/Clinical Reasons Except for: No Exceptions    DVT Prophylaxis: Pneumatic Compression Devices  [place] Catheter: not present  Code  Status: Full Code    Disposition Plan   Expected discharge: Admit to inpatient status, recommended to prior living arrangement once safe disposition plan/ TCU bed available and alcohol withdrawal resolved.  Entered: Shante Jeffery MD 01/02/2020, 10:33 PM     The patient's care was discussed with the Patient.    Shante Jeffery MD  [facility]        Shante Jeffery MD  [facility]      History of Present Illness:  Chalo Freire is a 44 year old male with PMH including HTN, alcohol dependence and alcohol abuse who has had severe withdrawal requiring ICU stay who was admitted on 1/2/2020 after a fall and was found to have acute alcohol intoxication.  History was obtained through patient interview, chart review and discussion with Dr. Mariscal in the ER.    The patient tells me that he is here because he fell and broke his ribs.  He states he cannot remember what happened.  He then proceeds to tell me that he was outside and slipped and fell.  He states that the pain continued and yesterday he came home and open the door and fell.  He has several different stories of how his pain started.  He states that his pain is on the left side in the posterior lower ribs and mid/low back.  He denies chest pain or shortness of breath.  No cough.  He states he did have emesis last night.    He reports that he went to treatment last summer for 1 month.  He also states that he started drinking again because of new years.  He states he just had 1 drink.  He is saddened that he started drinking again.  He states that he had some abdominal pain when he drank.    Initially when patient came to the emergency room there was concern that he could have a stroke.  His wife was present initially and states that he called her while she was at work and when she arrived 20 minutes later he was on the floor.  He did report some weakness in his legs.  He had imaging which was negative for stroke.     Past Medical  History:  Past Medical History:   Diagnosis Date     Falls      History of low potassium      Hypertension      Past Surgical History:  No surgeries    Social History:  Social History     Tobacco Use     Smoking status: Never Smoker     Smokeless tobacco: Never Used   Substance Use Topics     Alcohol use: Yes     Social History     Social History Narrative     Not on file     Family History:  Reviewed and non contributory to the case    Allergies:  Allergies   Allergen Reactions     Hydrocodone      Medications:  Current Facility-Administered Medications   Medication     0.9% sodium chloride BOLUS     potassium chloride (KLOR-CON) Packet 20 mEq     Current Outpatient Medications   Medication     folic acid (FOLVITE) 1 MG tablet     lisinopril (PRINIVIL,ZESTRIL) 20 MG tablet     metoprolol succinate ER (TOPROL-XL) 50 MG 24 hr tablet     potassium chloride (KLOR-CON) 20 MEQ packet     traZODone (DESYREL) 50 MG tablet      Review of Systems:  A Comprehensive greater than 10 system review of systems was carried out.  Pertinent positives and negatives are noted above.  Otherwise negative for contributory information.     Physical Exam:  Blood pressure (!) 133/99, pulse 98, temperature 98.3  F (36.8  C), temperature source Oral, resp. rate 15, SpO2 100 %.  Wt Readings from Last 1 Encounters:   07/26/19 61.8 kg (136 lb 4.8 oz)     Exam:   General: Alert, awake, no acute distress but does appear visibly intoxicated.  HEENT: NC/AT, eyes anicteric and without injection, EOMI, face symmetric.  Dentition WNL, MMM.  Cardiac: RRR, normal S1, S2.  No murmurs/g/r.  No LE edema  Pulmonary: Normal chest rise, normal work of breathing.  Lungs CTAB without crackles or wheezing  Abdomen: soft, non-tender, non-distended.  Normoactive BS.  No guarding or rebound tenderness.  Extremities: no deformities.  Warm, well perfused.  Tenderness to palpation over left posterior mid/lower back which wraps around the left lower rib cage  Skin: no  rashes or lesions noted.  Warm and Dry.  Neuro: No focal deficits noted.  Speech clear.  Coordination and strength grossly normal.  Psych: Appropriate affect.  Difficulty providing accurate history.    Data Reviewed Today:  Imaging:  Results for orders placed or performed during the hospital encounter of 01/02/20   CT Head Perfusion w Contrast    Narrative    EXAM: CT HEAD PERFUSION WITH CONTRAST  LOCATION: [facility]  DATE/TIME: 1/2/2020 7:47 PM    INDICATION: Leg weakness and confusion.  COMPARISON: 01/20/2020.  TECHNIQUE: CT cerebral perfusion was performed utilizing a second contrast bolus. Perfusion data were post processed with generation of standard perfusion maps and estimation of ischemic/infarcted volumes utilizing standard threshold values. Dose   reduction techniques were used.    CONTRAST: 50mL Isovue-370      FINDINGS:   CT PERFUSION:  PERFUSION MAPS: Symmetrical cerebral perfusion. No focal deficits in cerebral blood flow or volume to suggest ischemia/oligemia.    RAPID ANALYSIS:  CBF<30%: 0 mL.  Tmax>6sec: 0 mL.  Mismatch volume: 0 mL.  Mismatch ratio: None      Impression    IMPRESSION:     CT PERFUSION:  1.  Relatively symmetric cerebral perfusion throughout brain parenchyma.   CT Head w/o Contrast    Narrative    EXAM: CT HEAD W/O CONTRAST, CTA  HEAD NECK WITH CONTRAST  LOCATION: [facility]  DATE/TIME: 1/2/2020 7:35 PM    INDICATION: Leg weakness and confusion.  COMPARISON: 04/25/2016.  CONTRAST: 70mL Isovue-370  TECHNIQUE: Head and neck CT angiogram with IV contrast. Noncontrast head CT followed by axial helical CT images of the head and neck vessels obtained during the arterial phase of intravenous contrast administration. Axial 2D reconstructed images and   multiplanar 3D MIP reconstructed images of the head and neck vessels were performed by the technologist. Dose reduction techniques were used. All stenosis measurements made according to NASCET criteria unless  otherwise specified.    FINDINGS:   NONCONTRAST HEAD CT:   INTRACRANIAL CONTENTS: No intracranial hemorrhage, extraaxial collection, or mass effect.  No CT evidence of acute infarct. Normal parenchymal attenuation. The ventricular system, basal cisterns and the cortical sulci are consistent with diffuse volume   loss for age including the cerebellum. This has progressed since the previous study.     VISUALIZED ORBITS/SINUSES/MASTOIDS: No intraorbital abnormality. No paranasal sinus mucosal disease. No middle ear or mastoid effusion.    BONES/SOFT TISSUES: No acute abnormality.    HEAD CTA:  ANTERIOR CIRCULATION: No stenosis/occlusion, aneurysm, or high flow vascular malformation. There is a patent anterior communicating artery with a hypoplastic A1 segment of the right anterior cerebral artery. The left anterior cerebral artery is dominant.    POSTERIOR CIRCULATION: No stenosis/occlusion, aneurysm, or high flow vascular malformation. Balanced vertebral arteries supply a normal basilar artery.     DURAL VENOUS SINUSES: Expected enhancement of the major dural venous sinuses.    NECK CTA:  RIGHT CAROTID: No measurable stenosis or dissection.    LEFT CAROTID: No measurable stenosis or dissection.    VERTEBRAL ARTERIES: No focal stenosis or dissection. Balanced vertebral arteries.    AORTIC ARCH: Classic aortic arch anatomy with no significant stenosis at the origin of the great vessels.    NONVASCULAR STRUCTURES: The lung apices are clear. The paraspinal soft tissues are unremarkable. The cervical spine is within normal limits.      Impression    IMPRESSION:   HEAD CT:  1.  No discrete mass lesion, hemorrhage or focal area suggestive of acute infarct.  2.  Diffuse volume loss for age which has progressed since previous study.    HEAD CTA:   1.  No discrete aneurysm, vascular malformation or intracranial stenosis involving the arteries of the [place].    NECK CTA:  1.  Normal configuration of the great vessels  off aortic arch with no significant stenosis of their origins.  2.  No significant stenosis or irregularity involving arteries of neck by NASCET criteria.  3.  No radiographic evidence of dissection.    Head CT findings were communicated to Dr. Mariscal at 7:56 PM. CTA findings were communicated to Dr. Mariscal at 8:12 PM on 01/02/2020.   CTA Head Neck with Contrast    Narrative    EXAM: CT HEAD W/O CONTRAST, CTA  HEAD NECK WITH CONTRAST  LOCATION: [facility]  DATE/TIME: 1/2/2020 7:35 PM    INDICATION: Leg weakness and confusion.  COMPARISON: 04/25/2016.  CONTRAST: 70mL Isovue-370  TECHNIQUE: Head and neck CT angiogram with IV contrast. Noncontrast head CT followed by axial helical CT images of the head and neck vessels obtained during the arterial phase of intravenous contrast administration. Axial 2D reconstructed images and   multiplanar 3D MIP reconstructed images of the head and neck vessels were performed by the technologist. Dose reduction techniques were used. All stenosis measurements made according to NASCET criteria unless otherwise specified.    FINDINGS:   NONCONTRAST HEAD CT:   INTRACRANIAL CONTENTS: No intracranial hemorrhage, extraaxial collection, or mass effect.  No CT evidence of acute infarct. Normal parenchymal attenuation. The ventricular system, basal cisterns and the cortical sulci are consistent with diffuse volume   loss for age including the cerebellum. This has progressed since the previous study.     VISUALIZED ORBITS/SINUSES/MASTOIDS: No intraorbital abnormality. No paranasal sinus mucosal disease. No middle ear or mastoid effusion.    BONES/SOFT TISSUES: No acute abnormality.    HEAD CTA:  ANTERIOR CIRCULATION: No stenosis/occlusion, aneurysm, or high flow vascular malformation. There is a patent anterior communicating artery with a hypoplastic A1 segment of the right anterior cerebral artery. The left anterior cerebral artery is dominant.    POSTERIOR CIRCULATION: No  stenosis/occlusion, aneurysm, or high flow vascular malformation. Balanced vertebral arteries supply a normal basilar artery.     DURAL VENOUS SINUSES: Expected enhancement of the major dural venous sinuses.    NECK CTA:  RIGHT CAROTID: No measurable stenosis or dissection.    LEFT CAROTID: No measurable stenosis or dissection.    VERTEBRAL ARTERIES: No focal stenosis or dissection. Balanced vertebral arteries.    AORTIC ARCH: Classic aortic arch anatomy with no significant stenosis at the origin of the great vessels.    NONVASCULAR STRUCTURES: The lung apices are clear. The paraspinal soft tissues are unremarkable. The cervical spine is within normal limits.      Impression    IMPRESSION:   HEAD CT:  1.  No discrete mass lesion, hemorrhage or focal area suggestive of acute infarct.  2.  Diffuse volume loss for age which has progressed since previous study.    HEAD CTA:   1.  No discrete aneurysm, vascular malformation or intracranial stenosis involving the arteries of the [place].    NECK CTA:  1.  Normal configuration of the great vessels off aortic arch with no significant stenosis of their origins.  2.  No significant stenosis or irregularity involving arteries of neck by NASCET criteria.  3.  No radiographic evidence of dissection.    Head CT findings were communicated to Dr. Mariscal at 7:56 PM. CTA findings were communicated to Dr. Mariscal at 8:12 PM on 01/02/2020.   XR Pelvis 1/2 Views    Narrative    EXAM: XR PELVIS 1/2 VW  LOCATION: [facility]  DATE/TIME: 1/2/2020 8:48 PM    INDICATION: Fall, pain      Impression    IMPRESSION: Normal joint spaces and alignment. No fracture. Excreted contrast noted in the ureters and bladder.   XR Chest 1 View    Narrative    EXAM: XR CHEST 1 VW  LOCATION: [facility]  DATE/TIME: 1/2/2020 8:49 PM    INDICATION: Fall, weakness, history of rib fractures      Impression    IMPRESSION: Negative chest.     Labs:  Recent Labs   Lab 01/02/20 1935    WBC 4.2   HGB 12.1*   HCT 35.3*   *        Recent Labs   Lab 01/02/20  1935      POTASSIUM 3.2*   CHLORIDE 107   CO2 22   ANIONGAP 12   *   BUN 5*   CR 0.81   GFRESTIMATED >90   GFRESTBLACK >90   BELIA 8.5   MAG 1.8   PROTTOTAL 8.5   ALBUMIN 4.0   BILITOTAL 0.9   ALKPHOS 146   *   ALT 60       Shante Jeffery MD  Hospitalist  [facility]

## 2020-01-03 NOTE — PLAN OF CARE
Pt up to bathroom with gait belt and 1 assist. Very tremulous when up. Did state he was unable to sleep overnight and due to tremulous gait and emesis x 1 (CIWA 9) was given 1mg ativan po and tolerated well. Able to tolerate clear juice and water this afternoon. Diarrhea x 1 and formed stool x 1. Alert and oriented but flat. IV continues. K 2.8 and replaced today with oral pill with redraw due 1430. Using call light this shift. Will monitor.

## 2020-01-03 NOTE — PHARMACY-ADMISSION MEDICATION HISTORY
Admission medication history interview status for this patient is complete. See [facility] admission navigator for allergy information, prior to admission medications and immunization status.     Medication history interview source(s):wife  Medication history resources (including written lists, pill bottles, clinic record):None    Changes made to PTA medication list:  Added: none  Deleted: MVI, protonix, vitamin B1  Changed: none    Actions taken by pharmacist (provider contacted, etc):None     Additional medication history information:None    Medication reconciliation/reorder completed by provider prior to medication history? No    For patients on insulin therapy: no (Yes/No)   Lantus/levemir/NPH/Mix 70/30 dose: ___ in AM/PM or twice daily   Sliding scale Novolog Y/N   If Yes, do you have a baseline novolog pre-meal dose: ______units with meals   Patients eat three meals a day: Y/N ---  How many episodes of hypoglycemia (low blood glucose) do you have weekly: ---   How many missed doses do you have a week: ---  How many times do you check your blood glucose per day: ---  Any Barriers to therapy: cost of medications/comfortable with giving injections (if applicable)/ comfortable and confident with current diabetes regimen ---      Prior to Admission medications    Medication Sig Last Dose Taking? Auth Provider   folic acid (FOLVITE) 1 MG tablet Take 1 tablet (1 mg) by mouth daily 1/1/2020 at Unknown time Yes nAnalise Mancia MD   lisinopril (PRINIVIL,ZESTRIL) 20 MG tablet Take 1 tablet (20 mg) by mouth daily 1/1/2020 at Unknown time Yes Mckinley Golden MD   metoprolol succinate ER (TOPROL-XL) 50 MG 24 hr tablet Take 1 tablet (50 mg) by mouth every evening Past Week at Unknown time Yes Annalise Mancia MD   potassium chloride (KLOR-CON) 20 MEQ packet Take 20 mEq by mouth 2 times daily Past Week at Unknown time Yes Reported, Patient   traZODone (DESYREL) 50 MG tablet Take 1 tablet (50 mg) by mouth At Bedtime  Past Week at Unknown time Yes Annalise Mancia MD

## 2020-01-03 NOTE — ED PROVIDER NOTES
History     Chief Complaint:  Fall      The history is provided by the patient and the spouse.      Chalo Freire is a 44 year old male with a history of hypertensino, alcohol withdrawals, and falls, who presents for evaluation of fall. The patient states that he fell today and is not sure why. His wife states that he called her while she was at work and that when she arrived 20 minutes later she found him on the floor. The patient reports some weakness in his legs. His wife notes that he drinks frequently and does not eat often. She states that she is unsure when he drank last. She denies any drug use.      Allergies:  Hydrocodone      Medications:    Prinvil  Toprol  Klor-con  Desyrel     Past Medical History:    Falls  HTN  Alcohol withdrawal     Past Surgical History:    History reviewed. No pertinent surgical history.      Family History:    History reviewed. No pertinent family history.       Social History:  Smoking status: never   Alcohol use: yes   Drug use: no   PCP: Physician No Ref-Primary  Marital Status:            Review of Systems   Neurological: Positive for weakness.   All other systems reviewed and are negative.      Physical Exam     Patient Vitals for the past 24 hrs:   BP Temp Temp src Pulse Heart Rate Resp SpO2   01/02/20 2326 -- -- -- -- -- 16 --   01/02/20 2300 107/86 -- -- 102 -- -- 99 %   01/02/20 2230 118/89 -- -- 95 -- -- 99 %   01/02/20 2200 (!) 129/95 -- -- 103 -- -- 99 %   01/02/20 2145 (!) 133/99 -- -- 98 101 15 100 %   01/02/20 2130 (!) 124/92 -- -- 82 92 -- 99 %   01/02/20 2115 (!) 117/90 -- -- 89 93 24 100 %   01/02/20 2106 (!) 121/92 -- -- 86 91 28 --   01/02/20 2034 -- 98.3  F (36.8  C) Oral -- -- -- --   01/02/20 2015 (!) 118/96 -- -- 93 99 23 100 %   01/02/20 2008 -- -- -- -- -- -- 100 %   01/02/20 2007 (!) 129/99 -- -- 107 -- -- --       Physical Exam  General: The patient is alert, in no respiratory distress.    HENT: Mucous membranes moist.    Cardiovascular:  Regular rate and rhythm. Good pulses in all four extremities. Normal capillary refill and skin turgor.     Respiratory: Lungs are clear. No nasal flaring. No retractions. No wheezing, no crackles.    Gastrointestinal: Abdomen soft. No guarding, no rebound. No palpable hernias.     Musculoskeletal: No gross deformity.     Skin: No rashes or petechiae.     Neurologic: The patient is alert and oriented x3. GCS 15. No testable cranial nerve deficit. Follows commands. Slightly slurred speech. Gives appropriate answers. Weakness in lower extremities, able to raise off bed. No gross neurologic deficit. Gross sensation intact. Pupils are round and reactive. No meningismus.     Lymphatic: No cervical adenopathy. No lower extremity swelling.    Psychiatric: The patient is non-tearful.      Emergency Department Course     ECG:  ECG taken at 2010, ECG read at 2010  Normal sinus rhythm  normal ECG  Rate 100 bpm. ME interval 154 ms. QRS duration 82 ms. QT/QTc 360/464 ms. P-R-T axes 36 17 37.    Imaging:  Radiology findings were communicated with the patient who voiced understanding of the findings.    XR chest:  Negative chest  Reading per radiology    XR Pelvis:  Normal joint spaces and alignment. No fracture. Excreted contrast noted in the ureters and bladder.  Reading per radiology    CT Head perfusion:  1.  Relatively symmetric cerebral perfusion throughout brain parenchyma.  Reading per radiology    CTA Head neck with contrast:  Head CT  1.  No discrete mass lesion, hemorrhage or focal area suggestive of acute infarct.  2.  Diffuse volume loss for age which has progressed since previous study.    HEAD CTA:   1.  No discrete aneurysm, vascular malformation or intracranial stenosis involving the arteries of the [place].    NECK CTA:  1.  Normal configuration of the great vessels off aortic arch with no significant stenosis of their origins.  2.  No significant stenosis or irregularity involving arteries of neck by  NASCET criteria.  3.  No radiographic evidence of dissection.  Reading per radiology    Laboratory:  Laboratory findings were communicated with the patient who voiced understanding of the findings.    UA with micro: ketones 10 (A) protein albumin 20 (A)  Mucus present (A) o/w negative    Drug abuse screen 77 urine: negative    CBC: WBC 4.2, HGB 12.1(L),   BMP: K 3.2 (L) glucose 110 (H) BUN 5 (L) o/w WNL (Creatinine 0.81)    INR: 1.03    PTT: 31    Troponin 1935: <0.015    Alcohol ethyl: 0.39 (HH)    Magnesium: 1.8    Hepatic panel: bilirubin direct 0.4 (H)  (H) o/w WNL    Glucose by meter: 108 (H)     Interventions:  2250 ativan 1 mg IV     Emergency Department Course:                Nursing notes and vitals reviewed.     1926    I performed an exam of the patient as documented above.      1928    Code stroke called.      1931    I spoke with Dr. Anaya of Stroke neurology regarding patient's presentation, findings, and plan of care.     1935 IV was inserted and blood was drawn for laboratory testing, results above.    1935 The patient was sent for a CT while in the emergency department, results above.     1947 The patient was sent for a CT while in the emergency department, results above.     1956 I spoke with Dr. Marshall of radiology regarding patient's presentation, findings, and plan of care.      2000  I spoke with Dr. Anaya of Stroke neurology regarding patient's presentation, findings, and plan of care.     2033 I returned to check on patient. I discussed the results of the imaging studies with the patient.     2048 The patient was sent for a XR while in the emergency department, results above.     2131 I returned to check on patient. I personally reviewed the laboratory and imaging results with the patient and answered all related questions prior to admit.     2144 I returned to check on patient. The patient states that he does not want to stay in the hospital.     2219  I spoke with Dr. Jeffery of  the Hospitalist service from [facility] regarding patient's presentation, findings, and plan of care.      Impression & Plan      Medical Decision Making:  Chalo Freire is a 44 year old male who presents to the emergency department today for evaluation of multiple falls and weakness.  The patient's wife was the main historian but the patient was able to provide some history as well as I gleaned information from reviewing the patient's chart.  The patient has a history of heavy alcohol abuse as well as withdrawal.  The patient's wife does not know when he last drank but thinks it has been today.  He had called her and reported a fall she is on that he is unsure of exactly what happened however.  The patient was still on the ground when the patients wife showed up approximately 20 minutes later.  The patient did report weakness but was able to move all extremities I was however concerned about things such as a bleed and did call him a code stroke.  I discussed with stroke neurology who felt after his CT and perfusion imaging that he was not a TPA candidate and unlikely a stroke.  The patient's alcohol level did come back quite high which is likely the cause of his symptoms.  At this point I am concerned about him withdrawing and the patient was admitted to inpatient status and will need treatment for withdrawal but is not currently having severe signs but did have some tremors was treated with Ativan.  The patient was admitted in good condition with no signs of a stroke or fracture from the fall.    Diagnosis:    ICD-10-CM    1. Falls frequently R29.6    2. Alcoholic intoxication without complication (H) F10.920      Disposition:   Findings and plan explained to the Patient and spouse who consents to admission. Discussed the patient with Dr. Jeffery, who will admit the patient to a medical bed for further monitoring, evaluation, and treatment.    Scribe Disclosure:  I, Annabella aLndry, am serving as a scribe at  7:58 PM on 1/2/2020 to document services personally performed by Suraj Mariscal MD based on my observations and the provider's statements to me.  [facility] EMERGENCY DEPARTMENT       Suraj Mariscal MD  01/03/20 0045

## 2020-01-03 NOTE — PROGRESS NOTES
[facility]    Medicine Progress Note - Hospitalist Service       Date of Admission:  1/2/2020  Length of stay: 1 days    Assessment & Plan   Chalo Freire is a 44-year-old male with a history of hypertension, alcohol dependence, alcohol abuse, alcohol withdrawal who is admitted to the hospitalist service with alcohol intoxication and high risk for alcohol withdrawal.    Today:  - Continue CIWA scale with as needed Ativan as well as scheduled gabapentin.  Got 3 mg of Ativan overnight.    Alcohol intoxication, alcohol abuse, alcohol dependence, and alcohol withdrawal  - Patient has a history of alcohol dependence and abuse with severe alcohol withdrawal in 7/2019 requiring Precedex drip in ICU. On admission he was intoxicated with a blood alcohol level of 0.39.  - Placed on CIWA scale with symptom triggered Ativan and scheduled gabapentin.  - Supplement thiamine and folate.  - Chemical dependency consult when mental status clears further.  - Patient has mildly elevated transaminases with an AST of 162 which is consistent with alcohol abuse.    Bicytopenia  - White blood cell count is 2.8, hemoglobin 10.0 with MCV of 103 which is all consistent with alcohol abuse.  - Continue to monitor and trend.    Fall with left thoracic back pain  - Lidocaine patch    Hypokalemia  - Related to vomiting. Replacement protocol in place.     HTN  - Continue home metoprolol and lisinopril.     Diet: Regular  DVT Prophylaxis: SCDs  Palacios Catheter: No  Code Status: Full Code     Disposition Plan   Expected discharge: 2-3 days. Anticipate back to home.     Demarco Mcfadden MD  Hospitalist Service  [facility]  ______________________________________________________________________    Interval History   No acute overnight issues.  Patient was noted to have one episode of emesis.  He states he feels little bit better this morning.  He feels very tremulous.  He has some mild abdominal pain.    Data reviewed  "today: I reviewed all medications, new labs and imaging results over the last 24 hours.     Physical Exam   /89 (BP Location: Left arm)   Pulse 73   Temp 98.7  F (37.1  C) (Oral)   Resp 16   Ht 1.676 m (5' 6\")   Wt 60.4 kg (133 lb 1.6 oz)   SpO2 99%   BMI 21.48 kg/m    133 lbs 1.6 oz       General: Tremulous, slightly unwell appearing but no acute distress.    HEENT: No scleral icterus. Oropharynx moist.     Neck: Supple.    Pulmonary: Normal work of breathing. Clear to auscultation bilaterally.    Cardiovascular: Regular rate and rhythm without murmur or extra heart sounds.    Abdomen: Soft and non-tender.    Extremities: No peripheral edema. No clubbing or cyanosis. Some tenderness to palpation left upper flank.     Neurologic: Quite tremulous but alert and appropriate.    Skin: Warm and dry.    Psychiatric: Normal affect and mood.     Data    Recent Labs   Lab 01/03/20  0644 01/02/20  1935   WBC 2.8* 4.2   HGB 10.0* 12.1*   * 101*    199   INR  --  1.03    141   POTASSIUM 2.8* 3.2*   CHLORIDE 108 107   CO2 25 22   BUN 5* 5*   CR 0.67 0.81   ANIONGAP 9 12   BELIA 8.0* 8.5   GLC 89 110*   ALBUMIN 3.1* 4.0   PROTTOTAL 6.9 8.5   BILITOTAL 0.9 0.9   ALKPHOS 115 146   ALT 45 60   * 233*   TROPI  --  <0.015       Medications      Current Facility-Administered Medications   Medication Dose Route Frequency     cloNIDine  0.1 mg Oral Q8H     folic acid  1 mg Oral Daily     [START ON 1/10/2020] gabapentin  100 mg Oral Q8H     [START ON 1/8/2020] gabapentin  300 mg Oral Q8H     [START ON 1/6/2020] gabapentin  600 mg Oral Q8H     gabapentin  900 mg Oral Q8H     lidocaine  1 patch Transdermal Q24h    And     lidocaine   Transdermal Q8H     metoprolol succinate ER  50 mg Oral QPM     multivitamin w/minerals  1 tablet Oral Daily     sodium chloride (PF)  3 mL Intracatheter Q8H     vitamin B1  100 mg Oral Daily       "

## 2020-01-04 LAB
ANION GAP SERPL CALCULATED.3IONS-SCNC: 5 MMOL/L (ref 3–14)
BASOPHILS # BLD AUTO: 0 10E9/L (ref 0–0.2)
BASOPHILS NFR BLD AUTO: 0.5 %
BUN SERPL-MCNC: 3 MG/DL (ref 7–30)
CALCIUM SERPL-MCNC: 7.7 MG/DL (ref 8.5–10.1)
CHLORIDE SERPL-SCNC: 108 MMOL/L (ref 94–109)
CO2 SERPL-SCNC: 24 MMOL/L (ref 20–32)
CREAT SERPL-MCNC: 0.61 MG/DL (ref 0.66–1.25)
DIFFERENTIAL METHOD BLD: ABNORMAL
EOSINOPHIL # BLD AUTO: 0 10E9/L (ref 0–0.7)
EOSINOPHIL NFR BLD AUTO: 0.8 %
ERYTHROCYTE [DISTWIDTH] IN BLOOD BY AUTOMATED COUNT: 13.8 % (ref 10–15)
GFR SERPL CREATININE-BSD FRML MDRD: >90 ML/MIN/{1.73_M2}
GLUCOSE SERPL-MCNC: 92 MG/DL (ref 70–99)
HCT VFR BLD AUTO: 27.5 % (ref 40–53)
HGB BLD-MCNC: 8.9 G/DL (ref 13.3–17.7)
IMM GRANULOCYTES # BLD: 0 10E9/L (ref 0–0.4)
IMM GRANULOCYTES NFR BLD: 0.5 %
LYMPHOCYTES # BLD AUTO: 1.1 10E9/L (ref 0.8–5.3)
LYMPHOCYTES NFR BLD AUTO: 28.9 %
MCH RBC QN AUTO: 34.1 PG (ref 26.5–33)
MCHC RBC AUTO-ENTMCNC: 32.4 G/DL (ref 31.5–36.5)
MCV RBC AUTO: 105 FL (ref 78–100)
MONOCYTES # BLD AUTO: 0.6 10E9/L (ref 0–1.3)
MONOCYTES NFR BLD AUTO: 14.9 %
NEUTROPHILS # BLD AUTO: 2 10E9/L (ref 1.6–8.3)
NEUTROPHILS NFR BLD AUTO: 54.4 %
NRBC # BLD AUTO: 0 10*3/UL
NRBC BLD AUTO-RTO: 1 /100
PLATELET # BLD AUTO: 120 10E9/L (ref 150–450)
POTASSIUM SERPL-SCNC: 4.2 MMOL/L (ref 3.4–5.3)
RBC # BLD AUTO: 2.61 10E12/L (ref 4.4–5.9)
RETICS # AUTO: 53.8 10E9/L (ref 25–95)
RETICS/RBC NFR AUTO: 2.1 % (ref 0.5–2)
SODIUM SERPL-SCNC: 137 MMOL/L (ref 133–144)
WBC # BLD AUTO: 3.8 10E9/L (ref 4–11)

## 2020-01-04 PROCEDURE — 36415 COLL VENOUS BLD VENIPUNCTURE: CPT | Performed by: INTERNAL MEDICINE

## 2020-01-04 PROCEDURE — 85045 AUTOMATED RETICULOCYTE COUNT: CPT | Performed by: INTERNAL MEDICINE

## 2020-01-04 PROCEDURE — 25000132 ZZH RX MED GY IP 250 OP 250 PS 637: Performed by: INTERNAL MEDICINE

## 2020-01-04 PROCEDURE — 12000000 ZZH R&B MED SURG/OB

## 2020-01-04 PROCEDURE — 85027 COMPLETE CBC AUTOMATED: CPT | Performed by: INTERNAL MEDICINE

## 2020-01-04 PROCEDURE — 80048 BASIC METABOLIC PNL TOTAL CA: CPT | Performed by: INTERNAL MEDICINE

## 2020-01-04 PROCEDURE — 85004 AUTOMATED DIFF WBC COUNT: CPT | Performed by: INTERNAL MEDICINE

## 2020-01-04 PROCEDURE — 25000128 H RX IP 250 OP 636: Performed by: INTERNAL MEDICINE

## 2020-01-04 PROCEDURE — 40000847 ZZHCL STATISTIC MORPHOLOGY W/INTERP HISTOLOGY TC 85060: Performed by: INTERNAL MEDICINE

## 2020-01-04 PROCEDURE — 85060 BLOOD SMEAR INTERPRETATION: CPT | Performed by: INTERNAL MEDICINE

## 2020-01-04 PROCEDURE — 99232 SBSQ HOSP IP/OBS MODERATE 35: CPT | Performed by: INTERNAL MEDICINE

## 2020-01-04 RX ORDER — TRAZODONE HYDROCHLORIDE 50 MG/1
50 TABLET, FILM COATED ORAL AT BEDTIME
Status: DISCONTINUED | OUTPATIENT
Start: 2020-01-04 | End: 2020-01-05 | Stop reason: HOSPADM

## 2020-01-04 RX ADMIN — LIDOCAINE 1 PATCH: 560 PATCH PERCUTANEOUS; TOPICAL; TRANSDERMAL at 19:25

## 2020-01-04 RX ADMIN — FOLIC ACID 1 MG: 1 TABLET ORAL at 08:21

## 2020-01-04 RX ADMIN — CLONIDINE HYDROCHLORIDE 0.1 MG: 0.1 TABLET ORAL at 08:21

## 2020-01-04 RX ADMIN — GABAPENTIN 900 MG: 300 CAPSULE ORAL at 08:21

## 2020-01-04 RX ADMIN — Medication 1 LOZENGE: at 04:28

## 2020-01-04 RX ADMIN — GABAPENTIN 900 MG: 300 CAPSULE ORAL at 16:32

## 2020-01-04 RX ADMIN — ACETAMINOPHEN 650 MG: 325 TABLET, FILM COATED ORAL at 04:31

## 2020-01-04 RX ADMIN — ACETAMINOPHEN 650 MG: 325 TABLET, FILM COATED ORAL at 16:33

## 2020-01-04 RX ADMIN — POTASSIUM CHLORIDE AND SODIUM CHLORIDE: 900; 150 INJECTION, SOLUTION INTRAVENOUS at 04:56

## 2020-01-04 RX ADMIN — MULTIPLE VITAMINS W/ MINERALS TAB 1 TABLET: TAB at 08:21

## 2020-01-04 RX ADMIN — Medication 100 MG: at 08:21

## 2020-01-04 RX ADMIN — ACETAMINOPHEN 650 MG: 325 TABLET, FILM COATED ORAL at 23:07

## 2020-01-04 RX ADMIN — LORAZEPAM 1 MG: 1 TABLET ORAL at 08:21

## 2020-01-04 RX ADMIN — CLONIDINE HYDROCHLORIDE 0.1 MG: 0.1 TABLET ORAL at 16:33

## 2020-01-04 RX ADMIN — LISINOPRIL 20 MG: 20 TABLET ORAL at 08:21

## 2020-01-04 RX ADMIN — LORAZEPAM 1 MG: 1 TABLET ORAL at 04:28

## 2020-01-04 ASSESSMENT — ACTIVITIES OF DAILY LIVING (ADL)
ADLS_ACUITY_SCORE: 14
ADLS_ACUITY_SCORE: 13

## 2020-01-04 NOTE — PLAN OF CARE
Pt is A/O, VSS- remote tele monitoring. C/o pain generalized pain and pain in ribs- Lidocaine patch in place & PRN Tylenol given. IV infusing. CIWA scored 7 &10- gave Ativan x1. Tolerating regular diet. Up with SBA. Will continue plan of care.

## 2020-01-04 NOTE — PLAN OF CARE
Vitals: VSS, afebrile  Oxygen: RA  LOC: AAOx4  Pain: reports mild back/rib pain, heat applied for relief  Ambulation:  SBA w/gait  belt  Tele: SB/SR  Cardiac:bradycardic at times  Lungs:  LS- clear , denies SOB  GI: loose stools, denies nausea  : WNL  Skin: intact  Plan:  manage CIWA scale-  pt received 1mg PO ativan this shift,  possible discharge tomorrow

## 2020-01-04 NOTE — PLAN OF CARE
Pt A&O x4. VSS. On tele. CIWA scores 6 & 3 for tremors and headache. SBA and gb, shaky on feet. IVF @ 100 ml/hr. Lidocaine patch for back pain. Voiding. No bowel movements or  nausea. Reg diet, had a bowl of soup for dinner. CD consult. Plan for discharge home in 2-3 days.

## 2020-01-04 NOTE — PROGRESS NOTES
[facility]    Medicine Progress Note - Hospitalist Service       Date of Admission:  1/2/2020  Length of stay: 2 days    Assessment & Plan   Chalo Freire is a 44-year-old male with a history of hypertension, alcohol dependence, alcohol abuse, alcohol withdrawal who is admitted to the hospitalist service with alcohol intoxication and high risk for alcohol withdrawal.    Since admission patient has demonstrated signs of alcohol withdrawal but has had minimal Ativan needs.    Today:  -Continue CIWA scale with PRN lorazepam.  Only needed Ativan 1 mg overnight.  Anticipate he will likely be able to discharge to home tomorrow.    Alcohol intoxication, alcohol abuse, alcohol dependence, and alcohol withdrawal  - Patient has a history of alcohol dependence and abuse with severe alcohol withdrawal in 7/2019 requiring Precedex drip in ICU. On admission he was intoxicated with a blood alcohol level of 0.39.  - Placed on CIWA scale with symptom triggered Ativan and scheduled gabapentin.  - Supplement thiamine and folate.  - Chemical dependency consult when mental status clears further.  - Patient has mildly elevated transaminases with an AST of 162 which is consistent with alcohol abuse.    Pancytopenia  - White blood cell count is 2.8, hemoglobin 10.0 with MCV of 103, platelets of 120 which is all consistent with alcohol abuse.  - Continue to monitor and trend. Check peripheral smear.     Fall with left thoracic back pain  - Lidocaine patch    Hypokalemia  - Related to vomiting. Replacement protocol in place.     HTN  - Continue home metoprolol and lisinopril.     Diet: Regular  DVT Prophylaxis: SCDs  Palacios Catheter: No  Code Status: Full Code     Disposition Plan   Expected discharge: Tomorrow if no worsening signs of withdrawal. Anticipate back to home.     Demarco Mcfadden MD  Hospitalist Service  [facility]  "Hospital  ______________________________________________________________________    Interval History   Patient says he is feeling better today.  He seems to be less tremulous.  Last received Ativan this morning around 0300.  No nausea or vomiting.  Ambulated to and from the bathroom without difficulty.    Data reviewed today: I reviewed all medications, new labs and imaging results over the last 24 hours.     Physical Exam   /87 (BP Location: Left arm)   Pulse 73   Temp 98.7  F (37.1  C) (Oral)   Resp 15   Ht 1.676 m (5' 6\")   Wt 60.4 kg (133 lb 1.6 oz)   SpO2 97%   BMI 21.48 kg/m    133 lbs 1.6 oz       General: Lying in the bed, not in distress.    HEENT: No scleral icterus. Oropharynx moist.     Neck: Supple.    Pulmonary: Normal work of breathing. Clear to auscultation bilaterally.    Cardiovascular: Regular rate and rhythm without murmur or extra heart sounds.    Abdomen: Soft and non-tender.    Extremities: No peripheral edema. No clubbing or cyanosis. Some tenderness to palpation left upper flank.     Neurologic: Quite tremulous but alert and appropriate.    Skin: Warm and dry.    Psychiatric: Normal affect and mood.     Data    Recent Labs   Lab 01/04/20  0746 01/03/20  1445 01/03/20  0644 01/02/20  1935   WBC 3.8*  --  2.8* 4.2   HGB 8.9*  --  10.0* 12.1*   *  --  103* 101*   *  --  150 199   INR  --   --   --  1.03     --  142 141   POTASSIUM 4.2 4.6 2.8* 3.2*   CHLORIDE 108  --  108 107   CO2 24  --  25 22   BUN 3*  --  5* 5*   CR 0.61*  --  0.67 0.81   ANIONGAP 5  --  9 12   BELIA 7.7*  --  8.0* 8.5   GLC 92  --  89 110*   ALBUMIN  --   --  3.1* 4.0   PROTTOTAL  --   --  6.9 8.5   BILITOTAL  --   --  0.9 0.9   ALKPHOS  --   --  115 146   ALT  --   --  45 60   AST  --   --  162* 233*   TROPI  --   --   --  <0.015       Medications      Current Facility-Administered Medications   Medication Dose Route Frequency     cloNIDine  0.1 mg Oral Q8H     folic acid  1 mg Oral Daily "     [START ON 1/10/2020] gabapentin  100 mg Oral Q8H     [START ON 1/8/2020] gabapentin  300 mg Oral Q8H     [START ON 1/6/2020] gabapentin  600 mg Oral Q8H     gabapentin  900 mg Oral Q8H     lidocaine  1 patch Transdermal Q24h    And     lidocaine   Transdermal Q8H     lisinopril  20 mg Oral Daily     metoprolol succinate ER  50 mg Oral QPM     multivitamin w/minerals  1 tablet Oral Daily     sodium chloride (PF)  3 mL Intracatheter Q8H     vitamin B1  100 mg Oral Daily

## 2020-01-05 VITALS
TEMPERATURE: 97.9 F | WEIGHT: 133.1 LBS | BODY MASS INDEX: 21.39 KG/M2 | SYSTOLIC BLOOD PRESSURE: 110 MMHG | RESPIRATION RATE: 16 BRPM | OXYGEN SATURATION: 98 % | HEIGHT: 66 IN | HEART RATE: 73 BPM | DIASTOLIC BLOOD PRESSURE: 77 MMHG

## 2020-01-05 PROCEDURE — 25000132 ZZH RX MED GY IP 250 OP 250 PS 637: Performed by: INTERNAL MEDICINE

## 2020-01-05 PROCEDURE — 99238 HOSP IP/OBS DSCHRG MGMT 30/<: CPT | Performed by: INTERNAL MEDICINE

## 2020-01-05 RX ORDER — ACETAMINOPHEN 325 MG/1
650 TABLET ORAL EVERY 4 HOURS PRN
Qty: 60 TABLET | Refills: 0 | Status: SHIPPED | OUTPATIENT
Start: 2020-01-05

## 2020-01-05 RX ORDER — IBUPROFEN 600 MG/1
600 TABLET, FILM COATED ORAL EVERY 8 HOURS PRN
Qty: 30 TABLET | Refills: 0 | Status: ON HOLD | OUTPATIENT
Start: 2020-01-05 | End: 2020-05-23

## 2020-01-05 RX ADMIN — GABAPENTIN 900 MG: 300 CAPSULE ORAL at 00:27

## 2020-01-05 RX ADMIN — CLONIDINE HYDROCHLORIDE 0.1 MG: 0.1 TABLET ORAL at 07:59

## 2020-01-05 RX ADMIN — MULTIPLE VITAMINS W/ MINERALS TAB 1 TABLET: TAB at 07:59

## 2020-01-05 RX ADMIN — ACETAMINOPHEN 650 MG: 325 TABLET, FILM COATED ORAL at 04:33

## 2020-01-05 RX ADMIN — FOLIC ACID 1 MG: 1 TABLET ORAL at 07:59

## 2020-01-05 RX ADMIN — LISINOPRIL 20 MG: 20 TABLET ORAL at 07:59

## 2020-01-05 RX ADMIN — CLONIDINE HYDROCHLORIDE 0.1 MG: 0.1 TABLET ORAL at 00:28

## 2020-01-05 RX ADMIN — TRAZODONE HYDROCHLORIDE 50 MG: 50 TABLET ORAL at 00:28

## 2020-01-05 RX ADMIN — Medication 100 MG: at 07:59

## 2020-01-05 RX ADMIN — GABAPENTIN 900 MG: 300 CAPSULE ORAL at 07:59

## 2020-01-05 ASSESSMENT — ACTIVITIES OF DAILY LIVING (ADL)
ADLS_ACUITY_SCORE: 14

## 2020-01-05 NOTE — PLAN OF CARE
VSS ex bradycardic, AOx4, scored 1 and 0 on CIWA overnight, denies nausea, pain located in back/muscles, given prn Tylenol q4 hours, reg diet, tele, voiding in good amnts, calls appropriately, SBA to bathroom, saline locked. CMS intact, LS clear, dry infrequent cough, c/o abdominal discomfort/gurgling from loose stools. Left sticky note to have Tums ordered in the morning; however, pt is hoping to be discharged to home today.

## 2020-01-05 NOTE — PLAN OF CARE
Pt A&O x4. VSS. On tele. SBA with walker. Reg diet. Voiding. Small loose bm this evening, MD paged regarding multiple loose stools today, will monitor for now. CIWA scores 2. Showered this evening. Tylenol and  lidocaine patch for pain. Discharge to home tomorrow.

## 2020-01-05 NOTE — PLAN OF CARE
Vitals: VSS  Oxygen: RA  LOC: AAOx4  Pain: denies pain  Ambulation: Ind  Tele: SB  Cardiac: bradycardic  Lungs: LS-clear, denies SOB  GI: loose BM this AM, reports abdominal cramping at times-heating pad relieves pain  : WNL  Skin: intact  Plan: discharge today, home with spouse via private car. Paperwork reviewed with patient and spouse, questions answered. Patient left with belongings and discharge medications.

## 2020-01-05 NOTE — DISCHARGE SUMMARY
[facility]  Hospitalist Discharge Summary       Date of Admission:  1/2/2020  Date of Discharge:  1/5/2020   Discharging Provider: Demarco Mcfadden MD      Discharge Diagnoses   Alcohol intoxication  Alcohol abuse  Alcohol dependence  Alcohol withdrawal    Follow-ups Needed After Discharge   Follow-up Appointments     Follow-up and recommended labs and tests       Follow up with primary care provider, Allen Flanagan, within 7 days for   hospital follow- up.             Unresulted Labs Ordered in the Past 30 Days of this Admission     Date and Time Order Name Status Description    1/4/2020 0812 Blood Morphology Pathologist Review In process         These results will be followed up by [facility]    Discharge Disposition   Discharged to home  Condition at discharge: Stable    Hospital Course     Patient was admitted for acute alcohol intoxication, alcohol dependence, alcohol abuse and alcohol withdrawal. He was placed on CIWA with symptom triggered Ativan as well as scheduled gabapentin.  Thiamine and folate were supplemented.  He was monitored for withdrawals and on the day of discharge had no longer needed any Ativan.  He was discharged home in stable condition.    Of note, the patient was found to have pancytopenia, with white blood cell count 2.8, hemoglobin 10 with , and platelets of 120.  Etiology likely due to alcohol.  Peripheral smear pending at the time of discharge.     Left thoracic back pain-- Patient has a recent possible rib fracture.  As directed by previous physician, he should have a 20 pound weight restriction at work for the next couple of weeks.    Consultations This Hospital Stay   None    Code Status   Full Code    Time Spent on this Encounter   I, Demarco Mcfadden MD, personally saw the patient today and spent less than or equal to 30 minutes discharging this patient.       Demarco Mcfadden MD  [facility]  Hospital  ______________________________________________________________________    Physical Exam   Vital Signs: Temp: 99.1  F (37.3  C) Temp src: Oral BP: 112/70   Heart Rate: 57 Resp: 15 SpO2: 99 % O2 Device: None (Room air)    Weight: 133 lbs 1.6 oz      General: Lying in the bed, not in distress.    HEENT: No scleral icterus. Oropharynx moist.     Neck: Supple.    Pulmonary: Normal work of breathing. Clear to auscultation bilaterally.    Cardiovascular: Regular rate and rhythm without murmur or extra heart sounds.    Abdomen: Soft and non-tender.    Extremities: No peripheral edema. No clubbing or cyanosis. Some tenderness to palpation left upper flank.     Neurologic: no longer tremulous. Awake, alert and appropriate.     Skin: Warm and dry.    Psychiatric: Normal affect and mood.        Primary Care Physician   Allen Flanagan    Discharge Orders      Reason for your hospital stay    You were admitted for alcohol withdrawal. We treated you with medications to prevent severe complications like seizure. It is essential to stay away from alcohol going forward.     Follow-up and recommended labs and tests     Follow up with primary care provider, Allen Flanagan, within 7 days for hospital follow- up.     Activity    Your activity upon discharge: activity as tolerated     Diet    Follow this diet upon discharge: Orders Placed This Encounter      Combination Diet Regular Diet Adult       Significant Results and Procedures   Most Recent 3 CBC's:  Recent Labs   Lab Test 01/04/20  0746 01/03/20  0644 01/02/20  1935   WBC 3.8* 2.8* 4.2   HGB 8.9* 10.0* 12.1*   * 103* 101*   * 150 199     Most Recent 3 BMP's:  Recent Labs   Lab Test 01/04/20  0746 01/03/20  1445 01/03/20  0644 01/02/20 1935     --  142 141   POTASSIUM 4.2 4.6 2.8* 3.2*   CHLORIDE 108  --  108 107   CO2 24  --  25 22   BUN 3*  --  5* 5*   CR 0.61*  --  0.67 0.81   ANIONGAP 5  --  9 12   BELIA 7.7*  --  8.0* 8.5   GLC 92  --  89 110*     Most  Recent 2 LFT's:  Recent Labs   Lab Test 01/03/20  0644 01/02/20  1935   * 233*   ALT 45 60   ALKPHOS 115 146   BILITOTAL 0.9 0.9     Most Recent 3 INR's:  Recent Labs   Lab Test 01/02/20  1935 05/23/16  1923   INR 1.03 0.98   ,   Results for orders placed or performed during the hospital encounter of 01/02/20   CT Head Perfusion w Contrast    Narrative    EXAM: CT HEAD PERFUSION WITH CONTRAST  LOCATION: [facility]  DATE/TIME: 1/2/2020 7:47 PM    INDICATION: Leg weakness and confusion.  COMPARISON: 01/20/2020.  TECHNIQUE: CT cerebral perfusion was performed utilizing a second contrast bolus. Perfusion data were post processed with generation of standard perfusion maps and estimation of ischemic/infarcted volumes utilizing standard threshold values. Dose   reduction techniques were used.    CONTRAST: 50mL Isovue-370      FINDINGS:   CT PERFUSION:  PERFUSION MAPS: Symmetrical cerebral perfusion. No focal deficits in cerebral blood flow or volume to suggest ischemia/oligemia.    RAPID ANALYSIS:  CBF<30%: 0 mL.  Tmax>6sec: 0 mL.  Mismatch volume: 0 mL.  Mismatch ratio: None      Impression    IMPRESSION:     CT PERFUSION:  1.  Relatively symmetric cerebral perfusion throughout brain parenchyma.   CT Head w/o Contrast    Narrative    EXAM: CT HEAD W/O CONTRAST, CTA  HEAD NECK WITH CONTRAST  LOCATION: [facility]  DATE/TIME: 1/2/2020 7:35 PM    INDICATION: Leg weakness and confusion.  COMPARISON: 04/25/2016.  CONTRAST: 70mL Isovue-370  TECHNIQUE: Head and neck CT angiogram with IV contrast. Noncontrast head CT followed by axial helical CT images of the head and neck vessels obtained during the arterial phase of intravenous contrast administration. Axial 2D reconstructed images and   multiplanar 3D MIP reconstructed images of the head and neck vessels were performed by the technologist. Dose reduction techniques were used. All stenosis measurements made according to NASCET criteria unless  otherwise specified.    FINDINGS:   NONCONTRAST HEAD CT:   INTRACRANIAL CONTENTS: No intracranial hemorrhage, extraaxial collection, or mass effect.  No CT evidence of acute infarct. Normal parenchymal attenuation. The ventricular system, basal cisterns and the cortical sulci are consistent with diffuse volume   loss for age including the cerebellum. This has progressed since the previous study.     VISUALIZED ORBITS/SINUSES/MASTOIDS: No intraorbital abnormality. No paranasal sinus mucosal disease. No middle ear or mastoid effusion.    BONES/SOFT TISSUES: No acute abnormality.    HEAD CTA:  ANTERIOR CIRCULATION: No stenosis/occlusion, aneurysm, or high flow vascular malformation. There is a patent anterior communicating artery with a hypoplastic A1 segment of the right anterior cerebral artery. The left anterior cerebral artery is dominant.    POSTERIOR CIRCULATION: No stenosis/occlusion, aneurysm, or high flow vascular malformation. Balanced vertebral arteries supply a normal basilar artery.     DURAL VENOUS SINUSES: Expected enhancement of the major dural venous sinuses.    NECK CTA:  RIGHT CAROTID: No measurable stenosis or dissection.    LEFT CAROTID: No measurable stenosis or dissection.    VERTEBRAL ARTERIES: No focal stenosis or dissection. Balanced vertebral arteries.    AORTIC ARCH: Classic aortic arch anatomy with no significant stenosis at the origin of the great vessels.    NONVASCULAR STRUCTURES: The lung apices are clear. The paraspinal soft tissues are unremarkable. The cervical spine is within normal limits.      Impression    IMPRESSION:   HEAD CT:  1.  No discrete mass lesion, hemorrhage or focal area suggestive of acute infarct.  2.  Diffuse volume loss for age which has progressed since previous study.    HEAD CTA:   1.  No discrete aneurysm, vascular malformation or intracranial stenosis involving the arteries of the [place].    NECK CTA:  1.  Normal configuration of the great vessels  off aortic arch with no significant stenosis of their origins.  2.  No significant stenosis or irregularity involving arteries of neck by NASCET criteria.  3.  No radiographic evidence of dissection.    Head CT findings were communicated to Dr. Mariscal at 7:56 PM. CTA findings were communicated to Dr. Mariscal at 8:12 PM on 01/02/2020.   CTA Head Neck with Contrast    Narrative    EXAM: CT HEAD W/O CONTRAST, CTA  HEAD NECK WITH CONTRAST  LOCATION: [facility]  DATE/TIME: 1/2/2020 7:35 PM    INDICATION: Leg weakness and confusion.  COMPARISON: 04/25/2016.  CONTRAST: 70mL Isovue-370  TECHNIQUE: Head and neck CT angiogram with IV contrast. Noncontrast head CT followed by axial helical CT images of the head and neck vessels obtained during the arterial phase of intravenous contrast administration. Axial 2D reconstructed images and   multiplanar 3D MIP reconstructed images of the head and neck vessels were performed by the technologist. Dose reduction techniques were used. All stenosis measurements made according to NASCET criteria unless otherwise specified.    FINDINGS:   NONCONTRAST HEAD CT:   INTRACRANIAL CONTENTS: No intracranial hemorrhage, extraaxial collection, or mass effect.  No CT evidence of acute infarct. Normal parenchymal attenuation. The ventricular system, basal cisterns and the cortical sulci are consistent with diffuse volume   loss for age including the cerebellum. This has progressed since the previous study.     VISUALIZED ORBITS/SINUSES/MASTOIDS: No intraorbital abnormality. No paranasal sinus mucosal disease. No middle ear or mastoid effusion.    BONES/SOFT TISSUES: No acute abnormality.    HEAD CTA:  ANTERIOR CIRCULATION: No stenosis/occlusion, aneurysm, or high flow vascular malformation. There is a patent anterior communicating artery with a hypoplastic A1 segment of the right anterior cerebral artery. The left anterior cerebral artery is dominant.    POSTERIOR CIRCULATION: No  stenosis/occlusion, aneurysm, or high flow vascular malformation. Balanced vertebral arteries supply a normal basilar artery.     DURAL VENOUS SINUSES: Expected enhancement of the major dural venous sinuses.    NECK CTA:  RIGHT CAROTID: No measurable stenosis or dissection.    LEFT CAROTID: No measurable stenosis or dissection.    VERTEBRAL ARTERIES: No focal stenosis or dissection. Balanced vertebral arteries.    AORTIC ARCH: Classic aortic arch anatomy with no significant stenosis at the origin of the great vessels.    NONVASCULAR STRUCTURES: The lung apices are clear. The paraspinal soft tissues are unremarkable. The cervical spine is within normal limits.      Impression    IMPRESSION:   HEAD CT:  1.  No discrete mass lesion, hemorrhage or focal area suggestive of acute infarct.  2.  Diffuse volume loss for age which has progressed since previous study.    HEAD CTA:   1.  No discrete aneurysm, vascular malformation or intracranial stenosis involving the arteries of the [place].    NECK CTA:  1.  Normal configuration of the great vessels off aortic arch with no significant stenosis of their origins.  2.  No significant stenosis or irregularity involving arteries of neck by NASCET criteria.  3.  No radiographic evidence of dissection.    Head CT findings were communicated to Dr. Mariscal at 7:56 PM. CTA findings were communicated to Dr. Mariscal at 8:12 PM on 01/02/2020.   XR Pelvis 1/2 Views    Narrative    EXAM: XR PELVIS 1/2 VW  LOCATION: [facility]  DATE/TIME: 1/2/2020 8:48 PM    INDICATION: Fall, pain      Impression    IMPRESSION: Normal joint spaces and alignment. No fracture. Excreted contrast noted in the ureters and bladder.   XR Chest 1 View    Narrative    EXAM: XR CHEST 1 VW  LOCATION: [facility]  DATE/TIME: 1/2/2020 8:49 PM    INDICATION: Fall, weakness, history of rib fractures      Impression    IMPRESSION: Negative chest.       Discharge Medications   Current  Discharge Medication List      START taking these medications    Details   acetaminophen (TYLENOL) 325 MG tablet Take 2 tablets (650 mg) by mouth every 4 hours as needed for mild pain  Qty: 60 tablet, Refills: 0    Associated Diagnoses: Closed fracture of one rib with routine healing, unspecified laterality, subsequent encounter      ibuprofen (ADVIL/MOTRIN) 600 MG tablet Take 1 tablet (600 mg) by mouth every 8 hours as needed for moderate pain  Qty: 30 tablet, Refills: 0    Associated Diagnoses: Closed fracture of one rib with routine healing, unspecified laterality, subsequent encounter         CONTINUE these medications which have NOT CHANGED    Details   lisinopril (PRINIVIL,ZESTRIL) 20 MG tablet Take 1 tablet (20 mg) by mouth daily  Qty: 30 tablet, Refills: 0    Comments: HTN  Associated Diagnoses: Alcohol withdrawal, uncomplicated (H)      metoprolol succinate ER (TOPROL-XL) 50 MG 24 hr tablet Take 1 tablet (50 mg) by mouth every evening  Qty: 30 tablet, Refills: 0    Associated Diagnoses: Benign essential hypertension      potassium chloride (KLOR-CON) 20 MEQ packet Take 20 mEq by mouth 2 times daily      traZODone (DESYREL) 50 MG tablet Take 1 tablet (50 mg) by mouth At Bedtime  Qty: 30 tablet, Refills: 0    Associated Diagnoses: Current mild episode of major depressive disorder, unspecified whether recurrent (H)         STOP taking these medications       folic acid (FOLVITE) 1 MG tablet Comments:   Reason for Stopping:             Allergies   Allergies   Allergen Reactions     Hydrocodone

## 2020-01-06 LAB — COPATH REPORT: NORMAL

## 2020-03-23 ENCOUNTER — APPOINTMENT (OUTPATIENT)
Dept: GENERAL RADIOLOGY | Facility: CLINIC | Age: 45
End: 2020-03-23
Attending: EMERGENCY MEDICINE
Payer: COMMERCIAL

## 2020-03-23 ENCOUNTER — HOSPITAL ENCOUNTER (EMERGENCY)
Facility: CLINIC | Age: 45
Discharge: HOME | End: 2020-03-23
Attending: EMERGENCY MEDICINE | Admitting: EMERGENCY MEDICINE
Payer: COMMERCIAL

## 2020-03-23 VITALS
SYSTOLIC BLOOD PRESSURE: 140 MMHG | DIASTOLIC BLOOD PRESSURE: 98 MMHG | HEART RATE: 70 BPM | TEMPERATURE: 98.5 F | RESPIRATION RATE: 10 BRPM | OXYGEN SATURATION: 96 %

## 2020-03-23 DIAGNOSIS — F32.0 CURRENT MILD EPISODE OF MAJOR DEPRESSIVE DISORDER, UNSPECIFIED WHETHER RECURRENT (H): ICD-10-CM

## 2020-03-23 DIAGNOSIS — F10.930 ALCOHOL WITHDRAWAL, UNCOMPLICATED (H): ICD-10-CM

## 2020-03-23 DIAGNOSIS — I10 BENIGN ESSENTIAL HYPERTENSION: ICD-10-CM

## 2020-03-23 DIAGNOSIS — F10.231: ICD-10-CM

## 2020-03-23 DIAGNOSIS — R06.00 DYSPNEA, UNSPECIFIED TYPE: ICD-10-CM

## 2020-03-23 DIAGNOSIS — R07.89 ATYPICAL CHEST PAIN: ICD-10-CM

## 2020-03-23 PROBLEM — K70.0 ALCOHOLIC FATTY LIVER: Status: ACTIVE | Noted: 2018-03-07

## 2020-03-23 PROBLEM — F10.10 ALCOHOL ABUSE: Status: ACTIVE | Noted: 2020-01-23

## 2020-03-23 PROBLEM — K44.9 ESOPHAGEAL HIATUS HERNIA: Status: ACTIVE | Noted: 2017-11-10

## 2020-03-23 LAB
ALBUMIN SERPL-MCNC: 4.3 G/DL (ref 3.4–5)
ALP SERPL-CCNC: 104 U/L (ref 40–150)
ALT SERPL W P-5'-P-CCNC: 84 U/L (ref 0–70)
ANION GAP SERPL CALCULATED.3IONS-SCNC: 11 MMOL/L (ref 3–14)
AST SERPL W P-5'-P-CCNC: 144 U/L (ref 0–45)
BASOPHILS # BLD AUTO: 0 10E9/L (ref 0–0.2)
BASOPHILS NFR BLD AUTO: 0.2 %
BILIRUB SERPL-MCNC: 2 MG/DL (ref 0.2–1.3)
BUN SERPL-MCNC: 13 MG/DL (ref 7–30)
CALCIUM SERPL-MCNC: 9.2 MG/DL (ref 8.5–10.1)
CHLORIDE SERPL-SCNC: 93 MMOL/L (ref 94–109)
CO2 SERPL-SCNC: 28 MMOL/L (ref 20–32)
CREAT SERPL-MCNC: 0.84 MG/DL (ref 0.66–1.25)
DIFFERENTIAL METHOD BLD: ABNORMAL
EOSINOPHIL # BLD AUTO: 0 10E9/L (ref 0–0.7)
EOSINOPHIL NFR BLD AUTO: 0 %
ERYTHROCYTE [DISTWIDTH] IN BLOOD BY AUTOMATED COUNT: 12.3 % (ref 10–15)
ETHANOL SERPL-MCNC: <0.01 G/DL
GFR SERPL CREATININE-BSD FRML MDRD: >90 ML/MIN/{1.73_M2}
GLUCOSE SERPL-MCNC: 182 MG/DL (ref 70–99)
HCT VFR BLD AUTO: 36.5 % (ref 40–53)
HGB BLD-MCNC: 12.4 G/DL (ref 13.3–17.7)
IMM GRANULOCYTES # BLD: 0 10E9/L (ref 0–0.4)
IMM GRANULOCYTES NFR BLD: 0.3 %
LIPASE SERPL-CCNC: 223 U/L (ref 73–393)
LYMPHOCYTES # BLD AUTO: 0.4 10E9/L (ref 0.8–5.3)
LYMPHOCYTES NFR BLD AUTO: 6.8 %
MCH RBC QN AUTO: 33.2 PG (ref 26.5–33)
MCHC RBC AUTO-ENTMCNC: 34 G/DL (ref 31.5–36.5)
MCV RBC AUTO: 98 FL (ref 78–100)
MONOCYTES # BLD AUTO: 0.4 10E9/L (ref 0–1.3)
MONOCYTES NFR BLD AUTO: 7.5 %
NEUTROPHILS # BLD AUTO: 5 10E9/L (ref 1.6–8.3)
NEUTROPHILS NFR BLD AUTO: 85.2 %
NRBC # BLD AUTO: 0 10*3/UL
NRBC BLD AUTO-RTO: 0 /100
NT-PROBNP SERPL-MCNC: 85 PG/ML (ref 0–450)
PLATELET # BLD AUTO: 131 10E9/L (ref 150–450)
POTASSIUM SERPL-SCNC: 3.5 MMOL/L (ref 3.4–5.3)
PROT SERPL-MCNC: 9.4 G/DL (ref 6.8–8.8)
RBC # BLD AUTO: 3.74 10E12/L (ref 4.4–5.9)
SODIUM SERPL-SCNC: 132 MMOL/L (ref 133–144)
TROPONIN I SERPL-MCNC: <0.015 UG/L (ref 0–0.04)
WBC # BLD AUTO: 5.9 10E9/L (ref 4–11)

## 2020-03-23 PROCEDURE — 96375 TX/PRO/DX INJ NEW DRUG ADDON: CPT

## 2020-03-23 PROCEDURE — 80053 COMPREHEN METABOLIC PANEL: CPT | Performed by: EMERGENCY MEDICINE

## 2020-03-23 PROCEDURE — 25000125 ZZHC RX 250: Performed by: EMERGENCY MEDICINE

## 2020-03-23 PROCEDURE — 80320 DRUG SCREEN QUANTALCOHOLS: CPT | Performed by: EMERGENCY MEDICINE

## 2020-03-23 PROCEDURE — 83880 ASSAY OF NATRIURETIC PEPTIDE: CPT | Performed by: EMERGENCY MEDICINE

## 2020-03-23 PROCEDURE — 96374 THER/PROPH/DIAG INJ IV PUSH: CPT

## 2020-03-23 PROCEDURE — 25000132 ZZH RX MED GY IP 250 OP 250 PS 637: Performed by: EMERGENCY MEDICINE

## 2020-03-23 PROCEDURE — 71045 X-RAY EXAM CHEST 1 VIEW: CPT

## 2020-03-23 PROCEDURE — 85025 COMPLETE CBC W/AUTO DIFF WBC: CPT | Performed by: EMERGENCY MEDICINE

## 2020-03-23 PROCEDURE — 84484 ASSAY OF TROPONIN QUANT: CPT | Performed by: EMERGENCY MEDICINE

## 2020-03-23 PROCEDURE — 99285 EMERGENCY DEPT VISIT HI MDM: CPT | Mod: 25

## 2020-03-23 PROCEDURE — 93005 ELECTROCARDIOGRAM TRACING: CPT

## 2020-03-23 PROCEDURE — 83690 ASSAY OF LIPASE: CPT | Performed by: EMERGENCY MEDICINE

## 2020-03-23 PROCEDURE — 36415 COLL VENOUS BLD VENIPUNCTURE: CPT | Performed by: EMERGENCY MEDICINE

## 2020-03-23 PROCEDURE — 25000128 H RX IP 250 OP 636: Performed by: EMERGENCY MEDICINE

## 2020-03-23 PROCEDURE — 84484 ASSAY OF TROPONIN QUANT: CPT | Mod: 91 | Performed by: EMERGENCY MEDICINE

## 2020-03-23 RX ORDER — DIAZEPAM 10 MG/2ML
5 INJECTION, SOLUTION INTRAMUSCULAR; INTRAVENOUS ONCE
Status: COMPLETED | OUTPATIENT
Start: 2020-03-23 | End: 2020-03-23

## 2020-03-23 RX ORDER — LISINOPRIL 20 MG/1
20 TABLET ORAL DAILY
Qty: 30 TABLET | Refills: 0 | Status: SHIPPED | OUTPATIENT
Start: 2020-03-23

## 2020-03-23 RX ORDER — TRAZODONE HYDROCHLORIDE 50 MG/1
50 TABLET, FILM COATED ORAL AT BEDTIME
Qty: 30 TABLET | Refills: 0 | Status: SHIPPED | OUTPATIENT
Start: 2020-03-23

## 2020-03-23 RX ORDER — POTASSIUM CHLORIDE 1.5 G/1.58G
20 POWDER, FOR SOLUTION ORAL 2 TIMES DAILY
Qty: 60 PACKET | Refills: 0 | Status: ON HOLD | OUTPATIENT
Start: 2020-03-23 | End: 2020-05-24

## 2020-03-23 RX ORDER — ALUMINA, MAGNESIA, AND SIMETHICONE 2400; 2400; 240 MG/30ML; MG/30ML; MG/30ML
20 SUSPENSION ORAL ONCE
Status: COMPLETED | OUTPATIENT
Start: 2020-03-23 | End: 2020-03-23

## 2020-03-23 RX ORDER — METOPROLOL SUCCINATE 50 MG/1
50 TABLET, EXTENDED RELEASE ORAL EVERY EVENING
Qty: 30 TABLET | Refills: 0 | Status: SHIPPED | OUTPATIENT
Start: 2020-03-23

## 2020-03-23 RX ORDER — IBUPROFEN 600 MG/1
600 TABLET, FILM COATED ORAL ONCE
Status: COMPLETED | OUTPATIENT
Start: 2020-03-23 | End: 2020-03-23

## 2020-03-23 RX ADMIN — DIAZEPAM 5 MG: 5 INJECTION, SOLUTION INTRAMUSCULAR; INTRAVENOUS at 13:47

## 2020-03-23 RX ADMIN — ALUMINUM HYDROXIDE, MAGNESIUM HYDROXIDE, AND DIMETHICONE 20 ML: 400; 400; 40 SUSPENSION ORAL at 09:32

## 2020-03-23 RX ADMIN — IBUPROFEN 600 MG: 600 TABLET, FILM COATED ORAL at 12:56

## 2020-03-23 RX ADMIN — FAMOTIDINE 20 MG: 10 INJECTION, SOLUTION INTRAVENOUS at 10:24

## 2020-03-23 ASSESSMENT — ENCOUNTER SYMPTOMS
SEIZURES: 0
COUGH: 1
DYSURIA: 0
FLANK PAIN: 0
SHORTNESS OF BREATH: 1
FEVER: 0
CONFUSION: 0
MYALGIAS: 0
VOMITING: 0
ABDOMINAL PAIN: 0
CHILLS: 1
PALPITATIONS: 0
SORE THROAT: 0
TREMORS: 1
NAUSEA: 0
ARTHRALGIAS: 0
APPETITE CHANGE: 1
ABDOMINAL DISTENTION: 0

## 2020-03-23 NOTE — ED PROVIDER NOTES
I received signout on this 44-year-old male patient.  At the time of signout the plan was for the patient to be admitted due to concerns for alcohol withdrawal.  The patient was evaluated by the hospitalist service who stated that the patient did seem to be more concerned about his blood pressure medications and that he did not seem to have any altered mental status.     I talked with the patient and he states that he would feel comfortable going home though he does ask for a work note stating that he can work from home as well as a refill on his metoprolol, lisinopril, and trazodone.  The patient states he is able to self isolate, and I did give return precautions.  A third troponin was ordered and is negative.  The patient's stress test in June 2019 did not seem to show evidence of cardiac ischemia.  Patient is PERC negative.     A third troponin is negative.  Patient is comfortable being discharged and knows he can return to the ED for reevaluation.    Impression:    ICD-10-CM    1. Atypical chest pain  R07.89 Troponin I (now)     Troponin I   2. Acute, mixed level of activity, alcohol withdrawal delirium (H)  F10.231    3. Dyspnea, unspecified type  R06.00        Discharge Medications:  Discharge Medication List as of 3/23/2020  4:09 PM      CONTINUE these medications which have CHANGED    Details   lisinopril (ZESTRIL) 20 MG tablet Take 1 tablet (20 mg) by mouth daily, Disp-30 tablet,R-0, Local Print      metoprolol succinate ER (TOPROL-XL) 50 MG 24 hr tablet Take 1 tablet (50 mg) by mouth every evening, Disp-30 tablet,R-0, Local Print      potassium chloride (KLOR-CON) 20 MEQ packet Take 20 mEq by mouth 2 times daily, Disp-60 packet,R-0, Local Print      traZODone (DESYREL) 50 MG tablet Take 1 tablet (50 mg) by mouth At Bedtime, Disp-30 tablet,R-0, Local Print                    Jered Vidal,   03/23/20 3637

## 2020-03-23 NOTE — DISCHARGE INSTRUCTIONS
Please use the information at the end of this document to sign up for [facility] [employer] where you can get your results and a message about those results sent to you through the [employer] application. If you do not have mychart we will call you with your results but it may take longer.    Regardless of if you have been tested or not:  Patient who have symptoms (cough, fever, or shortness of breath), need to isolate for 7 days from when symptoms started OR 72 hours after fever resolves (without fever reducing medications) AND improvement of respiratory symptoms (whichever is longer).    Isolate yourself at home (in own room/own bathroom if possible)  Do Not allow any visitors  Do Not go to work or school  Do Not go to Religious,  centers, shopping, or other public places.  Do Not shake hands.  Avoid close and intimate contact with others (hugging, kissing).  Follow CDC recommendations for household cleaning of frequently touched services.     After the initial 7 days, continue to isolate yourself from household members as much as possible. To continue decrease the risk of community spread and exposure, you and any members of your household should limit activities in public for 14 days after starting home isolation.     You can reference the following CDC link for helpful home isolation/care tips:  https://www.cdc.gov/coronavirus/2019-ncov/downloads/10Things.pdf    Protect Others:  Cover Your Mouth and Nose with a mask, disposable tissue or wash cloth to avoid spreading germs to others.  Wash your hands and face frequently with soap and water    Call Back If: Breathing difficulty develops or you become worse.    For more information about COVID19 and options for caring for yourself at home, please visit the CDC website at https://www.cdc.gov/coronavirus/2019-ncov/about/steps-when-sick.html  For more options for care at [facility], please visit our website at  [email or web address]
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
## 2020-03-23 NOTE — LETTER
March 23, 2020      To Whom It May Concern:      Chalo Freire was seen in our Emergency Department today, 03/23/20.  Given the current healthcare situation in [place], and based on the patient's symptoms, he should isolate at home for 14 days.  Please allow your employee to work from home if at all possible.    Sincerely,        Jered Vidal, DO
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
## 2020-03-23 NOTE — ED AVS SNAPSHOT
[facility] Emergency Department  [address]  [facility] [address]  Phone:  [phone]  Fax:  [phone]                                    Chalo Freire   MRN: [id number]    Department:  [facility] Emergency Department   Date of Visit:  3/23/2020           After Visit Summary Signature Page    I have received my discharge instructions, and my questions have been answered. I have discussed any challenges I see with this plan with the nurse or doctor.    ..........................................................................................................................................  Patient/Patient Representative Signature      ..........................................................................................................................................  Patient Representative Print Name and Relationship to Patient    ..................................................               ................................................  Date                                   Time    ..........................................................................................................................................  Reviewed by Signature/Title    ...................................................              ..............................................  Date                                               Time          22EPIC Rev 08/18
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
## 2020-03-24 LAB — INTERPRETATION ECG - MUSE: NORMAL

## 2020-05-23 ENCOUNTER — TRANSFERRED RECORDS (OUTPATIENT)
Dept: HEALTH INFORMATION MANAGEMENT | Facility: CLINIC | Age: 45
End: 2020-05-23

## 2020-05-23 ENCOUNTER — HOSPITAL ENCOUNTER (OUTPATIENT)
Facility: CLINIC | Age: 45
Setting detail: OBSERVATION
Discharge: HOME | End: 2020-05-24
Attending: INTERNAL MEDICINE | Admitting: HOSPITALIST
Payer: COMMERCIAL

## 2020-05-23 DIAGNOSIS — S22.42XA CLOSED FRACTURE OF MULTIPLE RIBS OF LEFT SIDE, INITIAL ENCOUNTER: Primary | ICD-10-CM

## 2020-05-23 PROBLEM — E87.6 HYPOKALEMIA: Status: ACTIVE | Noted: 2020-05-23

## 2020-05-23 LAB
ALBUMIN SERPL-MCNC: 3.6 G/DL (ref 3.4–5)
ALP SERPL-CCNC: 107 U/L (ref 40–150)
ALT SERPL W P-5'-P-CCNC: 102 U/L (ref 0–70)
ANION GAP SERPL CALCULATED.3IONS-SCNC: 7 MMOL/L (ref 3–14)
AST SERPL W P-5'-P-CCNC: 229 U/L (ref 0–45)
BILIRUB SERPL-MCNC: 1.8 MG/DL (ref 0.2–1.3)
BUN SERPL-MCNC: 5 MG/DL (ref 7–30)
CALCIUM SERPL-MCNC: 7.5 MG/DL (ref 8.5–10.1)
CHLORIDE SERPL-SCNC: 96 MMOL/L (ref 94–109)
CO2 SERPL-SCNC: 31 MMOL/L (ref 20–32)
CREAT SERPL-MCNC: 0.6 MG/DL (ref 0.66–1.25)
CREAT SERPL-MCNC: 0.6 MG/DL (ref 0.66–1.25)
ERYTHROCYTE [DISTWIDTH] IN BLOOD BY AUTOMATED COUNT: 13.4 % (ref 10–15)
ETHANOL SERPL-MCNC: <0.01 G/DL
GFR SERPL CREATININE-BSD FRML MDRD: >60 ML/MIN/1.73M2
GFR SERPL CREATININE-BSD FRML MDRD: >90 ML/MIN/{1.73_M2}
GLUCOSE SERPL-MCNC: 101 MG/DL (ref 74–106)
GLUCOSE SERPL-MCNC: 98 MG/DL (ref 70–99)
HCT VFR BLD AUTO: 38.7 % (ref 40–53)
HGB BLD-MCNC: 12.8 G/DL (ref 13.3–17.7)
MAGNESIUM SERPL-MCNC: 1.9 MG/DL (ref 1.6–2.3)
MCH RBC QN AUTO: 33.5 PG (ref 26.5–33)
MCHC RBC AUTO-ENTMCNC: 33.1 G/DL (ref 31.5–36.5)
MCV RBC AUTO: 101 FL (ref 78–100)
PHOSPHATE SERPL-MCNC: 2.7 MG/DL (ref 2.5–4.5)
PLATELET # BLD AUTO: 168 10E9/L (ref 150–450)
POTASSIUM SERPL-SCNC: 2.1 MMOL/L (ref 3.5–5.1)
POTASSIUM SERPL-SCNC: 2.4 MMOL/L (ref 3.4–5.3)
PROT SERPL-MCNC: 8.2 G/DL (ref 6.8–8.8)
RBC # BLD AUTO: 3.82 10E12/L (ref 4.4–5.9)
SODIUM SERPL-SCNC: 134 MMOL/L (ref 133–144)
WBC # BLD AUTO: 5.7 10E9/L (ref 4–11)

## 2020-05-23 PROCEDURE — 96361 HYDRATE IV INFUSION ADD-ON: CPT

## 2020-05-23 PROCEDURE — 96374 THER/PROPH/DIAG INJ IV PUSH: CPT

## 2020-05-23 PROCEDURE — 40000275 ZZH STATISTIC RCP TIME EA 10 MIN

## 2020-05-23 PROCEDURE — 36415 COLL VENOUS BLD VENIPUNCTURE: CPT | Performed by: HOSPITALIST

## 2020-05-23 PROCEDURE — 93010 ELECTROCARDIOGRAM REPORT: CPT | Performed by: INTERNAL MEDICINE

## 2020-05-23 PROCEDURE — 99220 ZZC INITIAL OBSERVATION CARE,LEVL III: CPT | Performed by: HOSPITALIST

## 2020-05-23 PROCEDURE — 25800030 ZZH RX IP 258 OP 636: Performed by: HOSPITALIST

## 2020-05-23 PROCEDURE — 80320 DRUG SCREEN QUANTALCOHOLS: CPT | Performed by: HOSPITALIST

## 2020-05-23 PROCEDURE — 85027 COMPLETE CBC AUTOMATED: CPT | Performed by: HOSPITALIST

## 2020-05-23 PROCEDURE — 84100 ASSAY OF PHOSPHORUS: CPT | Performed by: HOSPITALIST

## 2020-05-23 PROCEDURE — 80053 COMPREHEN METABOLIC PANEL: CPT | Performed by: HOSPITALIST

## 2020-05-23 PROCEDURE — 25000128 H RX IP 250 OP 636: Performed by: HOSPITALIST

## 2020-05-23 PROCEDURE — 99214 OFFICE O/P EST MOD 30 MIN: CPT | Performed by: SURGERY

## 2020-05-23 PROCEDURE — 96376 TX/PRO/DX INJ SAME DRUG ADON: CPT

## 2020-05-23 PROCEDURE — 25000125 ZZHC RX 250: Performed by: HOSPITALIST

## 2020-05-23 PROCEDURE — 25000132 ZZH RX MED GY IP 250 OP 250 PS 637: Performed by: HOSPITALIST

## 2020-05-23 PROCEDURE — 93005 ELECTROCARDIOGRAM TRACING: CPT

## 2020-05-23 PROCEDURE — G0378 HOSPITAL OBSERVATION PER HR: HCPCS

## 2020-05-23 PROCEDURE — 83735 ASSAY OF MAGNESIUM: CPT | Performed by: HOSPITALIST

## 2020-05-23 RX ORDER — IBUPROFEN 200 MG
600 TABLET ORAL EVERY 8 HOURS PRN
COMMUNITY

## 2020-05-23 RX ORDER — POTASSIUM CL/LIDO/0.9 % NACL 10MEQ/0.1L
10 INTRAVENOUS SOLUTION, PIGGYBACK (ML) INTRAVENOUS
Status: DISCONTINUED | OUTPATIENT
Start: 2020-05-23 | End: 2020-05-24 | Stop reason: HOSPADM

## 2020-05-23 RX ORDER — ACETAMINOPHEN 650 MG/1
650 SUPPOSITORY RECTAL EVERY 4 HOURS PRN
Status: DISCONTINUED | OUTPATIENT
Start: 2020-05-23 | End: 2020-05-24 | Stop reason: HOSPADM

## 2020-05-23 RX ORDER — POTASSIUM CHLORIDE 1500 MG/1
20-40 TABLET, EXTENDED RELEASE ORAL
Status: DISCONTINUED | OUTPATIENT
Start: 2020-05-23 | End: 2020-05-24 | Stop reason: HOSPADM

## 2020-05-23 RX ORDER — LISINOPRIL 20 MG/1
20 TABLET ORAL DAILY
Status: DISCONTINUED | OUTPATIENT
Start: 2020-05-24 | End: 2020-05-24 | Stop reason: HOSPADM

## 2020-05-23 RX ORDER — ACETAMINOPHEN 325 MG/1
650 TABLET ORAL EVERY 4 HOURS PRN
Status: DISCONTINUED | OUTPATIENT
Start: 2020-05-23 | End: 2020-05-24 | Stop reason: HOSPADM

## 2020-05-23 RX ORDER — MAGNESIUM SULFATE HEPTAHYDRATE 40 MG/ML
4 INJECTION, SOLUTION INTRAVENOUS EVERY 4 HOURS PRN
Status: DISCONTINUED | OUTPATIENT
Start: 2020-05-23 | End: 2020-05-24 | Stop reason: HOSPADM

## 2020-05-23 RX ORDER — LORAZEPAM 1 MG/1
1-2 TABLET ORAL EVERY 30 MIN PRN
Status: DISCONTINUED | OUTPATIENT
Start: 2020-05-23 | End: 2020-05-24

## 2020-05-23 RX ORDER — LISINOPRIL 20 MG/1
20 TABLET ORAL DAILY
Status: DISCONTINUED | OUTPATIENT
Start: 2020-05-23 | End: 2020-05-23 | Stop reason: DRUGHIGH

## 2020-05-23 RX ORDER — IBUPROFEN 600 MG/1
600 TABLET, FILM COATED ORAL EVERY 8 HOURS PRN
Status: DISCONTINUED | OUTPATIENT
Start: 2020-05-23 | End: 2020-05-24 | Stop reason: HOSPADM

## 2020-05-23 RX ORDER — POTASSIUM CHLORIDE 7.45 MG/ML
10 INJECTION INTRAVENOUS
Status: DISCONTINUED | OUTPATIENT
Start: 2020-05-23 | End: 2020-05-24 | Stop reason: HOSPADM

## 2020-05-23 RX ORDER — LIDOCAINE 4 G/G
1 PATCH TOPICAL
Status: DISCONTINUED | OUTPATIENT
Start: 2020-05-23 | End: 2020-05-24 | Stop reason: HOSPADM

## 2020-05-23 RX ORDER — FOLIC ACID 1 MG/1
1 TABLET ORAL DAILY
COMMUNITY

## 2020-05-23 RX ORDER — POTASSIUM CHLORIDE 29.8 MG/ML
20 INJECTION INTRAVENOUS
Status: DISCONTINUED | OUTPATIENT
Start: 2020-05-23 | End: 2020-05-24 | Stop reason: HOSPADM

## 2020-05-23 RX ORDER — LORAZEPAM 2 MG/ML
1-2 INJECTION INTRAMUSCULAR EVERY 30 MIN PRN
Status: DISCONTINUED | OUTPATIENT
Start: 2020-05-23 | End: 2020-05-24

## 2020-05-23 RX ORDER — LANOLIN ALCOHOL/MO/W.PET/CERES
100 CREAM (GRAM) TOPICAL DAILY
Status: DISCONTINUED | OUTPATIENT
Start: 2020-05-24 | End: 2020-05-24 | Stop reason: HOSPADM

## 2020-05-23 RX ORDER — ACETAMINOPHEN 325 MG/1
650 TABLET ORAL EVERY 4 HOURS PRN
Status: DISCONTINUED | OUTPATIENT
Start: 2020-05-23 | End: 2020-05-23

## 2020-05-23 RX ORDER — POTASSIUM CHLORIDE 1.5 G/1.58G
20-40 POWDER, FOR SOLUTION ORAL
Status: DISCONTINUED | OUTPATIENT
Start: 2020-05-23 | End: 2020-05-24 | Stop reason: HOSPADM

## 2020-05-23 RX ORDER — ONDANSETRON 2 MG/ML
4 INJECTION INTRAMUSCULAR; INTRAVENOUS EVERY 6 HOURS PRN
Status: DISCONTINUED | OUTPATIENT
Start: 2020-05-23 | End: 2020-05-24 | Stop reason: HOSPADM

## 2020-05-23 RX ORDER — NALOXONE HYDROCHLORIDE 0.4 MG/ML
.1-.4 INJECTION, SOLUTION INTRAMUSCULAR; INTRAVENOUS; SUBCUTANEOUS
Status: DISCONTINUED | OUTPATIENT
Start: 2020-05-23 | End: 2020-05-24 | Stop reason: HOSPADM

## 2020-05-23 RX ORDER — TRAZODONE HYDROCHLORIDE 50 MG/1
50 TABLET, FILM COATED ORAL AT BEDTIME
Status: DISCONTINUED | OUTPATIENT
Start: 2020-05-23 | End: 2020-05-24 | Stop reason: HOSPADM

## 2020-05-23 RX ORDER — METOPROLOL SUCCINATE 50 MG/1
50 TABLET, EXTENDED RELEASE ORAL EVERY EVENING
Status: DISCONTINUED | OUTPATIENT
Start: 2020-05-23 | End: 2020-05-24 | Stop reason: HOSPADM

## 2020-05-23 RX ORDER — MULTIPLE VITAMINS W/ MINERALS TAB 9MG-400MCG
1 TAB ORAL DAILY
Status: DISCONTINUED | OUTPATIENT
Start: 2020-05-24 | End: 2020-05-24 | Stop reason: HOSPADM

## 2020-05-23 RX ORDER — FOLIC ACID 1 MG/1
1 TABLET ORAL DAILY
Status: DISCONTINUED | OUTPATIENT
Start: 2020-05-24 | End: 2020-05-24 | Stop reason: HOSPADM

## 2020-05-23 RX ORDER — ONDANSETRON 4 MG/1
4 TABLET, ORALLY DISINTEGRATING ORAL EVERY 6 HOURS PRN
Status: DISCONTINUED | OUTPATIENT
Start: 2020-05-23 | End: 2020-05-24 | Stop reason: HOSPADM

## 2020-05-23 RX ADMIN — LIDOCAINE 1 PATCH: 560 PATCH PERCUTANEOUS; TOPICAL; TRANSDERMAL at 20:12

## 2020-05-23 RX ADMIN — Medication 10 MEQ: at 20:13

## 2020-05-23 RX ADMIN — METOPROLOL SUCCINATE 50 MG: 50 TABLET, EXTENDED RELEASE ORAL at 20:13

## 2020-05-23 RX ADMIN — Medication 10 MEQ: at 23:43

## 2020-05-23 RX ADMIN — ACETAMINOPHEN 650 MG: 325 TABLET, FILM COATED ORAL at 21:35

## 2020-05-23 RX ADMIN — TRAZODONE HYDROCHLORIDE 50 MG: 50 TABLET ORAL at 21:35

## 2020-05-23 RX ADMIN — FOLIC ACID: 5 INJECTION, SOLUTION INTRAMUSCULAR; INTRAVENOUS; SUBCUTANEOUS at 18:11

## 2020-05-23 RX ADMIN — Medication 10 MEQ: at 21:35

## 2020-05-23 RX ADMIN — Medication 10 MEQ: at 22:34

## 2020-05-23 RX ADMIN — Medication 1 MG: at 23:42

## 2020-05-23 ASSESSMENT — COLUMBIA-SUICIDE SEVERITY RATING SCALE - C-SSRS
1. IN THE PAST MONTH, HAVE YOU WISHED YOU WERE DEAD OR WISHED YOU COULD GO TO SLEEP AND NOT WAKE UP?: NO
2. HAVE YOU ACTUALLY HAD ANY THOUGHTS OF KILLING YOURSELF IN THE PAST MONTH?: NO

## 2020-05-23 NOTE — H&P
[facility]    History and Physical  Hospitalist       Date of Admission:  5/23/2020    Assessment & Plan   Chalo Freire is a 45 year old male with a past medical history of alcohol use with admission in January 2020 for alcohol withdrawal, rib contusion reported in December 2020 who presented to the urgent care facility today for left-sided rib pain after hitting his side on the kitchen counter.    #Left sided Rib Pain: Patient states that about a week ago, he hit his left side on the kitchen counter.  At the time he was reaching up to try to grab something from the cabinet, he fell onto his left side.  He had immediate pain to the area.  He states he has been having pain since then that feels like the pain he was having with his rib contusions in December.  He had a CT abdomen pelvis that showed subacute fractures of the left 10th through 12th ribs.  Pain under decent control, rated 2/10.  Not requiring narcotics.   -Surgery consulted for trauma eval given 3 ribs involvement  -PRN tylenol, ibuprofen, lidocaine patch  -Incentive spirometry    #Alcohol use disorder with history of withdrawal: Prior admission in January 2020 for withdrawal.  Pt states he has had approximately 3 beers in last 2 weeks.  -CIWA protocol   -IV vitamins followed by PO  -Check LFT's and alcohol level    #Hypokalemia: This was actual reason for transfer request.  K+ 2.1 at urgent care.  Pt endorses poor PO intake over last week.  Is on supplemental potassium therapy at home.  -Recheck BMP  -Electrolyte replacement protocol    #Pancreatic Mass/hypodensity: Incidental finding on CT A/P.  Area of decreased density or enhancement in the posterior head of the pancreas. Related to past episode of pancreatitis vs. Mass?  -GI consulted    #HTN: Continue home lisinopril, metoprolol    DVT Prophylaxis: Pneumatic Compression Devices  Code Status: Full Code  Expected discharge: Tomorrow, recommended to prior living arrangement     Malik  MD Hood    Primary Care Physician   Allen Flanagan    Chief Complaint   Hypokalemia    History is obtained from the patient, patient's chart    History of Present Illness   Chalo Freire is a 45 year old male with a past medical history of alcohol use with admission in January 2020 for alcohol withdrawal, rib contusion reported in December 2020 who presented to the urgent care facility today for left-sided rib pain after hitting his side on the kitchen counter.    Patient states that about a week ago, he hit his left side on the kitchen counter.  At the time he was reaching up to try to grab something from the cabinet, he fell onto his left side.  He had immediate pain to the area.  He states he has been having pain since then that feels like the pain he was having with his rib contusions in December.  States the pain right now is about a 2 out of 10.  Pain is worse when he presses down on the area.  He has been using acetaminophen for pain control.  Has not used any narcotics.  He denies any shortness of breath associated with his symptoms.  No cough.  No fevers or chills, abdominal pain, nausea or vomiting, diarrhea, changes in urination.      On review of records, it does seem that he was seen in the emergency room in December 2019 and diagnosed with a rib contusion.  X-rays at that time did not show evidence of fracture.    Patient was seen at outside urgent care where patient had significant hypokalemia to 2.1.  BMP was otherwise unremarkable.  CBC was also unremarkable.  He had a CT abdomen pelvis that showed subacute fractures of the left 10th through 12th ribs.  Also showed an area of decreased density or enhancement in the posterior head of the pancreas.  A call was placed to [facility] for transfer given his hypokalemia.    Past Medical History    I have reviewed this patient's medical history and updated it with pertinent information if needed.   Past Medical History:   Diagnosis Date     Falls       History of low potassium      Hypertension        Past Surgical History   I have reviewed this patient's surgical history and updated it with pertinent information if needed.  No past surgical history on file.    Prior to Admission Medications   Prior to Admission Medications   Prescriptions Last Dose Informant Patient Reported? Taking?   acetaminophen (TYLENOL) 325 MG tablet 5/23/2020 at Unknown time  No Yes   Sig: Take 2 tablets (650 mg) by mouth every 4 hours as needed for mild pain   folic acid (FOLVITE) 1 MG tablet Past Week at Unknown time  Yes Yes   Sig: Take 1 mg by mouth daily   ibuprofen (ADVIL/MOTRIN) 200 MG tablet Past Week at Unknown time  Yes Yes   Sig: Take 600 mg by mouth every 8 hours as needed for mild pain   lisinopril (ZESTRIL) 20 MG tablet 5/23/2020 at Unknown time  No Yes   Sig: Take 1 tablet (20 mg) by mouth daily   metoprolol succinate ER (TOPROL-XL) 50 MG 24 hr tablet 5/22/2020 at Unknown time  No Yes   Sig: Take 1 tablet (50 mg) by mouth every evening   potassium chloride (KLOR-CON) 20 MEQ packet 5/23/2020 at am  No Yes   Sig: Take 20 mEq by mouth 2 times daily   traZODone (DESYREL) 50 MG tablet 5/22/2020 at hs  No Yes   Sig: Take 1 tablet (50 mg) by mouth At Bedtime      Facility-Administered Medications: None     Allergies   Allergies   Allergen Reactions     Hydrocodone        Social History   I have reviewed this patient's social history and updated it with pertinent information if needed. Chalo Freire  reports that he has never smoked. He has never used smokeless tobacco. He reports current alcohol use. He reports that he does not use drugs.    Family History   I have reviewed this patient's family history and updated it with pertinent information if needed.   Noncontributory    Review of Systems   The 10 point Review of Systems is negative other than noted in the HPI or here.     Physical Exam   Temp: 96.8  F (36  C) Temp src: Oral BP: 137/80 Pulse: 82   Resp: 16 SpO2: 99 % O2  Device: None (Room air)    Vital Signs with Ranges  Temp:  [96.8  F (36  C)] 96.8  F (36  C)  Pulse:  [82] 82  Resp:  [16] 16  BP: (135-137)/(80-93) 137/80  SpO2:  [99 %] 99 %  136 lbs 4.8 oz    Constitutional: Nontoxic, NAD  HEENT: Normocephalic, mucous membranes moist.  No signs of trauma.  Respiratory: Normal work of breathing, clear bilaterally.  Good air movement.  No wheezes or crackles  Cardiovascular: Regular, no murmur noted.  Positive tenderness to palpation along the left chest wall.  GI: Bowel sounds present, nontender nondistended  Lymph/Hematologic: No cervical lymphadenopathy  Skin: No rash  Musculoskeletal: Normal tone  Neurologic: Cranial nerves II through XII intact, moves all extremities.  No tremors noted.  Psychiatric: Calm and cooperative    Data   Data reviewed today:  I personally reviewed from [facility]  CT C/A/P  1.  Subacute fractures of the left 10th through 12th ribs.  2.  No evidence of a splenic or other solid organ injury.  3.  Hepatic steatosis. Stable enhancing nodule in the left hepatic lobe.  4.  Area of decreased density or enhancement in the posterior head of the  pancreas is indeterminant. Sequelae from prior pancreatitis and a mass are both  in the differential diagnosis. Recommend gastroenterology consultation and  follow-up.    CXR Ribs:  The visualized heart and lungs are negative. Nondisplaced fracture  of the left 10th rib posterolaterally.    WBC 5.1, Hgb 12.9, Plat 159  Na 136, K 2.1, CO2 32, BUN 6, Cr 0.6    No lab results found in last 7 days.    No results found for this or any previous visit (from the past 24 hour(s)).

## 2020-05-23 NOTE — PLAN OF CARE
PRIMARY DIAGNOSIS: HYPOKALEMIA  OUTPATIENT/OBSERVATION GOALS TO BE MET BEFORE DISCHARGE:  1. ADLs back to baseline: Yes    2. Activity and level of assistance: Up with standby assistance.    3. Pain status: Improved with use of alternative comfort measures i.e.: repositioning      3. Return to near baseline physical activity: Yes, SBA      Discharge Planner Nurse   Safe discharge environment identified: Yes  Barriers to discharge: Yes, until medically cleared        Entered by: Adair Bynum 05/23/2020      Please review provider order for any additional goals.   Nurse to notify provider when observation goals have been met and patient is ready for discharge.    Patient is alert and orientated. Regular diet. SBA. Surgery consult. CIWA scale. Repeat labs. Vitamin infusion 100 ml/hr. Tele SR HR 65. Will continue to monitor and provide cares.

## 2020-05-23 NOTE — CONSULTS
Chief complaint:  Left chest wall pain    HPI:  This patient is a 45 year old male with a history of a left chest wall contusion in December who presents with left chest wall pain since falling on the same area about 1 week ago.  The patient has a history of alcohol abuse and withdrawal.  He was seen at an outside emergency room and was found to have 3 subacute rib fractures on the left.  These all showed evidence of callus.  The patient was also noted to have significant hypokalemia in the emergency room and was therefore admitted to the medicine service.  I have been asked to see the patient regarding his rib fractures.    Past Medical History:   has a past medical history of Falls, History of low potassium, and Hypertension.    Past Surgical History:  Denies significant past surgical history.     Social History:  Social History     Socioeconomic History     Marital status:      Spouse name: Not on file     Number of children: Not on file     Years of education: Not on file     Highest education level: Not on file   Occupational History     Not on file   Social Needs     Financial resource strain: Not on file     Food insecurity     Worry: Not on file     Inability: Not on file     Transportation needs     Medical: Not on file     Non-medical: Not on file   Tobacco Use     Smoking status: Never Smoker     Smokeless tobacco: Never Used   Substance and Sexual Activity     Alcohol use: Yes     Comment: last drink 6 days ago; was drinking 6pack beer daily     Drug use: No     Sexual activity: Yes   Lifestyle     Physical activity     Days per week: Not on file     Minutes per session: Not on file     Stress: Not on file   Relationships     Social connections     Talks on phone: Not on file     Gets together: Not on file     Attends Bahai service: Not on file     Active member of club or organization: Not on file     Attends meetings of clubs or organizations: Not on file     Relationship status: Not on file      Intimate partner violence     Fear of current or ex partner: Not on file     Emotionally abused: Not on file     Physically abused: Not on file     Forced sexual activity: Not on file   Other Topics Concern     Not on file   Social History Narrative     Not on file        Family History:  No family history on file.    Review of Systems:  The 10 point Review of Systems is negative other than noted in the HPI and above.    Physical Exam:  General - This is a well developed, well nourished male in no apparent distress.  HEENT - Normocephalic, atraumatic.  No scleral icterus.  Neck - supple without masses  Lungs - clear to ascultation.  Breath sounds are equal bilaterally.  The chest reveals left-sided tenderness without crepitus.  Heart - Regular rate & rhythm without murmur  Abdomen:  Soft and nontender.  Extremities - warm without edema  Neurologic - nonfocal  Glascow coma score is 15.    Relevant labs:  Potassium 2.1    Imaging:  Outside imaging showed 3 rib fractures with associated callus on the left side.  No evidence of pneumothorax.    Assessment and Plan:  This is a patient with left-sided rib pain with callus seen on CT scan.  My suspicion is that the patient had subclinical fractures back in December and then reinjured this area 1 week ago.  It is also possible that his fractures are 1 week old and that he is already developing some callus.  At this point, I see no need for any particular intervention.  I would simply treat with pain medication and muscle relaxants as needed.  He does not require any follow-up with surgery.      Jovanni Cruz MD  [facility]

## 2020-05-23 NOTE — PHARMACY-ADMISSION MEDICATION HISTORY
Admission medication history interview status for this patient is complete. See [facility] admission navigator for allergy information, prior to admission medications and immunization status.     Medication history interview done via telephone during Covid-19 pandemic, indicate source(s): Patient  Medication history resources (including written lists, pill bottles, clinic record):None    Changes made to PTA medication list:  Added: folic acid  Deleted: none  Changed: ibuprofen dosing    Actions taken by pharmacist (provider contacted, etc):None     Additional medication history information:None    Medication reconciliation/reorder completed by provider prior to medication history?  Y   (Y/N)     For patients on insulin therapy: N  (Y/N)    Prior to Admission medications    Medication Sig Last Dose Taking? Auth Provider   acetaminophen (TYLENOL) 325 MG tablet Take 2 tablets (650 mg) by mouth every 4 hours as needed for mild pain 5/23/2020 at Unknown time Yes Demarco Mcfadden MD   folic acid (FOLVITE) 1 MG tablet Take 1 mg by mouth daily Past Week at Unknown time Yes Unknown, Entered By History   ibuprofen (ADVIL/MOTRIN) 200 MG tablet Take 600 mg by mouth every 8 hours as needed for mild pain Past Week at Unknown time Yes Unknown, Entered By History   lisinopril (ZESTRIL) 20 MG tablet Take 1 tablet (20 mg) by mouth daily 5/23/2020 at Unknown time Yes Jered Vidal DO   metoprolol succinate ER (TOPROL-XL) 50 MG 24 hr tablet Take 1 tablet (50 mg) by mouth every evening 5/22/2020 at Unknown time Yes Jered Vidal DO   potassium chloride (KLOR-CON) 20 MEQ packet Take 20 mEq by mouth 2 times daily 5/23/2020 at am Yes Jered Vidal DO   traZODone (DESYREL) 50 MG tablet Take 1 tablet (50 mg) by mouth At Bedtime 5/22/2020 at hs Yes Jered Vidal DO

## 2020-05-23 NOTE — PLAN OF CARE
ROOM # [id number]    Living Situation (if not independent, order SW consult): lives with wife   Facility name:  : Wife damir [phone]    Activity level at baseline: ind  Activity level on admit: SBA       Patient registered to observation; given Patient Bill of Rights; given the opportunity to ask questions about observation status and their plan of care.  Patient has been oriented to the observation room, bathroom and call light is in place.    Discussed discharge goals and expectations with patient/family.

## 2020-05-24 VITALS
DIASTOLIC BLOOD PRESSURE: 90 MMHG | TEMPERATURE: 96.4 F | HEART RATE: 57 BPM | BODY MASS INDEX: 22 KG/M2 | OXYGEN SATURATION: 100 % | SYSTOLIC BLOOD PRESSURE: 161 MMHG | WEIGHT: 136.3 LBS | RESPIRATION RATE: 16 BRPM

## 2020-05-24 LAB
ANION GAP SERPL CALCULATED.3IONS-SCNC: 6 MMOL/L (ref 3–14)
BUN SERPL-MCNC: 4 MG/DL (ref 7–30)
CALCIUM SERPL-MCNC: 6.9 MG/DL (ref 8.5–10.1)
CHLORIDE SERPL-SCNC: 105 MMOL/L (ref 94–109)
CO2 SERPL-SCNC: 27 MMOL/L (ref 20–32)
CREAT SERPL-MCNC: 0.57 MG/DL (ref 0.66–1.25)
GFR SERPL CREATININE-BSD FRML MDRD: >90 ML/MIN/{1.73_M2}
GLUCOSE SERPL-MCNC: 88 MG/DL (ref 70–99)
MAGNESIUM SERPL-MCNC: 1.7 MG/DL (ref 1.6–2.3)
POTASSIUM SERPL-SCNC: 2.7 MMOL/L (ref 3.4–5.3)
POTASSIUM SERPL-SCNC: 3.7 MMOL/L (ref 3.4–5.3)
SODIUM SERPL-SCNC: 138 MMOL/L (ref 133–144)

## 2020-05-24 PROCEDURE — 83735 ASSAY OF MAGNESIUM: CPT | Performed by: HOSPITALIST

## 2020-05-24 PROCEDURE — 25000128 H RX IP 250 OP 636: Performed by: HOSPITALIST

## 2020-05-24 PROCEDURE — 96376 TX/PRO/DX INJ SAME DRUG ADON: CPT

## 2020-05-24 PROCEDURE — 25000132 ZZH RX MED GY IP 250 OP 250 PS 637: Performed by: HOSPITALIST

## 2020-05-24 PROCEDURE — 96361 HYDRATE IV INFUSION ADD-ON: CPT

## 2020-05-24 PROCEDURE — 36415 COLL VENOUS BLD VENIPUNCTURE: CPT | Performed by: HOSPITALIST

## 2020-05-24 PROCEDURE — 99217 ZZC OBSERVATION CARE DISCHARGE: CPT | Performed by: PHYSICIAN ASSISTANT

## 2020-05-24 PROCEDURE — 36415 COLL VENOUS BLD VENIPUNCTURE: CPT | Performed by: PHYSICIAN ASSISTANT

## 2020-05-24 PROCEDURE — 84132 ASSAY OF SERUM POTASSIUM: CPT | Mod: 91 | Performed by: PHYSICIAN ASSISTANT

## 2020-05-24 PROCEDURE — 80048 BASIC METABOLIC PNL TOTAL CA: CPT | Performed by: HOSPITALIST

## 2020-05-24 PROCEDURE — G0378 HOSPITAL OBSERVATION PER HR: HCPCS

## 2020-05-24 PROCEDURE — 83735 ASSAY OF MAGNESIUM: CPT | Performed by: PHYSICIAN ASSISTANT

## 2020-05-24 RX ORDER — POTASSIUM CHLORIDE 1.5 G/1.58G
20 POWDER, FOR SOLUTION ORAL 2 TIMES DAILY
Qty: 60 PACKET | Refills: 0 | COMMUNITY
Start: 2020-05-24

## 2020-05-24 RX ORDER — LIDOCAINE 4 G/G
1 PATCH TOPICAL EVERY 24 HOURS
Qty: 10 PATCH | Refills: 0 | Status: SHIPPED | OUTPATIENT
Start: 2020-05-24

## 2020-05-24 RX ADMIN — Medication 10 MEQ: at 01:51

## 2020-05-24 RX ADMIN — IBUPROFEN 600 MG: 600 TABLET ORAL at 04:14

## 2020-05-24 RX ADMIN — POTASSIUM CHLORIDE 40 MEQ: 1500 TABLET, EXTENDED RELEASE ORAL at 10:10

## 2020-05-24 RX ADMIN — FOLIC ACID 1 MG: 1 TABLET ORAL at 08:08

## 2020-05-24 RX ADMIN — Medication 10 MEQ: at 00:48

## 2020-05-24 RX ADMIN — MULTIPLE VITAMINS W/ MINERALS TAB 1 TABLET: TAB at 08:08

## 2020-05-24 RX ADMIN — THIAMINE HCL TAB 100 MG 100 MG: 100 TAB at 08:08

## 2020-05-24 RX ADMIN — LISINOPRIL 20 MG: 20 TABLET ORAL at 08:08

## 2020-05-24 RX ADMIN — ACETAMINOPHEN 650 MG: 325 TABLET, FILM COATED ORAL at 04:14

## 2020-05-24 RX ADMIN — POTASSIUM CHLORIDE 40 MEQ: 1500 TABLET, EXTENDED RELEASE ORAL at 08:12

## 2020-05-24 NOTE — PLAN OF CARE
PRIMARY DIAGNOSIS: Hypokalemia  OUTPATIENT/OBSERVATION GOALS TO BE MET BEFORE DISCHARGE:  ADLs back to baseline: Yes    Activity and level of assistance: Up with standby assistance for safety    Pain status: Improved-controlled with oral pain medications.    Return to near baseline physical activity: Yes     Discharge Planner Nurse   Safe discharge environment identified: Yes  Barriers to discharge: Yes       Entered by: Bam Rico 05/24/2020 12:21 AM    Vitals are Temp: 97.8  F (36.6  C) Temp src: Oral BP: (!) 129/92 Pulse: 59   Resp: 16 SpO2: 99 %.  Patient is alert and oriented x4. Up SBA. On a regular diet. Rated left side rib pain 2/10. Lidocaine patch in place. R. Arm PIV infusing vitamin fluids at 100 mL/hr.  L. Arm PIV infusing K+ replacement at 100 mL/hr. Denies dizziness, SOB, and nausea. On tele: SB 59 per tele tech. Scored 4 on CIWA scale. CMS intact. Stable and resting in bed. GI consulted. Lab recheck in AM. Will continue to monitor.       Please review provider order for any additional goals.   Nurse to notify provider when observation goals have been met and patient is ready for discharge.

## 2020-05-24 NOTE — PLAN OF CARE
PRIMARY DIAGNOSIS: ACUTE L SIDE PAIN   OUTPATIENT/OBSERVATION GOALS TO BE MET BEFORE DISCHARGE:  1. Pain Status: Pain free.    2. Return to near baseline physical activity: Yes    3. Cleared for discharge by consultants (if involved): No    Patient is alert and oriented x4. VSS. Denies pain. K 2.7 this AM, replaced. Will recheck K @ 1400. Regular diet. SL. GI consulted for pancreatic finding on CT.  GI recommended outpatient EUS in 4-6 weeks. Possible discharge after K recheck. Will continue to monitor.     Discharge Planner Nurse   Safe discharge environment identified: Yes  Barriers to discharge: No       Entered by: Luba Grimm 05/24/2020 12:00 PM     Please review provider order for any additional goals.   Nurse to notify provider when observation goals have been met and patient is ready for discharge.

## 2020-05-24 NOTE — CONSULTS
GASTROENTEROLOGY CONSULTATION     IMPRESSION: 45 year old male with history of alcohol abuse, who is currently admitted to observation with rib fractures.  He was incidentally found to have an area of decreased density or enhancement in the posterior head of the pancreas on CT, which has not been seen on prior imaging.  Although he has never been admitted for acute pancreatitis, he possibly had a mild case in 7/2018, when he was seen in the ED for abdominal pain, N/V, and found to have a lipase of 977 (not quite 3x ULN which is 393).  An US was performed then - visualized pancreas looked normal. No CT. Therefore, this may represent sequela of prior pancreatitis.  To further characterize, however, EUS is recommended. He will need to lay on his left side for this procedure, so would delay 4-6 weeks, until pain from left sided rib fractures has improved.    RECOMMENDATIONS:  -- Outpatient EUS in 4-6 weeks. I will arrange.      Thank you for this consult.  We will sign off.    ---------------------------------------------------------------------------------------------------------------------    REASON FOR CONSULT: Abnormal imaging - pancreas    HPI:  Chalo Freire is a 45 year old male with history of alcohol abuse and prior admission 1/2020 for alcohol withdrawal, who presented to the [facility] yesterday with left sided rib pain after hitting his side on the kitchen counter.  He had rib contusions in December after slipping and falling on the ice.  He had an Xray at that time, which was negative for a rib fracture, though the possibility of occult fracture was discussed.  Yesterday, imaging demonstrated subacute fractures of the left 10th-12th ribs.  Trauma surgery has seen him, suspecting subclinical fractures in December with reinjury one week ago.  No intervention was recommended.  He was admitted to observation for pain control.     GI is consulted, as he was incidentally found to have an area of decreased  density or enhancement in the posterior head of the pancreas.      He has never been hospitalized for acute pancreatitis, but someone has mentioned to him that there were signs of that.  On my review of prior lipases and imaging, he potentially had a mild episode in 7/2018.  At that time, he was seen in the ED in 7/25/2018 for abdominal pain, N/V.  Lipase at that time was 977 (not quite 3x ULN which is 393).  An US was performed then - visualized pancreas looked normal. No CT.     He reports mild periumbilical pain currently.  Bowel pattern is somewhat irregular at baseline - he has intermittent diarrhea - and this is unchanged.  No blood in his stools or black / tarry stools.  He has heartburn occasionally.  Clears his throat a lot. No dysphagia. No odynophagia.  No fevers, chills, night sweats. Weight overall stable, though up and down slightly.    ROS: A comprehensive ten point review of systems was negative aside from those in mentioned in the HPI.      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  For the last 4 months, reports having 1-2 drinks once / week    FAMILY HISTORY:  No FH of pancreatic pathology    PHYSICAL EXAM:   /85  (BP Location: Right arm)   Pulse 66   Temp 96.5  F (35.8  C) (Oral)   Resp 18   Wt 61.8 kg (136 lb 4.8 oz)   SpO2 100%   BMI 22.00 kg/m    General: awake, alert, well appearing  HEENT: mucous membranes moist, no scleral icterus, no conjunctival injection  Cardiovascular: normal rate  Chest: no accessory muscle use  Abdomen: soft, nontender, nondistended  Extremities: no edema  Skin: no rashes or lesions    LABORATORY DATA:   I reviewed the patient's new clinical lab test results.   Recent Labs   Lab Test 05/23/20 1837 03/23/20  0927 01/04/20  0746  01/02/20  1935  05/23/16  1923   WBC 5.7 5.9 3.8*   < > 4.2   < > 3.0*   HGB 12.8* 12.4* 8.9*   < > 12.1*   < > 14.7   * 98 105*   < > 101*   < > 96    131* 120*   < > 199   < > 167   INR  --   --   --   --  1.03  --  0.98    < > = values in this interval not displayed.     Recent Labs   Lab Test 05/24/20 0522 05/23/20 1837 03/23/20  0927   POTASSIUM 2.7* 2.4* 3.5   CHLORIDE 105 96 93*   CO2 27 31 28   BUN 4* 5* 13   ANIONGAP 6 7 11     Recent Labs   Lab Test 05/23/20 1837 03/23/20  0927 01/03/20  0644 01/02/20  2218  07/23/19  2220  07/21/19  1831 07/21/19  1558 07/25/18  1254   ALBUMIN 3.6 4.3 3.1*  --    < >  --    < >  --  4.0 4.6   BILITOTAL 1.8* 2.0* 0.9  --    < >  --    < >  --  2.5* 2.5*   * 84* 45  --    < >  --    < >  --  63 69   * 144* 162*  --    < >  --    < >  --  183* 102*   PROTEIN  --   --   --  20*  --  Negative  --  30*  --   --    LIPASE  --  223  --   --   --   --   --   --  255 977*    < > = values in this interval not displayed.       IMAGING:  EXAM: CT ABDOMEN PELVIS W  LOCATION: [facility] [place]  DATE/TIME: 5/23/2020 2:13 PM    INDICATION: fall, LUQ to L flank tenderness, rule out splenic renal injury  COMPARISON: 05/23/2016  TECHNIQUE: CT scan of the abdomen and pelvis was performed following injection  of IV contrast. Multiplanar reformats were obtained. Dose reduction techniques  were  used.  CONTRAST: IOPAMIDOL 300 MG/ML  ML BOTTLE: 100mL    FINDINGS:   LOWER CHEST: Normal.    HEPATOBILIARY: Marked hepatic steatosis. 7 mm enhancing nodule in segment 2 of  the left hepatic lobe, similar to previous.    PANCREAS: 2 x 1.2 cm focus of decreased density/enhancement in the posterior  body of the pancreas, series 3 image 51. No pancreatic or bile duct dilation.  Remainder of the pancreas is normal.    SPLEEN: Normal. No splenic laceration.    ADRENAL GLANDS: Normal.    KIDNEYS/BLADDER: Normal.    BOWEL: Normal.    LYMPH NODES: Normal.    VASCULATURE: Unremarkable.    PELVIC ORGANS: Normal.    MUSCULOSKELETAL: There are subacute fractures of the posterior left 10th, 11th,  and 12th ribs, with surrounding bony callus.    IMPRESSION:   1.  Subacute fractures of the left 10th through 12th ribs.  2.  No evidence of a splenic or other solid organ injury.  3.  Hepatic steatosis. Stable enhancing nodule in the left hepatic lobe.  4.  Area of decreased density or enhancement in the posterior head of the  pancreas is indeterminant. Sequelae from prior pancreatitis and a mass are both  in the differential diagnosis. Recommend gastroenterology consultation and  follow-up.      Lavern Cruz MD  [facility]  After 5pm: [phone]

## 2020-05-24 NOTE — PLAN OF CARE
PRIMARY DIAGNOSIS: Hypokalemia  OUTPATIENT/OBSERVATION GOALS TO BE MET BEFORE DISCHARGE:  1. ADLs back to baseline: Yes    2. Activity and level of assistance: Up with standby assistance for safety    3. Pain status: Improved-controlled with oral pain medications.    4. Return to near baseline physical activity: Yes     Discharge Planner Nurse   Safe discharge environment identified: Yes  Barriers to discharge: Yes       Entered by: Bam Rico 05/24/2020 4:19 AM    Vitals are Temp: 96.9  F (36.1  C) Temp src: Oral BP: (!) 137/93 Pulse: 51   Resp: 16 SpO2: 99 %.  Patient is alert and oriented x4. Up SBA. On a regular diet. PIV x2 SL. Denies dizziness, SOB, and nausea. Pt reported one loose stool and complaining of 10/10 abdominal pain. PRN Tylenol and ibuprofen given. Lidocaine patch in place to left rib cage. On tele: SB in 50s per tele tech. Scored 4 on CIWA scale. CMS intact. GI consulted. IV K+ replacement completed. Lab recheck in AM. Stable and resting in bed. Will continue to monitor and provide supportive cares.        Please review provider order for any additional goals.   Nurse to notify provider when observation goals have been met and patient is ready for discharge.

## 2020-05-24 NOTE — PROGRESS NOTES
Patient's After Visit Summary was reviewed with patient   Patient verbalized understanding of After Visit Summary, recommended follow up and was given an opportunity to ask questions.   Discharge medications sent home with patient/family: YES   Discharged with other:zach

## 2020-05-24 NOTE — PLAN OF CARE
PRIMARY DIAGNOSIS: HYPOKALEMIA  OUTPATIENT/OBSERVATION GOALS TO BE MET BEFORE DISCHARGE:  1. ADLs back to baseline: Yes    2. Activity and level of assistance: Up with standby assistance.    3. Pain status: Improved with use of alternative comfort measures i.e.: repositioning. Tylenol given and lido patch in place      4. Return to near baseline physical activity: Yes, SBA      Discharge Planner Nurse   Safe discharge environment identified: Yes  Barriers to discharge: Yes, until medically cleared        Entered by: Adair Bynum 05/23/2020      Please review provider order for any additional goals.   Nurse to notify provider when observation goals have been met and patient is ready for discharge.    Patient is alert and orientated. Regular diet. SBA. IV K+ running for replacement. Surgery consult complete. GI consult placed. CIWA scale. Repeat labs. Vitamin infusion 100 ml/hr. Tele SR HR 67. Will continue to monitor and provide cares.

## 2020-05-24 NOTE — PLAN OF CARE
PRIMARY DIAGNOSIS: ACUTE L SIDE PAIN   OUTPATIENT/OBSERVATION GOALS TO BE MET BEFORE DISCHARGE:  1. Pain Status: Pain free.    2. Return to near baseline physical activity: Yes    3. Cleared for discharge by consultants (if involved): No    Patient is alert and oriented x4. VSS. Denies pain. K 2.7 this AM, currently replacing. Will recheck @ 1400. Regular diet. SL. GI consulted for pancreatic finding on CT. Will continue to monitor.     Discharge Planner Nurse   Safe discharge environment identified: Yes  Barriers to discharge: No       Entered by: Luba Grimm 05/24/2020 9:27 AM     Please review provider order for any additional goals.   Nurse to notify provider when observation goals have been met and patient is ready for discharge.

## 2020-05-28 LAB — INTERPRETATION ECG - MUSE: NORMAL

## 2020-11-22 ENCOUNTER — HEALTH MAINTENANCE LETTER (OUTPATIENT)
Age: 45
End: 2020-11-22

## 2020-12-22 ENCOUNTER — OFFICE VISIT (OUTPATIENT)
Dept: URGENT CARE | Facility: URGENT CARE | Age: 45
End: 2020-12-22

## 2020-12-22 VITALS
WEIGHT: 136 LBS | HEART RATE: 109 BPM | SYSTOLIC BLOOD PRESSURE: 159 MMHG | BODY MASS INDEX: 21.95 KG/M2 | OXYGEN SATURATION: 100 % | DIASTOLIC BLOOD PRESSURE: 92 MMHG | TEMPERATURE: 99.2 F

## 2020-12-22 DIAGNOSIS — R00.2 PALPITATIONS: ICD-10-CM

## 2020-12-22 DIAGNOSIS — N39.0 URINARY TRACT INFECTION WITHOUT HEMATURIA, SITE UNSPECIFIED: Primary | ICD-10-CM

## 2020-12-22 DIAGNOSIS — D53.9 MACROCYTIC ANEMIA: ICD-10-CM

## 2020-12-22 DIAGNOSIS — F10.21 HISTORY OF ALCOHOL DEPENDENCE (H): ICD-10-CM

## 2020-12-22 DIAGNOSIS — R53.81 MALAISE: ICD-10-CM

## 2020-12-22 LAB
ANION GAP SERPL CALCULATED.3IONS-SCNC: 5 MMOL/L (ref 3–14)
BUN SERPL-MCNC: 7 MG/DL (ref 7–30)
CALCIUM SERPL-MCNC: 9 MG/DL (ref 8.5–10.1)
CHLORIDE SERPL-SCNC: 107 MMOL/L (ref 94–109)
CO2 SERPL-SCNC: 28 MMOL/L (ref 20–32)
CREAT SERPL-MCNC: 0.9 MG/DL (ref 0.66–1.25)
ERYTHROCYTE [DISTWIDTH] IN BLOOD BY AUTOMATED COUNT: 12.6 % (ref 10–15)
GFR SERPL CREATININE-BSD FRML MDRD: >90 ML/MIN/{1.73_M2}
GLUCOSE SERPL-MCNC: 95 MG/DL (ref 70–99)
HCT VFR BLD AUTO: 39 % (ref 40–53)
HGB BLD-MCNC: 13.2 G/DL (ref 13.3–17.7)
MCH RBC QN AUTO: 35.3 PG (ref 26.5–33)
MCHC RBC AUTO-ENTMCNC: 33.8 G/DL (ref 31.5–36.5)
MCV RBC AUTO: 104 FL (ref 78–100)
MUCOUS THREADS #/AREA URNS LPF: PRESENT /LPF
NON-SQ EPI CELLS #/AREA URNS LPF: ABNORMAL /LPF
PLATELET # BLD AUTO: 164 10E9/L (ref 150–450)
POTASSIUM SERPL-SCNC: 3.6 MMOL/L (ref 3.4–5.3)
RBC # BLD AUTO: 3.74 10E12/L (ref 4.4–5.9)
RBC #/AREA URNS AUTO: ABNORMAL /HPF
SODIUM SERPL-SCNC: 140 MMOL/L (ref 133–144)
URNS CMNT MICRO: ABNORMAL
WBC # BLD AUTO: 8.8 10E9/L (ref 4–11)
WBC #/AREA URNS AUTO: ABNORMAL /HPF

## 2020-12-22 PROCEDURE — 85027 COMPLETE CBC AUTOMATED: CPT | Performed by: FAMILY MEDICINE

## 2020-12-22 PROCEDURE — 99204 OFFICE O/P NEW MOD 45 MIN: CPT | Performed by: FAMILY MEDICINE

## 2020-12-22 PROCEDURE — 93000 ELECTROCARDIOGRAM COMPLETE: CPT | Performed by: FAMILY MEDICINE

## 2020-12-22 PROCEDURE — 81015 MICROSCOPIC EXAM OF URINE: CPT | Performed by: FAMILY MEDICINE

## 2020-12-22 PROCEDURE — 80048 BASIC METABOLIC PNL TOTAL CA: CPT | Performed by: FAMILY MEDICINE

## 2020-12-22 PROCEDURE — 84443 ASSAY THYROID STIM HORMONE: CPT | Performed by: FAMILY MEDICINE

## 2020-12-22 PROCEDURE — 36415 COLL VENOUS BLD VENIPUNCTURE: CPT | Performed by: FAMILY MEDICINE

## 2020-12-22 RX ORDER — CEPHALEXIN 500 MG/1
500 CAPSULE ORAL 2 TIMES DAILY
Qty: 14 CAPSULE | Refills: 0 | Status: SHIPPED | OUTPATIENT
Start: 2020-12-22 | End: 2020-12-29

## 2020-12-22 ASSESSMENT — ENCOUNTER SYMPTOMS
FEVER: 0
FATIGUE: 1
POLYPHAGIA: 0
TREMORS: 1
SORE THROAT: 0
SHORTNESS OF BREATH: 0
COUGH: 0
ABDOMINAL PAIN: 0
CONFUSION: 1
SPEECH DIFFICULTY: 0
MYALGIAS: 1
DIZZINESS: 0
DYSURIA: 0
TROUBLE SWALLOWING: 0
POLYDIPSIA: 0
FACIAL ASYMMETRY: 0
PALPITATIONS: 1
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
## 2020-12-22 NOTE — PATIENT INSTRUCTIONS
Cephalexin 500 mg twice a day to fight the bacteria causing your bladder infection.  Be sure to finish all the pills in this prescription even if you start to feel better.    Drink plenty of water.    Potassium level right now is normal.    If symptoms are worsening overnight, go to the emergency room.

## 2020-12-23 LAB — TSH SERPL DL<=0.005 MIU/L-ACNC: 2.17 MU/L (ref 0.4–4)

## 2021-04-18 ENCOUNTER — APPOINTMENT (OUTPATIENT)
Dept: GENERAL RADIOLOGY | Facility: CLINIC | Age: 46
DRG: 896 | End: 2021-04-18
Attending: EMERGENCY MEDICINE

## 2021-04-18 ENCOUNTER — APPOINTMENT (OUTPATIENT)
Dept: CT IMAGING | Facility: CLINIC | Age: 46
DRG: 896 | End: 2021-04-18
Attending: EMERGENCY MEDICINE

## 2021-04-18 ENCOUNTER — HOSPITAL ENCOUNTER (INPATIENT)
Facility: CLINIC | Age: 46
LOS: 3 days | Discharge: HOME | DRG: 896 | End: 2021-04-21
Attending: EMERGENCY MEDICINE | Admitting: INTERNAL MEDICINE

## 2021-04-18 DIAGNOSIS — F10.930 ALCOHOL WITHDRAWAL SEIZURE WITHOUT COMPLICATION (H): ICD-10-CM

## 2021-04-18 DIAGNOSIS — R56.9 ALCOHOL WITHDRAWAL SEIZURE WITHOUT COMPLICATION (H): ICD-10-CM

## 2021-04-18 DIAGNOSIS — E87.6 HYPOKALEMIA: ICD-10-CM

## 2021-04-18 DIAGNOSIS — S01.512A LACERATION OF TONGUE, INITIAL ENCOUNTER: ICD-10-CM

## 2021-04-18 DIAGNOSIS — E83.42 HYPOMAGNESEMIA: ICD-10-CM

## 2021-04-18 LAB
ALBUMIN SERPL-MCNC: 3.5 G/DL (ref 3.4–5)
ALP SERPL-CCNC: 139 U/L (ref 40–150)
ALT SERPL W P-5'-P-CCNC: 70 U/L (ref 0–70)
ANION GAP SERPL CALCULATED.3IONS-SCNC: 21 MMOL/L (ref 3–14)
AST SERPL W P-5'-P-CCNC: 196 U/L (ref 0–45)
BASOPHILS # BLD AUTO: 0 10E9/L (ref 0–0.2)
BASOPHILS NFR BLD AUTO: 0.3 %
BILIRUB SERPL-MCNC: 2.8 MG/DL (ref 0.2–1.3)
BUN SERPL-MCNC: 3 MG/DL (ref 7–30)
CALCIUM SERPL-MCNC: 8.3 MG/DL (ref 8.5–10.1)
CHLORIDE SERPL-SCNC: 99 MMOL/L (ref 94–109)
CO2 SERPL-SCNC: 18 MMOL/L (ref 20–32)
CREAT SERPL-MCNC: 0.42 MG/DL (ref 0.66–1.25)
DIFFERENTIAL METHOD BLD: ABNORMAL
EOSINOPHIL # BLD AUTO: 0 10E9/L (ref 0–0.7)
EOSINOPHIL NFR BLD AUTO: 0 %
ERYTHROCYTE [DISTWIDTH] IN BLOOD BY AUTOMATED COUNT: 13.1 % (ref 10–15)
ETHANOL SERPL-MCNC: <0.01 G/DL
FLUAV RNA RESP QL NAA+PROBE: NEGATIVE
FLUBV RNA RESP QL NAA+PROBE: NEGATIVE
GFR SERPL CREATININE-BSD FRML MDRD: >90 ML/MIN/{1.73_M2}
GLUCOSE SERPL-MCNC: 179 MG/DL (ref 70–99)
HCT VFR BLD AUTO: 35.9 % (ref 40–53)
HGB BLD-MCNC: 12.1 G/DL (ref 13.3–17.7)
IMM GRANULOCYTES # BLD: 0.1 10E9/L (ref 0–0.4)
IMM GRANULOCYTES NFR BLD: 1 %
INR PPP: 1.27 (ref 0.86–1.14)
LABORATORY COMMENT REPORT: NORMAL
LIPASE SERPL-CCNC: 164 U/L (ref 73–393)
LYMPHOCYTES # BLD AUTO: 0.2 10E9/L (ref 0.8–5.3)
LYMPHOCYTES NFR BLD AUTO: 4.1 %
MAGNESIUM SERPL-MCNC: 1.1 MG/DL (ref 1.6–2.3)
MCH RBC QN AUTO: 35.9 PG (ref 26.5–33)
MCHC RBC AUTO-ENTMCNC: 33.7 G/DL (ref 31.5–36.5)
MCV RBC AUTO: 107 FL (ref 78–100)
MONOCYTES # BLD AUTO: 0.4 10E9/L (ref 0–1.3)
MONOCYTES NFR BLD AUTO: 6.1 %
NEUTROPHILS # BLD AUTO: 5.2 10E9/L (ref 1.6–8.3)
NEUTROPHILS NFR BLD AUTO: 88.5 %
NRBC # BLD AUTO: 0 10*3/UL
NRBC BLD AUTO-RTO: 0 /100
PLATELET # BLD AUTO: 139 10E9/L (ref 150–450)
POTASSIUM SERPL-SCNC: 2.5 MMOL/L (ref 3.4–5.3)
PROT SERPL-MCNC: 8 G/DL (ref 6.8–8.8)
RBC # BLD AUTO: 3.37 10E12/L (ref 4.4–5.9)
RSV RNA SPEC QL NAA+PROBE: NORMAL
SARS-COV-2 RNA RESP QL NAA+PROBE: NEGATIVE
SODIUM SERPL-SCNC: 138 MMOL/L (ref 133–144)
SPECIMEN SOURCE: NORMAL
TROPONIN I SERPL-MCNC: <0.015 UG/L (ref 0–0.04)
WBC # BLD AUTO: 5.9 10E9/L (ref 4–11)

## 2021-04-18 PROCEDURE — 99285 EMERGENCY DEPT VISIT HI MDM: CPT | Mod: 25

## 2021-04-18 PROCEDURE — C9803 HOPD COVID-19 SPEC COLLECT: HCPCS

## 2021-04-18 PROCEDURE — HZ2ZZZZ DETOXIFICATION SERVICES FOR SUBSTANCE ABUSE TREATMENT: ICD-10-PCS | Performed by: INTERNAL MEDICINE

## 2021-04-18 PROCEDURE — 99223 1ST HOSP IP/OBS HIGH 75: CPT | Mod: AI | Performed by: INTERNAL MEDICINE

## 2021-04-18 PROCEDURE — 96375 TX/PRO/DX INJ NEW DRUG ADDON: CPT

## 2021-04-18 PROCEDURE — 85610 PROTHROMBIN TIME: CPT | Performed by: EMERGENCY MEDICINE

## 2021-04-18 PROCEDURE — 250N000011 HC RX IP 250 OP 636: Performed by: EMERGENCY MEDICINE

## 2021-04-18 PROCEDURE — 120N000001 HC R&B MED SURG/OB

## 2021-04-18 PROCEDURE — 96361 HYDRATE IV INFUSION ADD-ON: CPT

## 2021-04-18 PROCEDURE — 71045 X-RAY EXAM CHEST 1 VIEW: CPT

## 2021-04-18 PROCEDURE — 80053 COMPREHEN METABOLIC PANEL: CPT | Performed by: EMERGENCY MEDICINE

## 2021-04-18 PROCEDURE — 83735 ASSAY OF MAGNESIUM: CPT | Performed by: EMERGENCY MEDICINE

## 2021-04-18 PROCEDURE — 93005 ELECTROCARDIOGRAM TRACING: CPT

## 2021-04-18 PROCEDURE — 200N000001 HC R&B ICU

## 2021-04-18 PROCEDURE — 250N000013 HC RX MED GY IP 250 OP 250 PS 637: Performed by: EMERGENCY MEDICINE

## 2021-04-18 PROCEDURE — 70450 CT HEAD/BRAIN W/O DYE: CPT

## 2021-04-18 PROCEDURE — 83690 ASSAY OF LIPASE: CPT | Performed by: EMERGENCY MEDICINE

## 2021-04-18 PROCEDURE — 85025 COMPLETE CBC W/AUTO DIFF WBC: CPT | Performed by: EMERGENCY MEDICINE

## 2021-04-18 PROCEDURE — 258N000003 HC RX IP 258 OP 636: Performed by: EMERGENCY MEDICINE

## 2021-04-18 PROCEDURE — 96365 THER/PROPH/DIAG IV INF INIT: CPT

## 2021-04-18 PROCEDURE — 82077 ASSAY SPEC XCP UR&BREATH IA: CPT | Performed by: EMERGENCY MEDICINE

## 2021-04-18 PROCEDURE — 87636 SARSCOV2 & INF A&B AMP PRB: CPT | Performed by: EMERGENCY MEDICINE

## 2021-04-18 PROCEDURE — 84484 ASSAY OF TROPONIN QUANT: CPT | Performed by: EMERGENCY MEDICINE

## 2021-04-18 RX ORDER — MAGNESIUM SULFATE HEPTAHYDRATE 40 MG/ML
2 INJECTION, SOLUTION INTRAVENOUS ONCE
Status: COMPLETED | OUTPATIENT
Start: 2021-04-18 | End: 2021-04-18

## 2021-04-18 RX ORDER — ONDANSETRON 2 MG/ML
4 INJECTION INTRAMUSCULAR; INTRAVENOUS EVERY 30 MIN PRN
Status: DISCONTINUED | OUTPATIENT
Start: 2021-04-18 | End: 2021-04-21 | Stop reason: HOSPADM

## 2021-04-18 RX ORDER — LORAZEPAM 2 MG/ML
1 INJECTION INTRAMUSCULAR ONCE
Status: COMPLETED | OUTPATIENT
Start: 2021-04-18 | End: 2021-04-18

## 2021-04-18 RX ORDER — POTASSIUM CHLORIDE 1500 MG/1
40 TABLET, EXTENDED RELEASE ORAL ONCE
Status: COMPLETED | OUTPATIENT
Start: 2021-04-18 | End: 2021-04-18

## 2021-04-18 RX ADMIN — POTASSIUM CHLORIDE 40 MEQ: 1500 TABLET, EXTENDED RELEASE ORAL at 22:06

## 2021-04-18 RX ADMIN — SODIUM CHLORIDE 1000 ML: 9 INJECTION, SOLUTION INTRAVENOUS at 20:33

## 2021-04-18 RX ADMIN — LORAZEPAM 1 MG: 2 INJECTION INTRAMUSCULAR; INTRAVENOUS at 22:06

## 2021-04-18 RX ADMIN — ONDANSETRON 4 MG: 2 INJECTION INTRAMUSCULAR; INTRAVENOUS at 20:40

## 2021-04-18 RX ADMIN — MAGNESIUM SULFATE HEPTAHYDRATE 2 G: 2 INJECTION, SOLUTION INTRAVENOUS at 22:07

## 2021-04-18 ASSESSMENT — ENCOUNTER SYMPTOMS
TREMORS: 1
BACK PAIN: 0
SHORTNESS OF BREATH: 0
DECREASED CONCENTRATION: 0
HEADACHES: 0
RHINORRHEA: 0
SEIZURES: 1
NECK PAIN: 0
ABDOMINAL PAIN: 1
CONFUSION: 1
FEVER: 0
VOMITING: 0
WEAKNESS: 1
COUGH: 1
NAUSEA: 0

## 2021-04-19 ENCOUNTER — APPOINTMENT (OUTPATIENT)
Dept: ULTRASOUND IMAGING | Facility: CLINIC | Age: 46
DRG: 896 | End: 2021-04-19
Attending: INTERNAL MEDICINE

## 2021-04-19 LAB
ALBUMIN SERPL-MCNC: 3.2 G/DL (ref 3.4–5)
ALP SERPL-CCNC: 127 U/L (ref 40–150)
ALT SERPL W P-5'-P-CCNC: 77 U/L (ref 0–70)
AMPHETAMINES UR QL SCN: NEGATIVE
ANION GAP SERPL CALCULATED.3IONS-SCNC: 8 MMOL/L (ref 3–14)
AST SERPL W P-5'-P-CCNC: 251 U/L (ref 0–45)
BARBITURATES UR QL: NEGATIVE
BASOPHILS # BLD AUTO: 0 10E9/L (ref 0–0.2)
BASOPHILS NFR BLD AUTO: 0.4 %
BENZODIAZ UR QL: NEGATIVE
BILIRUB SERPL-MCNC: 3.1 MG/DL (ref 0.2–1.3)
BUN SERPL-MCNC: 1 MG/DL (ref 7–30)
CALCIUM SERPL-MCNC: 8 MG/DL (ref 8.5–10.1)
CANNABINOIDS UR QL SCN: NEGATIVE
CHLORIDE SERPL-SCNC: 102 MMOL/L (ref 94–109)
CO2 SERPL-SCNC: 28 MMOL/L (ref 20–32)
COCAINE UR QL: NEGATIVE
CREAT SERPL-MCNC: 0.39 MG/DL (ref 0.66–1.25)
DIFFERENTIAL METHOD BLD: ABNORMAL
EOSINOPHIL # BLD AUTO: 0 10E9/L (ref 0–0.7)
EOSINOPHIL NFR BLD AUTO: 0 %
ERYTHROCYTE [DISTWIDTH] IN BLOOD BY AUTOMATED COUNT: 13 % (ref 10–15)
GFR SERPL CREATININE-BSD FRML MDRD: >90 ML/MIN/{1.73_M2}
GLUCOSE BLDC GLUCOMTR-MCNC: 114 MG/DL (ref 70–99)
GLUCOSE BLDC GLUCOMTR-MCNC: 120 MG/DL (ref 70–99)
GLUCOSE BLDC GLUCOMTR-MCNC: 123 MG/DL (ref 70–99)
GLUCOSE SERPL-MCNC: 120 MG/DL (ref 70–99)
HCT VFR BLD AUTO: 36.9 % (ref 40–53)
HGB BLD-MCNC: 12.6 G/DL (ref 13.3–17.7)
IMM GRANULOCYTES # BLD: 0.1 10E9/L (ref 0–0.4)
IMM GRANULOCYTES NFR BLD: 0.8 %
INTERPRETATION ECG - MUSE: NORMAL
LYMPHOCYTES # BLD AUTO: 1 10E9/L (ref 0.8–5.3)
LYMPHOCYTES NFR BLD AUTO: 12.4 %
MAGNESIUM SERPL-MCNC: 1.8 MG/DL (ref 1.6–2.3)
MCH RBC QN AUTO: 35.9 PG (ref 26.5–33)
MCHC RBC AUTO-ENTMCNC: 34.1 G/DL (ref 31.5–36.5)
MCV RBC AUTO: 105 FL (ref 78–100)
MONOCYTES # BLD AUTO: 0.7 10E9/L (ref 0–1.3)
MONOCYTES NFR BLD AUTO: 9.3 %
NEUTROPHILS # BLD AUTO: 6.2 10E9/L (ref 1.6–8.3)
NEUTROPHILS NFR BLD AUTO: 77.1 %
NRBC # BLD AUTO: 0 10*3/UL
NRBC BLD AUTO-RTO: 0 /100
OPIATES UR QL SCN: NEGATIVE
PCP UR QL SCN: NEGATIVE
PHOSPHATE SERPL-MCNC: 1.9 MG/DL (ref 2.5–4.5)
PHOSPHATE SERPL-MCNC: 2.3 MG/DL (ref 2.5–4.5)
PLATELET # BLD AUTO: 113 10E9/L (ref 150–450)
POTASSIUM SERPL-SCNC: 2.9 MMOL/L (ref 3.4–5.3)
POTASSIUM SERPL-SCNC: 3.1 MMOL/L (ref 3.4–5.3)
POTASSIUM SERPL-SCNC: 3.2 MMOL/L (ref 3.4–5.3)
POTASSIUM SERPL-SCNC: 3.2 MMOL/L (ref 3.4–5.3)
POTASSIUM SERPL-SCNC: 3.5 MMOL/L (ref 3.4–5.3)
PROT SERPL-MCNC: 7.5 G/DL (ref 6.8–8.8)
RBC # BLD AUTO: 3.51 10E12/L (ref 4.4–5.9)
SODIUM SERPL-SCNC: 138 MMOL/L (ref 133–144)
WBC # BLD AUTO: 8 10E9/L (ref 4–11)

## 2021-04-19 PROCEDURE — 36415 COLL VENOUS BLD VENIPUNCTURE: CPT | Performed by: INTERNAL MEDICINE

## 2021-04-19 PROCEDURE — 999N001017 HC STATISTIC GLUCOSE BY METER IP

## 2021-04-19 PROCEDURE — 90471 IMMUNIZATION ADMIN: CPT | Performed by: EMERGENCY MEDICINE

## 2021-04-19 PROCEDURE — 84100 ASSAY OF PHOSPHORUS: CPT | Performed by: INTERNAL MEDICINE

## 2021-04-19 PROCEDURE — 250N000011 HC RX IP 250 OP 636: Performed by: INTERNAL MEDICINE

## 2021-04-19 PROCEDURE — 250N000013 HC RX MED GY IP 250 OP 250 PS 637: Performed by: INTERNAL MEDICINE

## 2021-04-19 PROCEDURE — 80307 DRUG TEST PRSMV CHEM ANLYZR: CPT | Performed by: INTERNAL MEDICINE

## 2021-04-19 PROCEDURE — 258N000001 HC RX 258: Performed by: INTERNAL MEDICINE

## 2021-04-19 PROCEDURE — 258N000003 HC RX IP 258 OP 636: Performed by: INTERNAL MEDICINE

## 2021-04-19 PROCEDURE — 84132 ASSAY OF SERUM POTASSIUM: CPT | Performed by: INTERNAL MEDICINE

## 2021-04-19 PROCEDURE — 80053 COMPREHEN METABOLIC PANEL: CPT | Performed by: INTERNAL MEDICINE

## 2021-04-19 PROCEDURE — 83735 ASSAY OF MAGNESIUM: CPT | Performed by: INTERNAL MEDICINE

## 2021-04-19 PROCEDURE — 120N000001 HC R&B MED SURG/OB

## 2021-04-19 PROCEDURE — 85025 COMPLETE CBC W/AUTO DIFF WBC: CPT | Performed by: INTERNAL MEDICINE

## 2021-04-19 PROCEDURE — 250N000011 HC RX IP 250 OP 636: Performed by: EMERGENCY MEDICINE

## 2021-04-19 PROCEDURE — 250N000009 HC RX 250: Performed by: INTERNAL MEDICINE

## 2021-04-19 PROCEDURE — 90715 TDAP VACCINE 7 YRS/> IM: CPT | Performed by: EMERGENCY MEDICINE

## 2021-04-19 PROCEDURE — 99232 SBSQ HOSP IP/OBS MODERATE 35: CPT | Performed by: INTERNAL MEDICINE

## 2021-04-19 PROCEDURE — 76705 ECHO EXAM OF ABDOMEN: CPT

## 2021-04-19 RX ORDER — ONDANSETRON 2 MG/ML
4 INJECTION INTRAMUSCULAR; INTRAVENOUS EVERY 6 HOURS PRN
Status: DISCONTINUED | OUTPATIENT
Start: 2021-04-19 | End: 2021-04-21 | Stop reason: HOSPADM

## 2021-04-19 RX ORDER — LANOLIN ALCOHOL/MO/W.PET/CERES
200 CREAM (GRAM) TOPICAL 3 TIMES DAILY
Status: COMPLETED | OUTPATIENT
Start: 2021-04-19 | End: 2021-04-20

## 2021-04-19 RX ORDER — ACETAMINOPHEN 325 MG/1
650 TABLET ORAL EVERY 4 HOURS PRN
Status: DISCONTINUED | OUTPATIENT
Start: 2021-04-19 | End: 2021-04-21 | Stop reason: HOSPADM

## 2021-04-19 RX ORDER — IBUPROFEN 400 MG/1
400 TABLET, FILM COATED ORAL EVERY 6 HOURS PRN
Status: DISCONTINUED | OUTPATIENT
Start: 2021-04-19 | End: 2021-04-21 | Stop reason: HOSPADM

## 2021-04-19 RX ORDER — FOLIC ACID 1 MG/1
1 TABLET ORAL DAILY
Status: DISCONTINUED | OUTPATIENT
Start: 2021-04-19 | End: 2021-04-21 | Stop reason: HOSPADM

## 2021-04-19 RX ORDER — PROCHLORPERAZINE MALEATE 10 MG
10 TABLET ORAL EVERY 6 HOURS PRN
Status: DISCONTINUED | OUTPATIENT
Start: 2021-04-19 | End: 2021-04-21 | Stop reason: HOSPADM

## 2021-04-19 RX ORDER — TRAZODONE HYDROCHLORIDE 50 MG/1
50 TABLET, FILM COATED ORAL AT BEDTIME
Status: ON HOLD | COMMUNITY
End: 2021-04-21

## 2021-04-19 RX ORDER — DIAZEPAM 10 MG/2ML
5-10 INJECTION, SOLUTION INTRAMUSCULAR; INTRAVENOUS EVERY 30 MIN PRN
Status: DISCONTINUED | OUTPATIENT
Start: 2021-04-19 | End: 2021-04-19

## 2021-04-19 RX ORDER — FLUMAZENIL 0.1 MG/ML
0.2 INJECTION, SOLUTION INTRAVENOUS
Status: DISCONTINUED | OUTPATIENT
Start: 2021-04-19 | End: 2021-04-21 | Stop reason: HOSPADM

## 2021-04-19 RX ORDER — GABAPENTIN 100 MG/1
100 CAPSULE ORAL EVERY 8 HOURS
Status: DISCONTINUED | OUTPATIENT
Start: 2021-04-26 | End: 2021-04-21 | Stop reason: HOSPADM

## 2021-04-19 RX ORDER — LORAZEPAM 2 MG/ML
1-2 INJECTION INTRAMUSCULAR EVERY 30 MIN PRN
Status: DISCONTINUED | OUTPATIENT
Start: 2021-04-19 | End: 2021-04-21 | Stop reason: HOSPADM

## 2021-04-19 RX ORDER — FOLIC ACID 1 MG/1
1 TABLET ORAL DAILY
Status: ON HOLD | COMMUNITY
End: 2021-04-21

## 2021-04-19 RX ORDER — CLONIDINE HYDROCHLORIDE 0.1 MG/1
0.1 TABLET ORAL EVERY 8 HOURS
Status: DISCONTINUED | OUTPATIENT
Start: 2021-04-19 | End: 2021-04-19

## 2021-04-19 RX ORDER — LIDOCAINE 40 MG/G
CREAM TOPICAL
Status: DISCONTINUED | OUTPATIENT
Start: 2021-04-19 | End: 2021-04-21 | Stop reason: HOSPADM

## 2021-04-19 RX ORDER — HALOPERIDOL 5 MG/ML
2.5-5 INJECTION INTRAMUSCULAR EVERY 6 HOURS PRN
Status: DISCONTINUED | OUTPATIENT
Start: 2021-04-19 | End: 2021-04-21 | Stop reason: HOSPADM

## 2021-04-19 RX ORDER — POTASSIUM CHLORIDE 7.45 MG/ML
10 INJECTION INTRAVENOUS
Status: COMPLETED | OUTPATIENT
Start: 2021-04-19 | End: 2021-04-19

## 2021-04-19 RX ORDER — GABAPENTIN 300 MG/1
600 CAPSULE ORAL EVERY 8 HOURS
Status: DISCONTINUED | OUTPATIENT
Start: 2021-04-22 | End: 2021-04-21 | Stop reason: HOSPADM

## 2021-04-19 RX ORDER — POTASSIUM CHLORIDE 1500 MG/1
20 TABLET, EXTENDED RELEASE ORAL ONCE
Status: COMPLETED | OUTPATIENT
Start: 2021-04-19 | End: 2021-04-19

## 2021-04-19 RX ORDER — LORAZEPAM 2 MG/ML
1 INJECTION INTRAMUSCULAR
Status: DISCONTINUED | OUTPATIENT
Start: 2021-04-19 | End: 2021-04-21 | Stop reason: HOSPADM

## 2021-04-19 RX ORDER — LANOLIN ALCOHOL/MO/W.PET/CERES
100 CREAM (GRAM) TOPICAL 3 TIMES DAILY
Status: DISCONTINUED | OUTPATIENT
Start: 2021-04-21 | End: 2021-04-21 | Stop reason: HOSPADM

## 2021-04-19 RX ORDER — DEXTROSE MONOHYDRATE, SODIUM CHLORIDE, AND POTASSIUM CHLORIDE 50; 1.49; 9 G/1000ML; G/1000ML; G/1000ML
INJECTION, SOLUTION INTRAVENOUS CONTINUOUS
Status: DISCONTINUED | OUTPATIENT
Start: 2021-04-19 | End: 2021-04-21

## 2021-04-19 RX ORDER — GABAPENTIN 600 MG/1
1200 TABLET ORAL ONCE
Status: COMPLETED | OUTPATIENT
Start: 2021-04-19 | End: 2021-04-19

## 2021-04-19 RX ORDER — PROCHLORPERAZINE 25 MG
25 SUPPOSITORY, RECTAL RECTAL EVERY 12 HOURS PRN
Status: DISCONTINUED | OUTPATIENT
Start: 2021-04-19 | End: 2021-04-21 | Stop reason: HOSPADM

## 2021-04-19 RX ORDER — IBUPROFEN 200 MG
TABLET ORAL PRN
Status: ON HOLD | COMMUNITY
End: 2021-04-21

## 2021-04-19 RX ORDER — MULTIPLE VITAMINS W/ MINERALS TAB 9MG-400MCG
1 TAB ORAL DAILY
Status: DISCONTINUED | OUTPATIENT
Start: 2021-04-19 | End: 2021-04-21 | Stop reason: HOSPADM

## 2021-04-19 RX ORDER — DIAZEPAM 5 MG
10 TABLET ORAL EVERY 30 MIN PRN
Status: DISCONTINUED | OUTPATIENT
Start: 2021-04-19 | End: 2021-04-19

## 2021-04-19 RX ORDER — METOPROLOL SUCCINATE 50 MG/1
50 TABLET, EXTENDED RELEASE ORAL AT BEDTIME
Status: ON HOLD | COMMUNITY
End: 2021-04-21

## 2021-04-19 RX ORDER — ONDANSETRON 4 MG/1
4 TABLET, ORALLY DISINTEGRATING ORAL EVERY 6 HOURS PRN
Status: DISCONTINUED | OUTPATIENT
Start: 2021-04-19 | End: 2021-04-21 | Stop reason: HOSPADM

## 2021-04-19 RX ORDER — GABAPENTIN 300 MG/1
300 CAPSULE ORAL EVERY 8 HOURS
Status: DISCONTINUED | OUTPATIENT
Start: 2021-04-24 | End: 2021-04-21 | Stop reason: HOSPADM

## 2021-04-19 RX ORDER — OLANZAPINE 5 MG/1
5-10 TABLET, ORALLY DISINTEGRATING ORAL EVERY 6 HOURS PRN
Status: DISCONTINUED | OUTPATIENT
Start: 2021-04-19 | End: 2021-04-21 | Stop reason: HOSPADM

## 2021-04-19 RX ORDER — LISINOPRIL 20 MG/1
20 TABLET ORAL DAILY
Status: ON HOLD | COMMUNITY
End: 2021-04-21

## 2021-04-19 RX ORDER — GABAPENTIN 300 MG/1
900 CAPSULE ORAL EVERY 8 HOURS
Status: DISCONTINUED | OUTPATIENT
Start: 2021-04-19 | End: 2021-04-21 | Stop reason: HOSPADM

## 2021-04-19 RX ORDER — POTASSIUM CHLORIDE 1500 MG/1
20 TABLET, EXTENDED RELEASE ORAL DAILY
Status: ON HOLD | COMMUNITY
End: 2021-04-21

## 2021-04-19 RX ORDER — LANOLIN ALCOHOL/MO/W.PET/CERES
100 CREAM (GRAM) TOPICAL DAILY
Status: DISCONTINUED | OUTPATIENT
Start: 2021-04-26 | End: 2021-04-21 | Stop reason: HOSPADM

## 2021-04-19 RX ORDER — LORAZEPAM 1 MG/1
1-2 TABLET ORAL EVERY 30 MIN PRN
Status: DISCONTINUED | OUTPATIENT
Start: 2021-04-19 | End: 2021-04-21 | Stop reason: HOSPADM

## 2021-04-19 RX ORDER — FAMOTIDINE 20 MG/1
20 TABLET, FILM COATED ORAL 2 TIMES DAILY
Status: DISCONTINUED | OUTPATIENT
Start: 2021-04-19 | End: 2021-04-19

## 2021-04-19 RX ADMIN — CLOSTRIDIUM TETANI TOXOID ANTIGEN (FORMALDEHYDE INACTIVATED), CORYNEBACTERIUM DIPHTHERIAE TOXOID ANTIGEN (FORMALDEHYDE INACTIVATED), BORDETELLA PERTUSSIS TOXOID ANTIGEN (GLUTARALDEHYDE INACTIVATED), BORDETELLA PERTUSSIS FILAMENTOUS HEMAGGLUTININ ANTIGEN (FORMALDEHYDE INACTIVATED), BORDETELLA PERTUSSIS PERTACTIN ANTIGEN, AND BORDETELLA PERTUSSIS FIMBRIAE 2/3 ANTIGEN 0.5 ML: 5; 2; 2.5; 5; 3; 5 INJECTION, SUSPENSION INTRAMUSCULAR at 00:05

## 2021-04-19 RX ADMIN — SODIUM PHOSPHATE, MONOBASIC, MONOHYDRATE AND SODIUM PHOSPHATE, DIBASIC, ANHYDROUS 9 MMOL: 276; 142 INJECTION, SOLUTION INTRAVENOUS at 15:59

## 2021-04-19 RX ADMIN — ACETAMINOPHEN 650 MG: 325 TABLET, FILM COATED ORAL at 10:08

## 2021-04-19 RX ADMIN — POTASSIUM CHLORIDE 10 MEQ: 7.46 INJECTION, SOLUTION INTRAVENOUS at 13:46

## 2021-04-19 RX ADMIN — POTASSIUM CHLORIDE 10 MEQ: 7.46 INJECTION, SOLUTION INTRAVENOUS at 05:29

## 2021-04-19 RX ADMIN — SODIUM PHOSPHATE, MONOBASIC, MONOHYDRATE AND SODIUM PHOSPHATE, DIBASIC, ANHYDROUS 15 MMOL: 276; 142 INJECTION, SOLUTION INTRAVENOUS at 04:27

## 2021-04-19 RX ADMIN — THIAMINE HCL TAB 100 MG 200 MG: 100 TAB at 15:31

## 2021-04-19 RX ADMIN — POTASSIUM CHLORIDE, DEXTROSE MONOHYDRATE AND SODIUM CHLORIDE: 150; 5; 900 INJECTION, SOLUTION INTRAVENOUS at 03:04

## 2021-04-19 RX ADMIN — POTASSIUM CHLORIDE 10 MEQ: 7.46 INJECTION, SOLUTION INTRAVENOUS at 14:52

## 2021-04-19 RX ADMIN — POTASSIUM CHLORIDE 20 MEQ: 1500 TABLET, EXTENDED RELEASE ORAL at 18:25

## 2021-04-19 RX ADMIN — THIAMINE HCL TAB 100 MG 200 MG: 100 TAB at 08:00

## 2021-04-19 RX ADMIN — POTASSIUM CHLORIDE 10 MEQ: 7.46 INJECTION, SOLUTION INTRAVENOUS at 07:59

## 2021-04-19 RX ADMIN — ONDANSETRON 4 MG: 2 INJECTION INTRAMUSCULAR; INTRAVENOUS at 02:58

## 2021-04-19 RX ADMIN — POTASSIUM CHLORIDE 10 MEQ: 7.46 INJECTION, SOLUTION INTRAVENOUS at 04:21

## 2021-04-19 RX ADMIN — THIAMINE HCL TAB 100 MG 200 MG: 100 TAB at 21:13

## 2021-04-19 RX ADMIN — LORAZEPAM 1 MG: 1 TABLET ORAL at 21:14

## 2021-04-19 RX ADMIN — LORAZEPAM 1 MG: 1 TABLET ORAL at 10:08

## 2021-04-19 RX ADMIN — GABAPENTIN 1200 MG: 600 TABLET, FILM COATED ORAL at 02:49

## 2021-04-19 RX ADMIN — FAMOTIDINE 20 MG: 10 INJECTION, SOLUTION INTRAVENOUS at 02:45

## 2021-04-19 RX ADMIN — MULTIPLE VITAMINS W/ MINERALS TAB 1 TABLET: TAB at 08:00

## 2021-04-19 RX ADMIN — GABAPENTIN 900 MG: 300 CAPSULE ORAL at 08:00

## 2021-04-19 RX ADMIN — FOLIC ACID 1 MG: 1 TABLET ORAL at 08:00

## 2021-04-19 RX ADMIN — GABAPENTIN 900 MG: 300 CAPSULE ORAL at 15:31

## 2021-04-19 RX ADMIN — LORAZEPAM 1 MG: 1 TABLET ORAL at 15:31

## 2021-04-19 RX ADMIN — POTASSIUM CHLORIDE 10 MEQ: 7.46 INJECTION, SOLUTION INTRAVENOUS at 06:34

## 2021-04-19 RX ADMIN — CLONIDINE HYDROCHLORIDE 0.1 MG: 0.1 TABLET ORAL at 02:49

## 2021-04-19 ASSESSMENT — ACTIVITIES OF DAILY LIVING (ADL)
ADLS_ACUITY_SCORE: 17
DEPENDENT_IADLS:: INDEPENDENT
ADLS_ACUITY_SCORE: 16
ADLS_ACUITY_SCORE: 18

## 2021-04-19 ASSESSMENT — MIFFLIN-ST. JEOR: SCORE: 1412.43

## 2021-04-19 NOTE — ED TRIAGE NOTES
Pt presents via EMS for concern of a possible seizure. EMS reports being called by the patients wife for concern of an episode of unresponsiveness. Wife reports leaving for 3 hours and finding the patient on the ground. EMS reports the patient alert and oriented upon their arrival without incontinence. Injury to the tongue noted. Wife reports history of alcohol withdrawal seizures. Pt reports last drink x3 days ago but EMS states the wife believes it is likely much more recent. ABC intact, A&Ox4.

## 2021-04-19 NOTE — CONSULTS
Care Management Initial Consult    General Information  Assessment completed with: FamilyAshleigh ( wife)    Type of CM/SW Visit: CM Role Introduction  Sw has attempted to speak with pt 2x today. Pt soundly sleeping at both attempts     Primary Care Provider verified and updated as needed:     Readmission within the last 30 days: no previous admission in last 30 days   Return Category: Progression of disease  Reason for Consult: insurance concerns  Advance Care Planning:            Communication Assessment  Patient's communication style: spoken language (English or Bilingual)    Hearing Difficulty or Deaf: no   Wear Glasses or Blind: yes    Cognitive  Cognitive/Neuro/Behavioral: WDL  Level of Consciousness: alert  Arousal Level: opens eyes spontaneously  Orientation: oriented x 4  Mood/Behavior: cooperative  Best Language: 0 - No aphasia  Speech: slow, clear    Living Environment:   People in home: spouse  Ashleigh  Current living Arrangements: apartment      Able to return to prior arrangements: other (see comments)  Living Arrangement Comments: (wife is concerned about pt  being home )    Family/Social Support:  Care provided by: self  Provides care for: no one  Marital Status: - second marriage   Wife  Ashleigh       Description of Support System: Involved         Current Resources:   Patient receiving home care services: No     Community Resources: None  Equipment currently used at home: none  Supplies currently used at home: None    Employment/Financial:  Employment Status: unemployed        Financial Concerns: insurance, none, unemployed   Referral to Financial Counselor: Yes       Lifestyle & Psychosocial Needs:        Socioeconomic History     Marital status:      Spouse name: Not on file     Number of children: Not on file     Years of education: Not on file     Highest education level: Not on file          Functional Status:  Prior to admission patient needed assistance:   Dependent ADLs::  Independent  Dependent IADLs:: Independent       Mental Health Status:          Chemical Dependency Status:  Chemical Dependency Status: Current Concern  Chemical Dependency Management: Previous treatment    PT had a CD eval back in 7/26/2019 recommendations were for Residential CD program. Per conversation with spouse. Pt went to treatment but only attended one day.,   This info is in second chart MRN # [id number]          Values/Beliefs:  Spiritual, Cultural Beliefs, Alevism Practices, Values that affect care:                 Additional Information:  SW will try and see pt tomorrow.. Per conversation with wife, pt has been drinking heavily daily.. Pt drinks cheap whiskey and beer daily .. Wife stated he even quit his job that he had working for the [employer]. He was working from home but still quit his job around 9/25/20.  Pt's family are in [place]. His wife is feeling a lot of pressure and is not sure she can continue to live with pt due to the stress his drinking causes. Wife works full time and is attending school as well.     Pt has a new primary at the [facility] but has not been seen in months. Wife has requested that SW ask the MD to consider a CD commitment.. SW explained that her wish may not be the plan of support for pt.. wife is tearful and does not understand why we an not force this issue with he spouse.     At this time DENNY will ask   FC see pt and assist him to  apply for insurance.. DENNY hope to see pt tomorrow and ask about his interest in having a CD eval or possible Treatment.            Corinne C. White, FABIAN

## 2021-04-19 NOTE — PLAN OF CARE
ICU End of Shift Summary.  For vital signs and complete assessments, please see documentation flowsheets.     Pertinent assessments: Patient alert and oriented, able to make needs known. Tolerating diet. Up to commode with assist  Major Shift Events: minimal ativan use for alcohol withdrawal. Transitioned to tele overflow.  Plan (Upcoming Events): transfer to tele bed. Continue to monitor and treat alcohol withdrawl  Discharge/Transfer Needs: to be determined    Bedside Shift Report Completed : yes  Bedside Safety Check Completed:yes    Geovanna Hopper RN

## 2021-04-19 NOTE — ED PROVIDER NOTES
History   Chief Complaint:  Altered Mental Status     The history is provided by the patient and the EMS personnel.      El Freire is a 46 year old male with a history of alcohol abuse and withdrawal who presents from home via EMS for evaluation of possible seizure activity. Per EMS, the patient's wife returned after being gone for 3 hours to find her  on the floor, confused, and with some blood around his mouth from an injuries to his tongue. He denies other injury including head, neck, or back pain. Though unwitnessed, the patient's wife thinks that he had a seizure which he has a history of in the setting of alcohol withdrawal. EMS denies any known incontinence or vomiting. The patient was alert and oriented for EMS but they gave 1 mg Ativan en route because the patient was tremulous. EMS states that the patient is somewhat unsteady on his feet which the patient's wife states is not normal for him. He does have a baseline slight right facial droop. He states that his last drink was 3 days ago but per EMS, the patient's wife believes his last drink was much more recent than that. He states that he is trying to quit drinking. He reports feeling weak over the past few days. He believes that he fell but is unable to describe the event. He states that he vomited yesterday but denies nausea or vomiting today. He does have some epigastric abdominal pain. He has a chronic cough that exacerbates mild chest pain. He denies fever, shortness of breath, or recent illness. He denies known COVID-19 exposures.     Review of Systems   Constitutional: Negative for fever.   HENT: Negative for congestion and rhinorrhea.         Tongue wounds   Respiratory: Positive for cough (chronic). Negative for shortness of breath.    Gastrointestinal: Positive for abdominal pain. Negative for nausea and vomiting.   Genitourinary:        No incontinence   Musculoskeletal: Positive for gait problem. Negative for back pain and neck  pain.   Neurological: Positive for tremors, seizures (unwitnessed) and weakness. Negative for headaches.   Psychiatric/Behavioral: Positive for confusion (resolved). Negative for decreased concentration.   All other systems reviewed and are negative.    Allergies:  The patient has no known allergies.     Medications:  The patient is not currently taking any prescribed medications.    Past Medical History:    Alcohol abuse and withdrawal      Social History:  Presents to the ED: via EMS   Marital Status:     Physical Exam     Patient Vitals for the past 24 hrs:   BP Temp Pulse Resp SpO2   04/18/21 2245 (!) 129/92 -- 79 -- 94 %   04/18/21 2215 (!) 144/103 -- 90 -- 99 %   04/18/21 2200 (!) 139/99 -- 79 -- 99 %   04/18/21 2145 (!) 141/106 -- 88 -- 97 %   04/18/21 2130 (!) 144/119 -- -- -- 95 %   04/18/21 2100 -- -- -- -- 95 %   04/18/21 2045 (!) 147/109 -- -- -- 95 %   04/18/21 2030 (!) 141/106 -- 92 -- 97 %   04/18/21 2028 -- 97.8  F (36.6  C) -- -- --   04/18/21 2025 (!) 138/106 -- 90 16 98 %       Physical Exam    General: Sitting up in bed  Eyes:  The pupils are equal and round    Conjunctivae and sclerae are normal  ENT:    Wearing a mask. Superficial tongue laceration bilaterally with no bleeding  Neck:  Normal range of motion, non tender neck  CV:  Regular rate, regular rhythm    Skin warm and well perfused   Resp:  Non labored breathing on room air    No tachypnea    No cough heard    Lungs clear bilaterally  GI:  Abdomen is soft, there is no rigidity    No distension    No rebound tenderness     No abdominal tenderness  MS:  Normal muscular tone  Skin:  No rash or acute skin lesions noted  Neuro:   Awake, alert.      Speech is normal and fluent.    Face is symmetric.     Moves all extremities equally     Bilateral hand tremors  Psych: Normal affect.  Appropriate interactions.    Emergency Department Course     ECG:  ECG taken at 2034, ECG read at 2037  Normal sinus rhythm  Normal ECG  No previous ECG  available  Rate 83 bpm. VA interval 162 ms. QRS duration 84 ms. QT/QTc 402/472 ms. P-R-T axes 27 9 -11.    Imaging:  XR Chest Port 1 View  Heart size upper limits of normal. Mildly tortuous thoracic aorta. Lungs are clear.    Head CT without Contrast  1. No acute intracranial process. 2. Advanced atrophy for age.     Reading per radiology.    Laboratory:  CBC: WBC: 5.9, HGB: 12.1 (L), PLT: 139 (L)    CMP: Glucose 179 (H), Potassium: 2.5 (LL), Carbon Dioxide: 18 (L), Anion Gap: 21 (H), Urea Nitrogen: 3 (L), Creatinine: 0.42 (L) Calcium: 8.3 (L), Bilirubin Total: 2.8 (H), AST: 196 (H), o/w WNL    Troponin (Collected 2033): <0.015    Lipase: 164    INR: 1.27 (H)    Magnesium: 1.1 (L)    Alcohol ethyl: <0.01    Symptomatic Influenza A/B antigen & COVID-19 PCR: pending    Emergency Department Course:    Reviewed:  I reviewed the patient's nursing notes, vitals and past medical history.     Assessments:  2020 I performed an exam of the patient in room ED12 as documented above.  2157 Patient rechecked and updated. He is mildly tremulous.  2300 I updated the patient on results and discussed plan of care.    Consults:    2252 I spoke with Dr. Nichols of the hospitalist service regarding patient's presentation, findings, and plan of care.    Interventions:  2033 NS, 1 L, IV  2040 Zofran, 4 mg, IV  2206 Klor-con, 40 mEq, Oral   2206 Ativan, 1 mg, IV   2207 Magnesium  Sulfate, 2 g, IV     Disposition:  The patient was admitted to the hospital under the care of Dr. Nichols.     Impression & Plan     CMS Diagnoses: None    Medical Decision Making:  El Freire is a 46 year old male who presents to the emergency department today for evaluation of likely seizure. Patient had likely seizure at home. Likely from alcohol withdrawal. CT head negative. Labs consistent with history of heavy alcohol use. Electrolytes started to be replaced in ED. Less likely arrhtyhmia causing syncope but is at risk for this given his lab  abnormalities. Tongue lacerations did not require repair. Will admit and discussed with hospitalist for admission.     Covid-19  El Freire was evaluated during a global COVID-19 pandemic, which necessitated consideration that the patient might be at risk for infection with the SARS-CoV-2 virus that causes COVID-19.   Applicable protocols for evaluation were followed during the patient's care.   COVID-19 was considered as part of the patient's evaluation. The plan for testing is:  a test was obtained during this visit.    Diagnosis:    ICD-10-CM    1. Alcohol withdrawal seizure without complication (H)  F10.230     R56.9    2. Laceration of tongue, initial encounter  S01.512A    3. Hypokalemia  E87.6    4. Hypomagnesemia  E83.42      Scribe Disclosure:  I, Araceli Dorsey, am serving as a scribe at 8:28 PM on 4/18/2021 to document services personally performed by Carol Yeager MD based on my observations and the provider's statements to me.    This note was completed in part using Dragon voice recognition software. Although reviewed after completion, some word and grammatical errors may occur.      Carol Yeager MD  04/19/21 0000       Carol Yeager MD  04/19/21 0001

## 2021-04-19 NOTE — H&P
[facility]  Hospitalist Admission Note  Name: El Freire    MRN: [id number]  YOB: 1975    Age: 46 year old  Date of admission: 4/18/2021  Primary care provider: No Ref-Primary, Physician            Assessment and Plan:   El Freire is a 46 year old male reportedly with history of alcohol abuse and alcohol withdrawals, who was brought to the emergency room due to alteration in mental state and possible seizure-like activity that occurred at home but was unwitnessed.  As per patient's wife account patient has been drinking on a regular basis and she left him at home for approximately 3 hours but upon coming back she found our patient on the floor, confused and with the blood coming from his mouth and injuries to his tongue area.    1.  Encephalopathy, metabolic likely secondary to alcohol withdrawals  2.  Possible seizures at home, most likely alcohol withdrawal related  3.  Alcohol dependence  4.  Alcohol abuse  5.  Alcohol induced transaminitis  6.  Severe hypokalemia  7.  Anemia, thrombocytopenia likely secondary to chronic alcoholism  8. Macrocytosis  9.  Pending COVID-19 testing    Admit as inpatient.  High risk for clinical deterioration.  Will be requiring at least 2 midnight inpatient stay  -Requested telemetry monitoring given concomitant severe hypokalemia  -Electrolyte supplementation protocol, check magnesium, check phosphate levels  -Alcohol withdrawal CIWA protocol.  Benzodiazepine triggered protocol started gabapentin pathway  -Folic acid, thiamine and multivitamins  -Seizure precautions  -Requested formal neurology evaluation but likely this is alcohol withdrawal related  -CT of the head showed no acute pathology but with advanced atrophy as per his age, might be related to chronic alcoholism  -IV fluid support  -Fall precautions  -As needed antiemetics  -Offered CD evaluation, needs social service input as well  -Pending COVID-19 screen  -May have regular diet if  fully awake  -As needed clonidine for severe uncontrolled hypertension is likely alcohol withdrawals related    Code status: Poor historian, deferred to full code  Admit to inpatient  Prophylaxis: Mechanical  Disposition: To be determined but likely will be needing at least 2 inpatient days          Chief Complaint:   Alteration mental state, possible seizures at home       Source of Information:   Patient with poor reliability  Discussion with ED physician  Review of E chart records         History of Present Illness:   El Freire is a 46 year old male reportedly with history of alcohol abuse and alcohol withdrawals, who was brought to the emergency room due to alteration in mental state and possible seizure-like activity that occurred at home but was unwitnessed.  As per patient's wife account patient has been drinking on a regular basis and she left him at home for approximately 3 hours but upon coming back she found our patient on the floor, confused and with the blood coming from his mouth and injuries to his tongue area.  It was also mentioned that he had a prior alcohol withdrawals seizure-like activity in the past.  EMS personnel was activated and upon initial presentation patient appears to be tremulous and no apparent seizure-like activities seen, unsteady on his feet and was provided with 1 mg of Ativan in route to the emergency room.    There was also some account that he has been having generalized weakness, episodes of vomiting yesterday but none today.  There was no report of fevers, chills, diarrhea, abdominal pain, chest pain or back pain.  All of these system review are are obtained through collateral sources as patient remained to be a poor historian .  Upon presentation in the emergency room he was found to have elevated blood pressure levels, not tachycardic, afebrile and not hypoxic.   Further investigation revealed multiple electrolyte derangements, acidosis, transaminitis and with  accompanying mild anemia, thrombocytopenia, high MCV .  CT of the head showed no acute pathology but did reveal advanced atrophy for his age, chest x-ray showed no effusion, infiltrates or acute pathology.  He is a case was referred to us for further evaluation and care hence this hospitalization  During the time of my exam I found El awake, alert, somewhat cooperative but remained to be a poor historian.  He endorses no other new complaints.              Past Medical History:   No past medical history on file.  As above       Past Surgical History:   No past surgical history on file.          Social History:     Patient stated that he drinks on a regular basis.  He was not able to quantify amount of drinks          Family History:   Family history was fully reviewed and non-contributory in this case.         Allergies:   Not on File          Medications:     Prior to Admission medications    Not on File             Review of Systems:   Unable to obtain as patient is a poor historian       Physical Exam:   Blood pressure (!) 129/92, pulse 79, temperature 97.8  F (36.6  C), resp. rate 16, SpO2 94 %.  Wt Readings from Last 1 Encounters:   No data found for Wt     Exam:  GENERAL: No apparent distress. Awake, alert, and fully oriented.  HEENT: Normocephalic, atraumatic. Extraocular movements intact.  CARDIOVASCULAR: Regular rate and rhythm without murmurs or rubs. No JVD  Swollen lips, minimal bleeding at the tongue area  PULMONARY: Clear to auscultation, no wheezes, crackles  ABDOMINAL: Soft, non-tender, non-distended. Bowel sounds normoactive. No hepatosplenomegaly.  EXTREMITIES: No cyanosis or clubbing. No edema.  NEUROLOGICAL: CN 2-12 grossly intact, awake and alert x2, spontaneous and coherent speech. no focal neurological deficits.  DERMATOLOGICAL: No rash, ulcer, ecchymoses, jaundice.  Psych: not agitation, not combative, pleasant mood           Data:   EKG: Normal sinus rhythm at 82 bpm    Imaging:  Recent  Results (from the past 48 hour(s))   XR Chest Port 1 View    Narrative    EXAM: XR CHEST PORT 1 VW  LOCATION: [facility]  DATE/TIME: 4/18/2021 9:08 PM    INDICATION: Cough.  COMPARISON: None.      Impression    IMPRESSION: Heart size upper limits of normal. Mildly tortuous thoracic aorta. Lungs are clear.   Head CT w/o contrast    Narrative    EXAM: CT HEAD W/O CONTRAST  LOCATION: [facility]  DATE/TIME: 4/18/2021 10:28 PM    INDICATION: Head trauma, abnormal mental status (Age 19-64y)  COMPARISON: None.  TECHNIQUE: Routine CT Head without IV contrast. Multiplanar reformats. Dose reduction techniques were used.    FINDINGS:  INTRACRANIAL CONTENTS: No intracranial hemorrhage, extraaxial collection, or mass effect.  No CT evidence of acute infarct. Normal parenchymal attenuation. Moderate generalized volume loss. No hydrocephalus.     VISUALIZED ORBITS/SINUSES/MASTOIDS: No intraorbital abnormality. No paranasal sinus mucosal disease. No middle ear or mastoid effusion.    BONES/SOFT TISSUES: No acute abnormality.      Impression    IMPRESSION:  1.  No acute intracranial process.  2.  Advanced atrophy for age.       Labs:  No results for input(s): CULT in the last 168 hours.  No results for input(s): PH, PHARTERIAL, PO2, [id number], SAT, PCO2, HCO3, BASEEXCESS, RUTH, BEB in the last 168 hours.    Invalid input(s): [id number]  Recent Labs   Lab 04/18/21 2033   WBC 5.9   HGB 12.1*   HCT 35.9*   *   *     Recent Labs   Lab 04/18/21 2033      POTASSIUM 2.5*   CHLORIDE 99   CO2 18*   ANIONGAP 21*   *   BUN 3*   CR 0.42*   GFRESTIMATED >90   GFRESTBLACK >90   BELIA 8.3*   MAG 1.1*   PROTTOTAL 8.0   ALBUMIN 3.5   BILITOTAL 2.8*   ALKPHOS 139   *   ALT 70     No results for input(s): SED, CRP in the last 168 hours.  Recent Labs   Lab 04/18/21 2033   *     Recent Labs   Lab 04/18/21 2033   INR 1.27*     Recent Labs   Lab 04/18/21 2033   TROPI <0.015     No  results for input(s): TSH in the last 168 hours.  No results for input(s): COLOR, APPEARANCE, URINEGLC, URINEBILI, URINEKETONE, SG, UBLD, URINEPH, PROTEIN, UROBILINOGEN, NITRITE, LEUKEST, RBCU, WBCU in the last 168 hours.

## 2021-04-19 NOTE — PROGRESS NOTES
Spoke with wife via phone. Wife expresses concern about patient coming home, she is unsure if she wants him to come home. She states patient is unwilling to quit drinking even after rehab.   Geovanna Hopper RN

## 2021-04-19 NOTE — ED NOTES
[facility]  ED Nurse Handoff Report    El Freire is a 46 year old male   ED Chief complaint: Altered Mental Status  . ED Diagnosis:   Final diagnoses:   Alcohol withdrawal seizure without complication (H)   Laceration of tongue, initial encounter   Hypokalemia   Hypomagnesemia     Allergies: Not on File    Code Status: Full Code  Activity level - Baseline/Home:  Independent. Activity Level - Current:   Assist X 1. Lift room needed: No. Bariatric: No   Needed: No   Isolation: Yes. Infection: COVID r/o (pt states cough - unknown how long)    Vital Signs:   Vitals:    04/18/21 2145 04/18/21 2200 04/18/21 2215 04/18/21 2245   BP: (!) 141/106 (!) 139/99 (!) 144/103 (!) 129/92   Pulse: 88 79 90 79   Resp:       Temp:       SpO2: 97% 99% 99% 94%       Cardiac Rhythm:  ,      Pain level:    Patient confused: No. Patient Falls Risk: Yes.   Elimination Status: Has voided   Patient Report - Initial Complaint: ETOH withdrawal seizure. Focused Assessment: 46 year old male with a history of alcohol abuse and withdrawal who presents from home via EMS for evaluation of possible seizure activity. Per EMS, the patient's wife returned after being gone for 3 hours to find her  on the floor, confused, and with some blood around his mouth from an injuries to his tongue. He denies other injury including head, neck, or back pain. Though unwitnessed, the patient's wife thinks that he had a seizure which he has a history of in the setting of alcohol withdrawal. EMS denies any known incontinence or vomiting. The patient was alert and oriented for EMS but they gave 1 mg Ativan en route because the patient was tremulous. EMS states that the patient is somewhat unsteady on his feet which the patient's wife states is not normal for him. He does have a baseline slight right facial droop. He states that his last drink was 3 days ago but per EMS, the patient's wife believes his last drink was much more recent  than that. He states that he is trying to quit drinking. He reports feeling weak over the past few days. He believes that he fell but is unable to describe the event. He states that he vomited yesterday but denies nausea or vomiting today. He does have some epigastric abdominal pain. He has a chronic cough that exacerbates mild chest pain. He denies fever, shortness of breath, or recent illness. He denies known COVID-19 exposures.       Tests Performed: Labs, CT, xray, EKG. Abnormal Results:   Labs Ordered and Resulted from Time of ED Arrival Up to the Time of Departure from the ED   CBC WITH PLATELETS DIFFERENTIAL - Abnormal; Notable for the following components:       Result Value    RBC Count 3.37 (*)     Hemoglobin 12.1 (*)     Hematocrit 35.9 (*)      (*)     MCH 35.9 (*)     Platelet Count 139 (*)     Absolute Lymphocytes 0.2 (*)     All other components within normal limits   INR - Abnormal; Notable for the following components:    INR 1.27 (*)     All other components within normal limits   COMPREHENSIVE METABOLIC PANEL - Abnormal; Notable for the following components:    Potassium 2.5 (*)     Carbon Dioxide 18 (*)     Anion Gap 21 (*)     Glucose 179 (*)     Urea Nitrogen 3 (*)     Creatinine 0.42 (*)     Calcium 8.3 (*)     Bilirubin Total 2.8 (*)      (*)     All other components within normal limits   MAGNESIUM - Abnormal; Notable for the following components:    Magnesium 1.1 (*)     All other components within normal limits   LIPASE   TROPONIN I   INFLUENZA A/B & SARS-COV2 PCR MULTIPLEX   ALCOHOL ETHYL   PERIPHERAL IV CATHETER     XR Chest Port 1 View   Final Result   IMPRESSION: Heart size upper limits of normal. Mildly tortuous thoracic aorta. Lungs are clear.      Head CT w/o contrast    (Results Pending)     .   Treatments provided: See MAR  Family Comments: Wife updated  OBS brochure/video discussed/provided to patient:  N/A  ED Medications:   Medications   ondansetron (ZOFRAN)  injection 4 mg (4 mg Intravenous Given 4/18/21 2040)   magnesium sulfate 2 g in water intermittent infusion (2 g Intravenous New Bag 4/18/21 2207)   0.9% sodium chloride BOLUS (0 mLs Intravenous Stopped 4/18/21 2133)   potassium chloride ER (KLOR-CON M) CR tablet 40 mEq (40 mEq Oral Given 4/18/21 2206)   LORazepam (ATIVAN) injection 1 mg (1 mg Intravenous Given 4/18/21 2206)     Drips infusing:  No  For the majority of the shift, the patient's behavior Green. Interventions performed were N/A.    Sepsis treatment initiated: No     Patient tested for COVID 19 prior to admission: YES    ED Nurse Name/Phone Number: Zoe Gambino RN,   10:56 PM

## 2021-04-19 NOTE — PROGRESS NOTES
[facility]    Medicine Progress Note - Hospitalist Service       Date of Admission:  4/18/2021  Length of stay: 1 days    Assessment & Plan   El Freire is a 46-year-old male with history of alcohol abuse and alcohol withdrawal including alcohol withdrawal seizure in the past he is admitted to the hospitalist service with acute encephalopathy and possible seizure activity.    Today-CIWA scales not too high, 8 and 5.  Continue protocol.     Alcohol abuse  Alcohol dependence  Alcohol withdrawal  Alcohol withdrawal seizure  - Per report, patient's wife left home for about 3 hours but when she returned home she found him on the floor, confused with blood coming from his mouth and injuries to his tongue.  This is most concerning for alcohol withdrawal seizure.  He reports he has been drinking heavily but is trying to cut down.  - Gabapentin on a scheduled taper as per CIWA protocol.  - Symptom triggered lorazepam.  - Chemical dependency consult-patient is interested in quitting drinking    Mild alcoholic hepatitis  Right upper quadrant abdominal pain  - AST 51, ALT 77 and bilirubin 3.1 on 4/19.  Patient complaining of some right upper quadrant discomfort.   - Check right upper quadrant ultrasound which shows no signs concerning for cholecystitis, does show hepatic steatosis.  - Monitor.    Hypokalemia  - Replace per protocol    Anemia/thrombocytopenia  - Mild, hemoglobin 12.6 and platelet 113, likely due to toxic effect of alcohol on bone marrow.    Diet: Regular  DVT Prophylaxis: Low Risk/Ambulatory with no VTE prophylaxis indicated  Malnutrition: No  Palacios Catheter: No  Code Status: Full Code     Disposition Plan   Expected discharge:   Monitor at least overnight, keep in hospital longer if needed     Demarco Mcfadden MD  Hospitalist Service  [facility]  ______________________________________________________________________    Interval History   No acute distress.  Patient says he  "has generalized pain, some right upper quadrant, some in his left ribs, some in his legs and arms, some in his back.    Data reviewed today: I reviewed all medications, new labs and imaging results over the last 24 hours.     Physical Exam   BP (!) 114/90   Pulse 75   Temp 99  F (37.2  C)   Resp 16   Ht 1.676 m (5' 6\")   Wt 59 kg (130 lb)   SpO2 99%   BMI 20.98 kg/m    130 lbs 0 oz       General: Laying flat in the bed in no acute distress.    HEENT: No scleral icterus. Oropharynx moist.     Neck: Supple. Normal range of motion.     Pulmonary: Normal work of breathing. Clear to auscultation bilaterally.    Cardiovascular: Regular rate and rhythm without murmur or extra heart sounds.    Abdomen: Mild right upper quadrant tenderness to palpation.    Extremities: No peripheral edema. No clubbing or cyanosis.  Mild left rib tenderness to palpation.    Neurologic: Awake, alert, appropriate.    Skin: Warm and dry.    Psychiatric: Normal affect and mood.     Data    Recent Labs   Lab 04/19/21  1034 04/19/21  0624 04/19/21  0248 04/18/21  2033   WBC  --  8.0  --  5.9   HGB  --  12.6*  --  12.1*   MCV  --  105*  --  107*   PLT  --  113*  --  139*   INR  --   --   --  1.27*   NA  --  138  --  138   POTASSIUM 3.1* 3.2* 2.9* 2.5*   CHLORIDE  --  102  --  99   CO2  --  28  --  18*   BUN  --  1*  --  3*   CR  --  0.39*  --  0.42*   ANIONGAP  --  8  --  21*   BELIA  --  8.0*  --  8.3*   GLC  --  120*  --  179*   ALBUMIN  --  3.2*  --  3.5   PROTTOTAL  --  7.5  --  8.0   BILITOTAL  --  3.1*  --  2.8*   ALKPHOS  --  127  --  139   ALT  --  77*  --  70   AST  --  251*  --  196*   LIPASE  --   --   --  164   TROPI  --   --   --  <0.015     Recent Results (from the past 24 hour(s))   XR Chest Port 1 View    Narrative    EXAM: XR CHEST PORT 1 VW  LOCATION: [facility]  DATE/TIME: 4/18/2021 9:08 PM    INDICATION: Cough.  COMPARISON: None.      Impression    IMPRESSION: Heart size upper limits of normal. Mildly tortuous " thoracic aorta. Lungs are clear.   Head CT w/o contrast    Narrative    EXAM: CT HEAD W/O CONTRAST  LOCATION: [facility]  DATE/TIME: 4/18/2021 10:28 PM    INDICATION: Head trauma, abnormal mental status (Age 19-64y)  COMPARISON: None.  TECHNIQUE: Routine CT Head without IV contrast. Multiplanar reformats. Dose reduction techniques were used.    FINDINGS:  INTRACRANIAL CONTENTS: No intracranial hemorrhage, extraaxial collection, or mass effect.  No CT evidence of acute infarct. Normal parenchymal attenuation. Moderate generalized volume loss. No hydrocephalus.     VISUALIZED ORBITS/SINUSES/MASTOIDS: No intraorbital abnormality. No paranasal sinus mucosal disease. No middle ear or mastoid effusion.    BONES/SOFT TISSUES: No acute abnormality.      Impression    IMPRESSION:  1.  No acute intracranial process.  2.  Advanced atrophy for age.   US Abdomen Limited    Narrative    ULTRASOUND ABDOMEN LIMITED   4/19/2021 11:31 AM     HISTORY:  Right upper quadrant abdominal pain, alcohol use.    COMPARISON: None available.    FINDINGS:    Gallbladder: Gallbladder sludge. No cholelithiasis, gallbladder wall  thickening or pericholecystic fluid. Sonographic Adams's sign is  negative.    Bile ducts:  CHD is normal diameter.  No intrahepatic biliary  dilatation. The distal aspect of the common bile duct is obscured by  overlying bowel gas.    Liver: It demonstrates increased parenchymal echogenicity. No focal  hepatic mass is visualized.    Pancreas:  Totally obscured by overlying bowel gas.    Right kidney:  No hydronephrosis or shadowing calculi.    Aorta and IVC:  Not specifically assessed.       Impression    IMPRESSION:    1. Hepatic steatosis. No focal hepatic mass visualized.  2. Gallbladder sludge. No cholelithiasis or sonographic evidence of  acute cholecystitis.    JAYDA MORGAN MD       Medications      Current Facility-Administered Medications   Medication Dose Route Frequency     folic acid  1 mg  Oral Daily     [START ON 4/26/2021] gabapentin  100 mg Oral Q8H     [START ON 4/24/2021] gabapentin  300 mg Oral Q8H     [START ON 4/22/2021] gabapentin  600 mg Oral Q8H     gabapentin  900 mg Oral Q8H     multivitamin w/minerals  1 tablet Oral Daily     potassium chloride  10 mEq Intravenous Q1H     sodium chloride (PF)  3 mL Intracatheter Q8H     thiamine  200 mg Oral TID    Followed by     [START ON 4/21/2021] thiamine  100 mg Oral TID    Followed by     [START ON 4/26/2021] thiamine  100 mg Oral Daily

## 2021-04-19 NOTE — PROGRESS NOTES
"SPIRITUAL HEALTH SERVICES Progress Note  [facility] Med/Surg 3rd floor    SH consult per pt request. Met with pt, El, who shares that he is [employer] and attending on-line Temple services. He expresses hope that he can get out of the hospital soon.     El names his spouse and his kemi as core to his support network. He expresses concern around existential worries related to the current racial unrest in [place] and wonders, \"What is the [employer] doing?\" Provided supportive listening and invited processing of thoughts/feelings while offering emotional support. Offered prayer together.  remains available.     DUC Emerson.  Staff     Office [phone]  Pronouns: he/him/his    "

## 2021-04-19 NOTE — PHARMACY-ADMISSION MEDICATION HISTORY
Admission medication history interview status for this patient is complete. See [facility] admission navigator for allergy information, prior to admission medications and immunization status.     Medication history interview done, indicate source(s): Patient and Family  Medication history resources (including written lists, pill bottles, clinic record): none  Pharmacy: [facility]    Changes made to PTA medication list:  Added: all medications  Deleted: none  Changed: none    Actions taken by pharmacist (provider contacted, etc):None     Additional medication history information:  Patient hasn't taken any medications for about 4 months    Medication reconciliation/reorder completed by provider prior to medication history?  N   (Y/N)    Prior to Admission medications    Medication Sig Last Dose Taking? Auth Provider   Acetaminophen (TYLENOL PO) Take by mouth as needed for mild pain or fever  Yes Unknown, Entered By History   ibuprofen (ADVIL/MOTRIN) 200 MG tablet Take by mouth as needed for mild pain  Yes Unknown, Entered By History   folic acid (FOLVITE) 1 MG tablet Take 1 mg by mouth daily More than a month at Unknown time  Unknown, Entered By History   lisinopril (ZESTRIL) 20 MG tablet Take 20 mg by mouth daily More than a month at Unknown time  Unknown, Entered By History   metoprolol succinate ER (TOPROL-XL) 50 MG 24 hr tablet Take 50 mg by mouth At Bedtime More than a month at Unknown time  Unknown, Entered By History   potassium chloride ER (KLOR-CON M) 20 MEQ CR tablet Take 20 mEq by mouth daily More than a month at Unknown time  Unknown, Entered By History   traZODone (DESYREL) 50 MG tablet Take 50 mg by mouth At Bedtime More than a month at Unknown time  Unknown, Entered By History

## 2021-04-19 NOTE — ED NOTES
Bed: ED12  Expected date: 4/18/21  Expected time: 8:09 PM  Means of arrival: Ambulance  Comments:  [facility] 46 M seizure

## 2021-04-19 NOTE — PLAN OF CARE
.ICU End of Shift Summary.  For vital signs and complete assessments, please see documentation flowsheets.     Pertinent assessments: New ED admission around 0300.  Alert/oriented.  Did not get out of bed yet since admission. Slight tremors noted.  Tele SR.  Afebrile.  BP hypertensive at times.  Clonidine given as scheduled.  Zofran x 1.  Patient threw up right after taking his Gabapentin.  Patient states he feels like throwing up with pill intake.  Complained of some back pain which patient states is from his fall.  Tongue abrasion noted.  CIWA 4.  2 PIV.  IVF infusing.  Currently replacing K+ and Phos+.  Using the urinal appropriately in bed.  Seizure pads in place.    Major Shift Events: as above  Plan (Upcoming Events): continue with electrolyte replacement.  Monitor for any signs of withdrawal.    Discharge/Transfer Needs: TBD

## 2021-04-20 LAB
ALBUMIN SERPL-MCNC: 2.8 G/DL (ref 3.4–5)
ALP SERPL-CCNC: 125 U/L (ref 40–150)
ALT SERPL W P-5'-P-CCNC: 71 U/L (ref 0–70)
ANION GAP SERPL CALCULATED.3IONS-SCNC: 5 MMOL/L (ref 3–14)
AST SERPL W P-5'-P-CCNC: 184 U/L (ref 0–45)
BILIRUB SERPL-MCNC: 3.5 MG/DL (ref 0.2–1.3)
BUN SERPL-MCNC: 3 MG/DL (ref 7–30)
CALCIUM SERPL-MCNC: 8.3 MG/DL (ref 8.5–10.1)
CHLORIDE SERPL-SCNC: 107 MMOL/L (ref 94–109)
CO2 SERPL-SCNC: 27 MMOL/L (ref 20–32)
CREAT SERPL-MCNC: 0.42 MG/DL (ref 0.66–1.25)
ERYTHROCYTE [DISTWIDTH] IN BLOOD BY AUTOMATED COUNT: 12.9 % (ref 10–15)
GFR SERPL CREATININE-BSD FRML MDRD: >90 ML/MIN/{1.73_M2}
GLUCOSE SERPL-MCNC: 83 MG/DL (ref 70–99)
HCT VFR BLD AUTO: 39 % (ref 40–53)
HGB BLD-MCNC: 13.1 G/DL (ref 13.3–17.7)
MAGNESIUM SERPL-MCNC: 1.6 MG/DL (ref 1.6–2.3)
MCH RBC QN AUTO: 36 PG (ref 26.5–33)
MCHC RBC AUTO-ENTMCNC: 33.6 G/DL (ref 31.5–36.5)
MCV RBC AUTO: 107 FL (ref 78–100)
PHOSPHATE SERPL-MCNC: 2.3 MG/DL (ref 2.5–4.5)
PLATELET # BLD AUTO: 124 10E9/L (ref 150–450)
POTASSIUM SERPL-SCNC: 3.3 MMOL/L (ref 3.4–5.3)
POTASSIUM SERPL-SCNC: 3.9 MMOL/L (ref 3.4–5.3)
PROT SERPL-MCNC: 6.9 G/DL (ref 6.8–8.8)
RBC # BLD AUTO: 3.64 10E12/L (ref 4.4–5.9)
SODIUM SERPL-SCNC: 139 MMOL/L (ref 133–144)
WBC # BLD AUTO: 6.1 10E9/L (ref 4–11)

## 2021-04-20 PROCEDURE — 84132 ASSAY OF SERUM POTASSIUM: CPT | Performed by: INTERNAL MEDICINE

## 2021-04-20 PROCEDURE — 250N000013 HC RX MED GY IP 250 OP 250 PS 637: Performed by: INTERNAL MEDICINE

## 2021-04-20 PROCEDURE — 99232 SBSQ HOSP IP/OBS MODERATE 35: CPT | Performed by: INTERNAL MEDICINE

## 2021-04-20 PROCEDURE — 258N000001 HC RX 258: Performed by: INTERNAL MEDICINE

## 2021-04-20 PROCEDURE — 120N000001 HC R&B MED SURG/OB

## 2021-04-20 PROCEDURE — 84100 ASSAY OF PHOSPHORUS: CPT | Performed by: INTERNAL MEDICINE

## 2021-04-20 PROCEDURE — 83735 ASSAY OF MAGNESIUM: CPT | Performed by: INTERNAL MEDICINE

## 2021-04-20 PROCEDURE — 85027 COMPLETE CBC AUTOMATED: CPT | Performed by: INTERNAL MEDICINE

## 2021-04-20 PROCEDURE — 80053 COMPREHEN METABOLIC PANEL: CPT | Performed by: INTERNAL MEDICINE

## 2021-04-20 PROCEDURE — 999N000216 HC STATISTIC ADULT CD FACE TO FACE-NO CHRG

## 2021-04-20 PROCEDURE — 36415 COLL VENOUS BLD VENIPUNCTURE: CPT | Performed by: INTERNAL MEDICINE

## 2021-04-20 RX ORDER — POTASSIUM CHLORIDE 1500 MG/1
20 TABLET, EXTENDED RELEASE ORAL ONCE
Status: COMPLETED | OUTPATIENT
Start: 2021-04-20 | End: 2021-04-20

## 2021-04-20 RX ADMIN — THIAMINE HCL TAB 100 MG 200 MG: 100 TAB at 21:54

## 2021-04-20 RX ADMIN — GABAPENTIN 900 MG: 300 CAPSULE ORAL at 23:51

## 2021-04-20 RX ADMIN — ACETAMINOPHEN 650 MG: 325 TABLET, FILM COATED ORAL at 09:04

## 2021-04-20 RX ADMIN — GABAPENTIN 900 MG: 300 CAPSULE ORAL at 01:03

## 2021-04-20 RX ADMIN — POTASSIUM & SODIUM PHOSPHATES POWDER PACK 280-160-250 MG 1 PACKET: 280-160-250 PACK at 21:54

## 2021-04-20 RX ADMIN — POTASSIUM CHLORIDE 20 MEQ: 1500 TABLET, EXTENDED RELEASE ORAL at 09:04

## 2021-04-20 RX ADMIN — THIAMINE HCL TAB 100 MG 200 MG: 100 TAB at 09:04

## 2021-04-20 RX ADMIN — POTASSIUM CHLORIDE, DEXTROSE MONOHYDRATE AND SODIUM CHLORIDE: 150; 5; 900 INJECTION, SOLUTION INTRAVENOUS at 16:09

## 2021-04-20 RX ADMIN — GABAPENTIN 900 MG: 300 CAPSULE ORAL at 16:08

## 2021-04-20 RX ADMIN — POTASSIUM & SODIUM PHOSPHATES POWDER PACK 280-160-250 MG 1 PACKET: 280-160-250 PACK at 09:04

## 2021-04-20 RX ADMIN — Medication 5 MG: at 21:56

## 2021-04-20 RX ADMIN — POTASSIUM CHLORIDE, DEXTROSE MONOHYDRATE AND SODIUM CHLORIDE: 150; 5; 900 INJECTION, SOLUTION INTRAVENOUS at 08:18

## 2021-04-20 RX ADMIN — MULTIPLE VITAMINS W/ MINERALS TAB 1 TABLET: TAB at 09:03

## 2021-04-20 RX ADMIN — FOLIC ACID 1 MG: 1 TABLET ORAL at 09:04

## 2021-04-20 RX ADMIN — THIAMINE HCL TAB 100 MG 200 MG: 100 TAB at 16:08

## 2021-04-20 RX ADMIN — POTASSIUM & SODIUM PHOSPHATES POWDER PACK 280-160-250 MG 1 PACKET: 280-160-250 PACK at 16:09

## 2021-04-20 RX ADMIN — GABAPENTIN 900 MG: 300 CAPSULE ORAL at 09:03

## 2021-04-20 ASSESSMENT — ACTIVITIES OF DAILY LIVING (ADL)
ADLS_ACUITY_SCORE: 19
ADLS_ACUITY_SCORE: 21
ADLS_ACUITY_SCORE: 20
ADLS_ACUITY_SCORE: 19
ADLS_ACUITY_SCORE: 19
ADLS_ACUITY_SCORE: 21

## 2021-04-20 NOTE — CONSULTS
4/20/2021      Spoke with pt via telephone for CD Consult. Pt reports he has been to treatment before and does not see what it will do for him at this time. Pt informed he will need to follow up with his county for funding eligibility based on his household income if he would like treatment.    [facility]  Phone - [phone]  Fax - [phone]    Ena Monge [facility]  Phone: [phone]  Email: [email or web address]

## 2021-04-20 NOTE — PLAN OF CARE
Disoriented to time. VSS on RA. C/O back pain, PRN PO tylenol given per pt request w/ little relief, heat pad applied. CIWA 3,3. AM K+ 3.3, replaced per protocol w/ recheck 3.9. AM Phosph 2.3, replaced per protocol w/ recheck 4/21 AM. Tele SR/ST (HR up to 140's w/ ambulation), denied CP. LS clear, denied SOB. PIV to right intact, infusing w/ D5 NS + K @ 125 ml/hr. Up Ax1-2 GB W. Up to chair for meals. Sz precautions maintained. Will continue POC.

## 2021-04-20 NOTE — PLAN OF CARE
Orientation: A&Ox4, lethargic   VS: stable   Pain: denies  Tele: NSR  Activity: 2 assist w/ GB, generalized weakness  Resp: WDL  GI:  WDL  : WDL  Notable Labs: K 3.5, Phos 2.3   Lines: PIV S/L  Other: seizure precautions, CIWA 6 & 3, wounds on tongue  Plan: Continue to monitor w/ POC

## 2021-04-20 NOTE — PLAN OF CARE
"BP (!) 122/94 (BP Location: Right arm)   Pulse 111   Temp 98.4  F (36.9  C) (Oral)   Resp 16   Ht 1.676 m (5' 6\")   Wt 59 kg (130 lb)   SpO2 100%   BMI 20.98 kg/m       Regular diet. Denies pain. RA. A/Ox4. A-2 with GB. Uses urinal at bedside and commode. CIWA: 5,8. K replaced, redraw at 1030. No replacement needed. AM lab draw ordered.  Mag and phos replaced- recheck showed AM redraw- ordered. Seizure precautions maintained. PRN ativan given for CIWA. See MAR. Tele: SR.   "

## 2021-04-20 NOTE — PROGRESS NOTES
Care Management Follow Up    Length of Stay (days): 2    Expected Discharge Date: 04/22/21     Concerns to be Addressed: discharge planning, financial/insurance, substance/tobacco abuse/use     Patient plan of care discussed at interdisciplinary rounds: Yes    Anticipated Discharge Disposition:  Home    Anticipated Discharge Services: [facility]  Anticipated Discharge DME: N/A      Patient/family educated on Medicare website which has current facility and service quality ratings: no  Education Provided on the Discharge Plan: yes   Patient/Family in Agreement with the Plan:    yes  Referrals Placed by CM/SW:    Private pay costs discussed: Pt aware that  has requested FC to see him due to no ins    Additional Information:  Met with pt today. He is aware that SW spoke with his wife yesterday.. Pt did confirm he does not have Health ins. 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 He resigned. Started a job last October then quit in December.  Pt has been living of partial Pension plan.     Pt stated that he does drink but feels his wife exaggerates  what he drinks.     did ask pt to consider [employer] program.Information provided to pt and will need SW follow up prior to d.c to see if the program would interest him      Pt at this time is not interested in any formal CD program         Corinne C. White, LSW

## 2021-04-20 NOTE — PROGRESS NOTES
[facility]    Medicine Progress Note - Hospitalist Service       Date of Admission:  4/18/2021  Length of stay: 2 days    Assessment & Plan   El Freire is a 46-year-old male with history of alcohol abuse and alcohol withdrawal including alcohol withdrawal seizure in the past he is admitted to the hospitalist service with acute encephalopathy and possible seizure activity.    Today-overall patient appears to be improving.  Continuing gabapentin taper.  CIWA scores are minimal.  Discussion with wife that she would like us to file for commitment on him, not sure that we can do that.  Patient merits ongoing inpatient observation with multiple electrolyte imbalance concerning for refeeding syndrome and at risk for alcohol withdrawal still.    Alcohol abuse  Alcohol dependence  Alcohol withdrawal  Alcohol withdrawal seizure  - Per report, patient's wife left home for about 3 hours but when she returned home she found him on the floor, confused with blood coming from his mouth and injuries to his tongue.  This is most concerning for alcohol withdrawal seizure.  He reports he has been drinking heavily but is trying to cut down.  - Gabapentin on a scheduled taper as per CIWA protocol.  - Symptom triggered lorazepam.  - Chemical dependency consult-patient declined assistance  - Appreciate SW consult--will look into SUDS    Mild alcoholic hepatitis  Right upper quadrant abdominal pain  - AST 51, ALT 77 and bilirubin 3.1 on 4/19.  Patient complaining of some right upper quadrant discomfort.   - Check right upper quadrant ultrasound which shows no signs concerning for cholecystitis, does show hepatic steatosis.  - Monitor.    Hypokalemia, hypophosphatemia  - Replace per protocol    Anemia/thrombocytopenia  - Mild, hemoglobin 12.6 and platelets 113, likely due to toxic effect of alcohol on bone marrow.    Diet: Regular  DVT Prophylaxis: Low Risk/Ambulatory with no VTE prophylaxis indicated  Malnutrition:  "No  Palacios Catheter: No  Code Status: Full Code     Disposition Plan   Expected discharge:   Monitor at least overnight, keep in hospital longer if needed     Demarco Mcfadden MD  Hospitalist Service  [facility]  ______________________________________________________________________    Interval History   Patient is feeling well.  No further seizure activity.  Thinks he is getting better.    Data reviewed today: I reviewed all medications, new labs and imaging results over the last 24 hours.     Physical Exam   BP (!) 142/10 (BP Location: Right arm)   Pulse 72   Temp 98.9  F (37.2  C) (Oral)   Resp 16   Ht 1.676 m (5' 6\")   Wt 59 kg (130 lb)   SpO2 97%   BMI 20.98 kg/m    130 lbs 0 oz       General: Laying flat in the bed in no acute distress.    HEENT: No scleral icterus. Oropharynx moist.     Neck: Supple. Normal range of motion.     Pulmonary: Normal work of breathing. Clear to auscultation bilaterally.    Cardiovascular: Regular rate and rhythm without murmur or extra heart sounds.    Abdomen: Mild right upper quadrant tenderness to palpation.    Extremities: No peripheral edema. No clubbing or cyanosis.  Mild left rib tenderness to palpation.    Neurologic: Awake, alert, appropriate.    Skin: Warm and dry.    Psychiatric: Normal affect and mood.     Data    Recent Labs   Lab 04/20/21  1319 04/20/21  0620 04/19/21  2151 04/19/21  0624 04/19/21  0624 04/18/21 2033 04/18/21 2033   WBC  --  6.1  --   --  8.0  --  5.9   HGB  --  13.1*  --   --  12.6*  --  12.1*   MCV  --  107*  --   --  105*  --  107*   PLT  --  124*  --   --  113*  --  139*   INR  --   --   --   --   --   --  1.27*   NA  --  139  --   --  138  --  138   POTASSIUM 3.9 3.3* 3.5   < > 3.2*   < > 2.5*   CHLORIDE  --  107  --   --  102  --  99   CO2  --  27  --   --  28  --  18*   BUN  --  3*  --   --  1*  --  3*   CR  --  0.42*  --   --  0.39*  --  0.42*   ANIONGAP  --  5  --   --  8  --  21*   BELIA  --  8.3*  --   --  8.0*  " --  8.3*   GLC  --  83  --   --  120*  --  179*   ALBUMIN  --  2.8*  --   --  3.2*  --  3.5   PROTTOTAL  --  6.9  --   --  7.5  --  8.0   BILITOTAL  --  3.5*  --   --  3.1*  --  2.8*   ALKPHOS  --  125  --   --  127  --  139   ALT  --  71*  --   --  77*  --  70   AST  --  184*  --   --  251*  --  196*   LIPASE  --   --   --   --   --   --  164   TROPI  --   --   --   --   --   --  <0.015    < > = values in this interval not displayed.     No results found for this or any previous visit (from the past 24 hour(s)).    Medications      Current Facility-Administered Medications   Medication Dose Route Frequency     folic acid  1 mg Oral Daily     [START ON 4/26/2021] gabapentin  100 mg Oral Q8H     [START ON 4/24/2021] gabapentin  300 mg Oral Q8H     [START ON 4/22/2021] gabapentin  600 mg Oral Q8H     gabapentin  900 mg Oral Q8H     multivitamin w/minerals  1 tablet Oral Daily     potassium & sodium phosphates  1 packet Oral TID     sodium chloride (PF)  3 mL Intracatheter Q8H     thiamine  200 mg Oral TID    Followed by     [START ON 4/21/2021] thiamine  100 mg Oral TID    Followed by     [START ON 4/26/2021] thiamine  100 mg Oral Daily

## 2021-04-21 VITALS
SYSTOLIC BLOOD PRESSURE: 145 MMHG | DIASTOLIC BLOOD PRESSURE: 107 MMHG | TEMPERATURE: 98.7 F | RESPIRATION RATE: 16 BRPM | WEIGHT: 130 LBS | OXYGEN SATURATION: 95 % | HEIGHT: 66 IN | HEART RATE: 82 BPM | BODY MASS INDEX: 20.89 KG/M2

## 2021-04-21 LAB
MAGNESIUM SERPL-MCNC: 1.3 MG/DL (ref 1.6–2.3)
PHOSPHATE SERPL-MCNC: 2.8 MG/DL (ref 2.5–4.5)
POTASSIUM SERPL-SCNC: 3.9 MMOL/L (ref 3.4–5.3)

## 2021-04-21 PROCEDURE — 99238 HOSP IP/OBS DSCHRG MGMT 30/<: CPT | Performed by: INTERNAL MEDICINE

## 2021-04-21 PROCEDURE — 36415 COLL VENOUS BLD VENIPUNCTURE: CPT | Performed by: INTERNAL MEDICINE

## 2021-04-21 PROCEDURE — 250N000013 HC RX MED GY IP 250 OP 250 PS 637: Performed by: INTERNAL MEDICINE

## 2021-04-21 PROCEDURE — 84100 ASSAY OF PHOSPHORUS: CPT | Performed by: INTERNAL MEDICINE

## 2021-04-21 PROCEDURE — 84132 ASSAY OF SERUM POTASSIUM: CPT | Performed by: INTERNAL MEDICINE

## 2021-04-21 PROCEDURE — 83735 ASSAY OF MAGNESIUM: CPT | Performed by: INTERNAL MEDICINE

## 2021-04-21 PROCEDURE — HZ2ZZZZ DETOXIFICATION SERVICES FOR SUBSTANCE ABUSE TREATMENT: ICD-10-PCS | Performed by: INTERNAL MEDICINE

## 2021-04-21 PROCEDURE — 258N000001 HC RX 258: Performed by: INTERNAL MEDICINE

## 2021-04-21 RX ORDER — LISINOPRIL 10 MG/1
10 TABLET ORAL DAILY
Qty: 30 TABLET | Refills: 0 | Status: SHIPPED | OUTPATIENT
Start: 2021-04-22

## 2021-04-21 RX ORDER — MAGNESIUM OXIDE 400 MG/1
400 TABLET ORAL 2 TIMES DAILY
Status: DISCONTINUED | OUTPATIENT
Start: 2021-04-21 | End: 2021-04-21 | Stop reason: HOSPADM

## 2021-04-21 RX ORDER — FOLIC ACID 1 MG/1
1 TABLET ORAL DAILY
Qty: 30 TABLET | Refills: 0 | Status: SHIPPED | OUTPATIENT
Start: 2021-04-21

## 2021-04-21 RX ORDER — LISINOPRIL 10 MG/1
10 TABLET ORAL DAILY
Status: DISCONTINUED | OUTPATIENT
Start: 2021-04-21 | End: 2021-04-21 | Stop reason: HOSPADM

## 2021-04-21 RX ORDER — LANOLIN ALCOHOL/MO/W.PET/CERES
100 CREAM (GRAM) TOPICAL DAILY
Qty: 30 TABLET | Refills: 0 | Status: SHIPPED | OUTPATIENT
Start: 2021-04-26

## 2021-04-21 RX ADMIN — THIAMINE HCL TAB 100 MG 100 MG: 100 TAB at 16:10

## 2021-04-21 RX ADMIN — POTASSIUM CHLORIDE, DEXTROSE MONOHYDRATE AND SODIUM CHLORIDE: 150; 5; 900 INJECTION, SOLUTION INTRAVENOUS at 01:11

## 2021-04-21 RX ADMIN — LISINOPRIL 10 MG: 10 TABLET ORAL at 13:22

## 2021-04-21 RX ADMIN — MULTIPLE VITAMINS W/ MINERALS TAB 1 TABLET: TAB at 09:22

## 2021-04-21 RX ADMIN — MAGNESIUM OXIDE TAB 400 MG (241.3 MG ELEMENTAL MG) 400 MG: 400 (241.3 MG) TAB at 09:22

## 2021-04-21 RX ADMIN — FOLIC ACID 1 MG: 1 TABLET ORAL at 09:22

## 2021-04-21 RX ADMIN — GABAPENTIN 900 MG: 300 CAPSULE ORAL at 09:22

## 2021-04-21 RX ADMIN — THIAMINE HCL TAB 100 MG 100 MG: 100 TAB at 09:22

## 2021-04-21 RX ADMIN — GABAPENTIN 900 MG: 300 CAPSULE ORAL at 16:10

## 2021-04-21 ASSESSMENT — ACTIVITIES OF DAILY LIVING (ADL)
ADLS_ACUITY_SCORE: 20
ADLS_ACUITY_SCORE: 19
ADLS_ACUITY_SCORE: 18
ADLS_ACUITY_SCORE: 18
ADLS_ACUITY_SCORE: 19

## 2021-04-21 NOTE — PLAN OF CARE
"A/O x 4. VSS on RA except elevated BP's , resumed PTA lisinopril this shift . C/O back and midsternal/epigastric pain, denied chest pressure/sharpness, pt characterized pain as muscle-related \"probably from laying in bed;\" symptoms improved w/ up in chair and heat application. AM Mg 1.3, replaced per protocol w/ recheck scheduled 4/22 AM. CIWA 1,1. Tele SR. LS clear/dim, denied SOB. PIV to right intact, SL . Up Ax2 GB W. Sz precautions maintained. Discharge plan pending. Will continue POC.   "

## 2021-04-21 NOTE — PROVIDER NOTIFICATION
"MD paged: \"Pt's BP still elevated, 144/107. Pt says he takes lisinopril and metoprolol at home, I don't see in MAR, wanted to let you know. Thanks!\"  "

## 2021-04-21 NOTE — PLAN OF CARE
CIWA score 5 and 4, no med given. Continue scheduled Gabapentin, Vitamin and IV fluid. Gait improving, up with assist of one to BR, up in chair, call appropriately. Continue to monitor.

## 2021-04-21 NOTE — PROGRESS NOTES
Care Management Discharge Note    Discharge Date: 04/21/21       Discharge Disposition: Home    Discharge Services: [facility]    Discharge DME: Walker    Discharge Transportation: family or friend will provide    Private pay costs discussed: Not applicable    PAS Confirmation Code:  N/A  Patient/family educated on Medicare website which has current facility and service quality ratings: (N/A)    Education Provided on the Discharge Plan: yes   Persons Notified of Discharge Plans: pt.physician, and [employer]  Patient/Family in Agreement with the Plan: yes    Handoff Referral Completed: No    Additional Information:  The pt will discharge home with his spouse today.  Sw met with the pt and he agreed to having the [employer] SU[employer] referral.  The pt filled out the application and sw faxed it to SUDS P: [phone] F: [phone].  Frances provided the pt with a copy of his completed application.  The pt had no additional questions or concerns for sw at this time.    Sw will continue to be available as needed until discharge.      YOEL Suarez, SW  Inpatient Care Coordination  [facility]  [phone]

## 2021-04-21 NOTE — PROVIDER NOTIFICATION
"MD paged: \"Pt /118, higher than this AM. Pt c/o back/chest pain; midsternal, denies pressure/sharpness, thinks muscle discomfort from laying in bed. No PRN for high BP. Please advise as needed, thx\"  "

## 2021-04-21 NOTE — PLAN OF CARE
Vitals VSS except BP elevated (152/113), paged MD  Neuro Q4hr neuro checks- neurso intact. CIWA scores 3 and 2  Respiratory 100% RA  Cardiac/Tele SR  GI/ Urinal at beside  Skin Intact  LDAs D5 NS w/K+ infusing at 125 mL/hr  Labs Phos 2.3, Mag 1.6, K+ 3.9  Diet Regular  Activity A2 gait belt and walker   Plan Continue to monitor

## 2021-04-22 NOTE — DISCHARGE SUMMARY
"[facility]  Hospitalist Discharge Summary       Date of Admission:  4/18/2021  Date of Discharge:  4/21/2021  5:01 PM  Discharging Provider: Demarco Mcfadden MD      Discharge Diagnoses     Alcohol withdrawal seizure  Alcohol dependence  Alcohol abuse  Hypophosphatemia  Hypokalemia  Hypomagnesemia  Elevated transaminases  Alcoholic hepatitis    Follow-ups Needed After Discharge   Follow-up Appointments     Follow-up and recommended labs and tests       Follow up with outpatient chemical dependency program. Make an   appointment with your PCP in the next 1-2 weeks.             Discharge Disposition   Discharged to home  Condition at discharge: Stable    History of Present Illness   Per admission H&P:  \"El Freire is a 46 year old male reportedly with history of alcohol abuse and alcohol withdrawals, who was brought to the emergency room due to alteration in mental state and possible seizure-like activity that occurred at home but was unwitnessed.  As per patient's wife account patient has been drinking on a regular basis and she left him at home for approximately 3 hours but upon coming back she found our patient on the floor, confused and with the blood coming from his mouth and injuries to his tongue area.  It was also mentioned that he had a prior alcohol withdrawals seizure-like activity in the past.  EMS personnel was activated and upon initial presentation patient appears to be tremulous and no apparent seizure-like activities seen, unsteady on his feet and was provided with 1 mg of Ativan in route to the emergency room.    There was also some account that he has been having generalized weakness, episodes of vomiting yesterday but none today.  There was no report of fevers, chills, diarrhea, abdominal pain, chest pain or back pain.  All of these system review are are obtained through collateral sources as patient remained to be a poor historian .  Upon presentation in the " "emergency room he was found to have elevated blood pressure levels, not tachycardic, afebrile and not hypoxic.   Further investigation revealed multiple electrolyte derangements, acidosis, transaminitis and with accompanying mild anemia, thrombocytopenia, high MCV .  CT of the head showed no acute pathology but did reveal advanced atrophy for his age, chest x-ray showed no effusion, infiltrates or acute pathology.  He is a case was referred to us for further evaluation and care hence this hospitalization  During the time of my exam I found El awake, alert, somewhat cooperative but remained to be a poor historian.  He endorses no other new complaints.\"    Hospital Course     Patient was admitted for management of alcohol dependence, withdrawal and withdrawal seizure.  He had no further evidence of seizure activity in the hospital.  He was placed on scheduled gabapentin taper, as needed lorazepam based on CIWA scale, and supplemental thiamine/folate.  He had very mild alcohol withdrawal in the hospital with minimal CIWA scores.  He was seen by social work and chemical dependency.  His wife was interested in having him committed, but we did not have sufficient evidence to do this at this time.  He was offered outpatient treatment resources and he will have to follow-up on his own.    His transaminases and bilirubin were mildly elevated, bili was in the 3 range and AST//70 approximately.  We checked a right upper quadrant ultrasound as he was also complaining of right upper quadrant abdominal discomfort.  This showed hepatic steatosis and gallbladder sludge without any evidence of acute cholecystitis and no cholelithiasis.  He was able to tolerate regular diet without issues.    Lastly, his electrolytes were replaced, good potassium, magnesium and phosphorus, suggestive of refeeding type picture.    Consultations This Hospital Stay   CARE MANAGEMENT / SOCIAL WORK IP CONSULT  NEUROLOGY IP CONSULT  CHEMICAL " DEPENDENCY IP CONSULT  SOCIAL WORK IP CONSULT      Code Status   Prior    Time Spent on this Encounter   I, Demarco Mcfadden MD, personally saw the patient today and spent less than or equal to 30 minutes discharging this patient.       Demarco Mcfadden MD  [facility]  ______________________________________________________________________    Physical Exam   Vital Signs:                   Weight: 130 lbs 0 oz      General: No distress.    HEENT: No scleral icterus. Oropharynx moist.     Neck: Supple. Normal range of motion.    Pulmonary: Normal work of breathing. Clear to auscultation bilaterally.    Cardiovascular: Regular rate and rhythm without murmur or extra heart sounds.    Abdomen: Soft and non-tender.    Extremities: No peripheral edema. No clubbing.    Neurologic: Awake, alert, appropriate.    Skin: Warm and dry.    Psychiatric: Normal affect and mood.       Primary Care Physician   Physician No Ref-Primary    Discharge Orders      Reason for your hospital stay    Alcohol withdrawal seizure     Follow-up and recommended labs and tests     Follow up with outpatient chemical dependency program. Make an appointment with your PCP in the next 1-2 weeks.     Activity    Your activity upon discharge: As tolerated     Diet    Follow this diet upon discharge: Regular       Significant Results and Procedures   Most Recent 3 CBC's:  Recent Labs   Lab Test 04/20/21  0620 04/19/21  0624 04/18/21 2033   WBC 6.1 8.0 5.9   HGB 13.1* 12.6* 12.1*   * 105* 107*   * 113* 139*     Most Recent 3 BMP's:  Recent Labs   Lab Test 04/21/21  0621 04/20/21  1319 04/20/21  0620 04/19/21  0624 04/19/21  0624 04/18/21 2033 04/18/21 2033   NA  --   --  139  --  138  --  138   POTASSIUM 3.9 3.9 3.3*   < > 3.2*   < > 2.5*   CHLORIDE  --   --  107  --  102  --  99   CO2  --   --  27  --  28  --  18*   BUN  --   --  3*  --  1*  --  3*   CR  --   --  0.42*  --  0.39*  --  0.42*   ANIONGAP  --    --  5  --  8  --  21*   BELIA  --   --  8.3*  --  8.0*  --  8.3*   GLC  --   --  83  --  120*  --  179*    < > = values in this interval not displayed.     Most Recent 2 LFT's:  Recent Labs   Lab Test 04/20/21  0620 04/19/21  0624   * 251*   ALT 71* 77*   ALKPHOS 125 127   BILITOTAL 3.5* 3.1*     Most Recent 3 INR's:  Recent Labs   Lab Test 04/18/21 2033 01/02/20  1935 05/23/16  1923   INR 1.27* 1.03 0.98   ,   Results for orders placed or performed during the hospital encounter of 04/18/21   XR Chest Port 1 View    Narrative    EXAM: XR CHEST PORT 1 VW  LOCATION: [facility]  DATE/TIME: 4/18/2021 9:08 PM    INDICATION: Cough.  COMPARISON: None.      Impression    IMPRESSION: Heart size upper limits of normal. Mildly tortuous thoracic aorta. Lungs are clear.   Head CT w/o contrast    Narrative    EXAM: CT HEAD W/O CONTRAST  LOCATION: [facility]  DATE/TIME: 4/18/2021 10:28 PM    INDICATION: Head trauma, abnormal mental status (Age 19-64y)  COMPARISON: None.  TECHNIQUE: Routine CT Head without IV contrast. Multiplanar reformats. Dose reduction techniques were used.    FINDINGS:  INTRACRANIAL CONTENTS: No intracranial hemorrhage, extraaxial collection, or mass effect.  No CT evidence of acute infarct. Normal parenchymal attenuation. Moderate generalized volume loss. No hydrocephalus.     VISUALIZED ORBITS/SINUSES/MASTOIDS: No intraorbital abnormality. No paranasal sinus mucosal disease. No middle ear or mastoid effusion.    BONES/SOFT TISSUES: No acute abnormality.      Impression    IMPRESSION:  1.  No acute intracranial process.  2.  Advanced atrophy for age.   US Abdomen Limited    Narrative    ULTRASOUND ABDOMEN LIMITED   4/19/2021 11:31 AM     HISTORY:  Right upper quadrant abdominal pain, alcohol use.    COMPARISON: None available.    FINDINGS:    Gallbladder: Gallbladder sludge. No cholelithiasis, gallbladder wall  thickening or pericholecystic fluid. Sonographic Adams's sign  is  negative.    Bile ducts:  CHD is normal diameter.  No intrahepatic biliary  dilatation. The distal aspect of the common bile duct is obscured by  overlying bowel gas.    Liver: It demonstrates increased parenchymal echogenicity. No focal  hepatic mass is visualized.    Pancreas:  Totally obscured by overlying bowel gas.    Right kidney:  No hydronephrosis or shadowing calculi.    Aorta and IVC:  Not specifically assessed.       Impression    IMPRESSION:    1. Hepatic steatosis. No focal hepatic mass visualized.  2. Gallbladder sludge. No cholelithiasis or sonographic evidence of  acute cholecystitis.    JAYDA MORGAN MD       Discharge Medications   Discharge Medication List as of 4/21/2021  3:59 PM      START taking these medications    Details   thiamine (B-1) 100 MG tablet Take 1 tablet (100 mg) by mouth daily, Disp-30 tablet, R-0, E-Prescribe         CONTINUE these medications which have CHANGED    Details   folic acid (FOLVITE) 1 MG tablet Take 1 tablet (1 mg) by mouth daily, Disp-30 tablet, R-0, E-Prescribe      lisinopril (ZESTRIL) 10 MG tablet Take 1 tablet (10 mg) by mouth daily, Disp-30 tablet, R-0, E-Prescribe         STOP taking these medications       Acetaminophen (TYLENOL PO) Comments:   Reason for Stopping:         ibuprofen (ADVIL/MOTRIN) 200 MG tablet Comments:   Reason for Stopping:         metoprolol succinate ER (TOPROL-XL) 50 MG 24 hr tablet Comments:   Reason for Stopping:         potassium chloride ER (KLOR-CON M) 20 MEQ CR tablet Comments:   Reason for Stopping:         traZODone (DESYREL) 50 MG tablet Comments:   Reason for Stopping:             Allergies   Allergies   Allergen Reactions     Hydrocodone

## 2021-09-19 ENCOUNTER — HEALTH MAINTENANCE LETTER (OUTPATIENT)
Age: 46
End: 2021-09-19

## 2022-01-09 ENCOUNTER — HEALTH MAINTENANCE LETTER (OUTPATIENT)
Age: 47
End: 2022-01-09

## 2022-11-20 ENCOUNTER — HEALTH MAINTENANCE LETTER (OUTPATIENT)
Age: 47
End: 2022-11-20

## 2023-04-15 ENCOUNTER — HEALTH MAINTENANCE LETTER (OUTPATIENT)
Age: 48
End: 2023-04-15

## 2023-08-20 NOTE — DISCHARGE SUMMARY
"[facility] Observation Unit Discharge Summary          Chalo Freire MRN# [id number]   Age: 45 year old YOB: 1975     Date of Admission:  5/23/2020  Date of Discharge::  5/24/2020  Admitting Physician:  Malik Morataya MD  Discharge Physician:  Lavern Martinez PA-C  Primary Physician: Allen Flanagan     Primary Discharge Diagnoses:   Hypokalemia  Left 10-12th rib fracture  Pancreatic Abnormality     Secondary Discharge Diagnoses:   HTN  Hx Alcohol Abuse     Hospital Course:   For detail history, please refer to H & P from 5/23/2020. In brief, this is a 45 year old male \"with a past medical history of alcohol use with admission in January 2020 for alcohol withdrawal, rib contusion reported in December 2020 who presented to the urgent care facility today for left-sided rib pain after hitting his side on the kitchen counter.     Patient states that about a week ago, he hit his left side on the kitchen counter.  At the time he was reaching up to try to grab something from the cabinet, he fell onto his left side.  He had immediate pain to the area.  He states he has been having pain since then that feels like the pain he was having with his rib contusions in December.  States the pain right now is about a 2 out of 10.  Pain is worse when he presses down on the area.  He has been using acetaminophen for pain control.  Has not used any narcotics.  He denies any shortness of breath associated with his symptoms.  No cough.  No fevers or chills, abdominal pain, nausea or vomiting, diarrhea, changes in urination.       On review of records, it does seem that he was seen in the emergency room in December 2019 and diagnosed with a rib contusion.  X-rays at that time did not show evidence of fracture.     Patient was seen at outside urgent care where patient had significant hypokalemia to 2.1.  BMP was otherwise unremarkable.  CBC was also unremarkable.  He had a CT abdomen pelvis that showed subacute " "fractures of the left 10th through 12th ribs.  Also showed an area of decreased density or enhancement in the posterior head of the pancreas.  A call was placed to [facility] for transfer given his hypokalemia.\"    Patient was admitted to the observation unit.  He remained hemodynamically stable and afebrile on room air. His potassium was replaced and normalized to 3.7.  He reports a history of hypokalemia with poor oral intake recently.  He was resumed on his pta potassium supplements and instructed to follow up with PCP within one week for repeat bmp.  Trauma surgery was consulted given rib fractures and recommended no further intervention for his rib fractures.  Pain was controlled Tylenol, Ibuprofen and Lidocaine patch and he was discharged home with Incentive spirometer.  CT abd/pelvis revealed an area of decreased density or enhancement in the posterior head of the pancreas on CT, which has not been seen on prior imaging.  GI was consulted to further assess and felt this may represent sequela of prior pancreatitis.  GI recommends EUS in 4-6 weeks.    Procedures/Imaging:     Results for orders placed or performed during the hospital encounter of 03/23/20   XR Chest Port 1 View    Narrative    XR CHEST PORT 1 VW 3/23/2020 9:53 AM    HISTORY: chest pain    COMPARISON: Chest x-ray 1/2/2020      Impression    IMPRESSION: The cardiac silhouette and pulmonary vasculature are  within normal limits. No focal pulmonary consolidations. No pleural  effusion or pneumothorax.    EDISON FAJARDO MD     Consultations:   Trauma Surgery  Gastroenterology    Code Status:   Full    Allergies:     Allergies   Allergen Reactions     Hydrocodone         Subjective:   Patient reports an episode of abdominal pain last night which has resolved and improved with Tylenol.  He had a loose stool this morning, but states this is not uncommon for him.  He has a hx of intermittent diarrhea.  He reports left sided chest pain is " controlled.  Denies shortness of breath.  Reports poor appetite recently.    Physical Exam:   Blood pressure (!) 142/97, pulse 57, temperature 97.1  F (36.2  C), temperature source Oral, resp. rate 16, weight 61.8 kg (136 lb 4.8 oz), SpO2 100 %. on room air  General: Alert, interactive, NAD, lying in bed, pleasant and cooperative.  HEENT: AT/NC, sclera anicteric, PERRL, EOMI  Resp: clear to auscultation bilaterally, no crackles or wheezes  Cardiac: regular rate and rhythm, no murmur.  Left lateral\posterior chest tender to palpation.  No overlying swelling or ecchymosis.  Abdomen: Soft, nontender, nondistended. +BS.  No rebound or guarding.  Extremities: No LE edema  Skin: Warm and dry, no jaundice or rash  Neuro: Alert & oriented x 3, no focal deficits, moves all extremities equally     Discharge Medicatios:        Current Discharge Medication List      START taking these medications    Details   Lidocaine (LIDOCARE) 4 % Patch Place 1 patch onto the skin every 24 hours To prevent lidocaine toxicity, patient should be patch free for 12 hrs daily.Apply patch(s) to affected area. To prevent lidocaine toxicity, patient should be patch free for 12 hrs daily. Patches may be cut to smaller size prior to removing release liner. Reminder: Remove previous patch before applying new patch.  NEVER APPLY HEAT OVER PATCH which increases absorption and may lead to local anesthetic toxicity. Do not apply over area where liposomal bupivacaine was injected for 96 hours post injection.  Qty: 10 patch, Refills: 0    Associated Diagnoses: Closed fracture of multiple ribs of left side, initial encounter         CONTINUE these medications which have CHANGED    Details   potassium chloride (KLOR-CON) 20 MEQ packet Take 20 mEq by mouth 2 times daily  Qty: 60 packet, Refills: 0         CONTINUE these medications which have NOT CHANGED    Details   acetaminophen (TYLENOL) 325 MG tablet Take 2 tablets (650 mg) by mouth every 4 hours as needed  for mild pain  Qty: 60 tablet, Refills: 0    Associated Diagnoses: Closed fracture of one rib with routine healing, unspecified laterality, subsequent encounter      folic acid (FOLVITE) 1 MG tablet Take 1 mg by mouth daily      ibuprofen (ADVIL/MOTRIN) 200 MG tablet Take 600 mg by mouth every 8 hours as needed for mild pain      lisinopril (ZESTRIL) 20 MG tablet Take 1 tablet (20 mg) by mouth daily  Qty: 30 tablet, Refills: 0    Associated Diagnoses: Alcohol withdrawal, uncomplicated (H)      metoprolol succinate ER (TOPROL-XL) 50 MG 24 hr tablet Take 1 tablet (50 mg) by mouth every evening  Qty: 30 tablet, Refills: 0    Associated Diagnoses: Benign essential hypertension      traZODone (DESYREL) 50 MG tablet Take 1 tablet (50 mg) by mouth At Bedtime  Qty: 30 tablet, Refills: 0    Associated Diagnoses: Current mild episode of major depressive disorder, unspecified whether recurrent (H)             Instructions Given to Patient as Discharge:     Discharge Procedure Orders   Reason for your hospital stay   Order Comments: You were hospitalized due to low potassium.     Follow-up and recommended labs and tests    Order Comments: Please follow up with PCP within one week for repeat potassium level.  Please follow up with GI in 4-6 weeks for an Endoscopic Ultrasound to further evaluate your pancreas.     Activity   Order Comments: Your activity upon discharge: activity as tolerated     Order Specific Question Answer Comments   Is discharge order? Yes      When to contact your care team   Order Comments: Notify for fever >101.5; black or bloody stools; severe, persistent, or worsening nausea, vomiting, abdominal pain or distention, diarrhea, constipation; chest pain, difficulty breathing, swelling; dizziness, weakness, lightheadedness, fainting.  Proceed to the nearest emergency room for any urgent concerns.     Diet   Order Comments: Follow this diet upon discharge: Orders Placed This Encounter      Regular Diet Adult      Order Specific Question Answer Comments   Is discharge order? Yes        Pending Tests at Discharge:   none    Discharge Disposition:   Discharged to home     Lavern Martinez MS, PA-C  Hospitalist Service  Pager [phone]    >30 minutes was spent in discharge planning, care coordination, physical examination and medication reconciliation on the date of discharge, 5/24/2020     Admitted